# Patient Record
Sex: MALE | Race: BLACK OR AFRICAN AMERICAN | NOT HISPANIC OR LATINO | Employment: OTHER | URBAN - METROPOLITAN AREA
[De-identification: names, ages, dates, MRNs, and addresses within clinical notes are randomized per-mention and may not be internally consistent; named-entity substitution may affect disease eponyms.]

---

## 2023-01-01 ENCOUNTER — APPOINTMENT (OUTPATIENT)
Dept: RADIOLOGY | Facility: HOSPITAL | Age: 63
DRG: 286 | End: 2023-01-01
Payer: MEDICARE

## 2023-01-01 ENCOUNTER — APPOINTMENT (OUTPATIENT)
Dept: PRIMARY CARE | Facility: CLINIC | Age: 63
End: 2023-01-01
Payer: MEDICARE

## 2023-01-01 ENCOUNTER — PATIENT OUTREACH (OUTPATIENT)
Dept: CARE COORDINATION | Facility: CLINIC | Age: 63
End: 2023-01-01

## 2023-01-01 ENCOUNTER — HOSPITAL ENCOUNTER (INPATIENT)
Facility: HOSPITAL | Age: 63
LOS: 18 days | DRG: 286 | End: 2023-10-22
Attending: EMERGENCY MEDICINE | Admitting: INTERNAL MEDICINE
Payer: MEDICARE

## 2023-01-01 ENCOUNTER — TELEPHONE (OUTPATIENT)
Dept: PRIMARY CARE | Facility: CLINIC | Age: 63
End: 2023-01-01

## 2023-01-01 ENCOUNTER — OFFICE VISIT (OUTPATIENT)
Dept: PRIMARY CARE | Facility: CLINIC | Age: 63
End: 2023-01-01
Payer: MEDICARE

## 2023-01-01 ENCOUNTER — DOCUMENTATION (OUTPATIENT)
Dept: CARE COORDINATION | Facility: CLINIC | Age: 63
End: 2023-01-01
Payer: MEDICARE

## 2023-01-01 ENCOUNTER — APPOINTMENT (OUTPATIENT)
Dept: CARDIOLOGY | Facility: HOSPITAL | Age: 63
DRG: 286 | End: 2023-01-01
Payer: MEDICARE

## 2023-01-01 VITALS
RESPIRATION RATE: 20 BRPM | WEIGHT: 315 LBS | HEIGHT: 72 IN | TEMPERATURE: 96.8 F | BODY MASS INDEX: 42.66 KG/M2 | DIASTOLIC BLOOD PRESSURE: 61 MMHG | OXYGEN SATURATION: 95 % | SYSTOLIC BLOOD PRESSURE: 99 MMHG | HEART RATE: 125 BPM

## 2023-01-01 VITALS
DIASTOLIC BLOOD PRESSURE: 69 MMHG | OXYGEN SATURATION: 96 % | WEIGHT: 315 LBS | BODY MASS INDEX: 50.98 KG/M2 | SYSTOLIC BLOOD PRESSURE: 109 MMHG | TEMPERATURE: 96.8 F | HEART RATE: 69 BPM

## 2023-01-01 DIAGNOSIS — K13.70 MOUTH LESION: Primary | ICD-10-CM

## 2023-01-01 DIAGNOSIS — I11.0 HYPERTENSIVE HEART DISEASE WITH HEART FAILURE (MULTI): ICD-10-CM

## 2023-01-01 DIAGNOSIS — I50.31 ACUTE DIASTOLIC HEART FAILURE WITH PRESERVED EJECTION FRACTION (MULTI): Primary | ICD-10-CM

## 2023-01-01 DIAGNOSIS — N17.9 AKI (ACUTE KIDNEY INJURY) (CMS-HCC): Primary | ICD-10-CM

## 2023-01-01 DIAGNOSIS — N17.1: ICD-10-CM

## 2023-01-01 DIAGNOSIS — I10 HYPERTENSION, UNSPECIFIED TYPE: ICD-10-CM

## 2023-01-01 DIAGNOSIS — G89.29 CHRONIC HEEL PAIN, LEFT: ICD-10-CM

## 2023-01-01 DIAGNOSIS — R06.09 OTHER FORMS OF DYSPNEA: ICD-10-CM

## 2023-01-01 DIAGNOSIS — M79.672 CHRONIC HEEL PAIN, LEFT: ICD-10-CM

## 2023-01-01 DIAGNOSIS — I50.33 ACUTE ON CHRONIC DIASTOLIC (CONGESTIVE) HEART FAILURE (MULTI): ICD-10-CM

## 2023-01-01 DIAGNOSIS — I10 ESSENTIAL HYPERTENSION: ICD-10-CM

## 2023-01-01 DIAGNOSIS — E87.70 HYPERVOLEMIA, UNSPECIFIED HYPERVOLEMIA TYPE: ICD-10-CM

## 2023-01-01 DIAGNOSIS — I48.91 ATRIAL FIBRILLATION, UNSPECIFIED TYPE (MULTI): ICD-10-CM

## 2023-01-01 DIAGNOSIS — I50.9 ACUTE ON CHRONIC CONGESTIVE HEART FAILURE, UNSPECIFIED HEART FAILURE TYPE (MULTI): ICD-10-CM

## 2023-01-01 DIAGNOSIS — I50.31 ACUTE DIASTOLIC HEART FAILURE WITH PRESERVED EJECTION FRACTION (MULTI): ICD-10-CM

## 2023-01-01 LAB
ABO GROUP (TYPE) IN BLOOD: NORMAL
ALANINE AMINOTRANSFERASE (SGPT) (U/L) IN SER/PLAS: 10 U/L (ref 10–52)
ALBUMIN (G/DL) IN SER/PLAS: 3.5 G/DL (ref 3.4–5)
ALBUMIN (MG/L) IN URINE: 971.6 MG/L
ALBUMIN MFR UR ELPH: 57 %
ALBUMIN SERPL BCP-MCNC: 2.5 G/DL (ref 3.4–5)
ALBUMIN SERPL BCP-MCNC: 2.6 G/DL (ref 3.4–5)
ALBUMIN SERPL BCP-MCNC: 2.7 G/DL (ref 3.4–5)
ALBUMIN SERPL BCP-MCNC: 2.8 G/DL (ref 3.4–5)
ALBUMIN SERPL BCP-MCNC: 2.9 G/DL (ref 3.4–5)
ALBUMIN SERPL BCP-MCNC: 3.2 G/DL (ref 3.4–5)
ALBUMIN/CREATININE (UG/MG) IN URINE: 528 UG/MG CRT (ref 0–30)
ALBUMIN: 2.2 G/DL (ref 3.4–5)
ALKALINE PHOSPHATASE (U/L) IN SER/PLAS: 88 U/L (ref 33–136)
ALP SERPL-CCNC: 81 U/L (ref 33–136)
ALP SERPL-CCNC: 85 U/L (ref 33–136)
ALPHA 1 GLOBULIN: 0.6 G/DL (ref 0.2–0.6)
ALPHA 2 GLOBULIN: 1.2 G/DL (ref 0.4–1.1)
ALPHA1 GLOB MFR UR ELPH: 3.9 %
ALPHA2 GLOB MFR UR ELPH: 9 %
ALT SERPL W P-5'-P-CCNC: 15 U/L (ref 10–52)
ALT SERPL W P-5'-P-CCNC: 26 U/L (ref 10–52)
ANCA AB PATTERN SER IF-IMP: NORMAL
ANCA IGG TITR SER IF: NORMAL {TITER}
ANION GAP BLDV CALCULATED.4IONS-SCNC: 15 MMOL/L (ref 10–25)
ANION GAP BLDV CALCULATED.4IONS-SCNC: 16 MMOL/L (ref 10–25)
ANION GAP IN SER/PLAS: 15 MMOL/L (ref 10–20)
ANION GAP SERPL CALC-SCNC: 16 MMOL/L (ref 10–20)
ANION GAP SERPL CALC-SCNC: 17 MMOL/L (ref 10–20)
ANION GAP SERPL CALC-SCNC: 17 MMOL/L (ref 10–20)
ANION GAP SERPL CALC-SCNC: 18 MMOL/L (ref 10–20)
ANION GAP SERPL CALC-SCNC: 19 MMOL/L (ref 10–20)
ANION GAP SERPL CALC-SCNC: 19 MMOL/L (ref 10–20)
ANION GAP SERPL CALC-SCNC: 20 MMOL/L (ref 10–20)
ANION GAP SERPL CALC-SCNC: 21 MMOL/L (ref 10–20)
ANION GAP SERPL CALC-SCNC: 21 MMOL/L (ref 10–20)
ANION GAP SERPL CALC-SCNC: 22 MMOL/L (ref 10–20)
ANION GAP SERPL CALC-SCNC: 23 MMOL/L (ref 10–20)
ANION GAP SERPL CALC-SCNC: 24 MMOL/L (ref 10–20)
ANION GAP SERPL CALC-SCNC: 25 MMOL/L (ref 10–20)
ANION GAP SERPL CALC-SCNC: 25 MMOL/L (ref 10–20)
ANION GAP SERPL CALC-SCNC: 27 MMOL/L (ref 10–20)
ANION GAP SERPL CALC-SCNC: 28 MMOL/L (ref 10–20)
ANTIBODY SCREEN: NORMAL
APPEARANCE UR: ABNORMAL
APPEARANCE UR: ABNORMAL
APPEARANCE UR: CLEAR
APTT PPP: 42 SECONDS (ref 27–38)
ASPARTATE AMINOTRANSFERASE (SGOT) (U/L) IN SER/PLAS: 20 U/L (ref 9–39)
AST SERPL W P-5'-P-CCNC: 43 U/L (ref 9–39)
AST SERPL W P-5'-P-CCNC: 85 U/L (ref 9–39)
B-GLOBULIN MFR UR ELPH: 12.1 %
BACTERIA #/AREA URNS AUTO: ABNORMAL /HPF
BASE EXCESS BLDV CALC-SCNC: -2.2 MMOL/L (ref -2–3)
BASE EXCESS BLDV CALC-SCNC: -2.4 MMOL/L (ref -2–3)
BASE EXCESS BLDV CALC-SCNC: 3.2 MMOL/L (ref -2–3)
BASOPHILS # BLD AUTO: 0.04 X10*3/UL (ref 0–0.1)
BASOPHILS # BLD AUTO: 0.05 X10*3/UL (ref 0–0.1)
BASOPHILS # BLD AUTO: 0.06 X10*3/UL (ref 0–0.1)
BASOPHILS # BLD AUTO: 0.07 X10*3/UL (ref 0–0.1)
BASOPHILS # BLD AUTO: 0.08 X10*3/UL (ref 0–0.1)
BASOPHILS (10*3/UL) IN BLOOD BY AUTOMATED COUNT: 0.04 X10E9/L (ref 0–0.1)
BASOPHILS NFR BLD AUTO: 0.4 %
BASOPHILS NFR BLD AUTO: 0.5 %
BASOPHILS NFR BLD AUTO: 0.6 %
BASOPHILS NFR BLD AUTO: 0.7 %
BASOPHILS NFR BLD AUTO: 0.7 %
BASOPHILS/100 LEUKOCYTES IN BLOOD BY AUTOMATED COUNT: 0.5 % (ref 0–2)
BETA GLOBULIN: 0.7 G/DL (ref 0.5–1.2)
BILIRUB SERPL-MCNC: 0.6 MG/DL (ref 0–1.2)
BILIRUB SERPL-MCNC: 0.7 MG/DL (ref 0–1.2)
BILIRUB UR STRIP.AUTO-MCNC: NEGATIVE MG/DL
BILIRUBIN TOTAL (MG/DL) IN SER/PLAS: 0.8 MG/DL (ref 0–1.2)
BNP SERPL-MCNC: 236 PG/ML (ref 0–99)
BODY TEMPERATURE: 37 DEGREES CELSIUS
BUN SERPL-MCNC: 101 MG/DL (ref 6–23)
BUN SERPL-MCNC: 50 MG/DL (ref 6–23)
BUN SERPL-MCNC: 54 MG/DL (ref 6–23)
BUN SERPL-MCNC: 54 MG/DL (ref 6–23)
BUN SERPL-MCNC: 56 MG/DL (ref 6–23)
BUN SERPL-MCNC: 57 MG/DL (ref 6–23)
BUN SERPL-MCNC: 59 MG/DL (ref 6–23)
BUN SERPL-MCNC: 60 MG/DL (ref 6–23)
BUN SERPL-MCNC: 61 MG/DL (ref 6–23)
BUN SERPL-MCNC: 62 MG/DL (ref 6–23)
BUN SERPL-MCNC: 63 MG/DL (ref 6–23)
BUN SERPL-MCNC: 63 MG/DL (ref 6–23)
BUN SERPL-MCNC: 66 MG/DL (ref 6–23)
BUN SERPL-MCNC: 66 MG/DL (ref 6–23)
BUN SERPL-MCNC: 70 MG/DL (ref 6–23)
BUN SERPL-MCNC: 74 MG/DL (ref 6–23)
BUN SERPL-MCNC: 76 MG/DL (ref 6–23)
BUN SERPL-MCNC: 79 MG/DL (ref 6–23)
BUN SERPL-MCNC: 88 MG/DL (ref 6–23)
BUN SERPL-MCNC: 93 MG/DL (ref 6–23)
BUN SERPL-MCNC: 96 MG/DL (ref 6–23)
C3 SERPL-MCNC: 152 MG/DL (ref 87–200)
C4 SERPL-MCNC: 43 MG/DL (ref 10–50)
CA-I BLDV-SCNC: 1.11 MMOL/L (ref 1.1–1.33)
CA-I BLDV-SCNC: 1.14 MMOL/L (ref 1.1–1.33)
CALCIUM (MG/DL) IN SER/PLAS: 8.6 MG/DL (ref 8.6–10.6)
CALCIUM SERPL-MCNC: 8.3 MG/DL (ref 8.6–10.6)
CALCIUM SERPL-MCNC: 8.4 MG/DL (ref 8.6–10.6)
CALCIUM SERPL-MCNC: 8.5 MG/DL (ref 8.6–10.6)
CALCIUM SERPL-MCNC: 8.6 MG/DL (ref 8.6–10.6)
CALCIUM SERPL-MCNC: 8.6 MG/DL (ref 8.6–10.6)
CALCIUM SERPL-MCNC: 8.7 MG/DL (ref 8.6–10.6)
CALCIUM SERPL-MCNC: 8.7 MG/DL (ref 8.6–10.6)
CALCIUM SERPL-MCNC: 8.8 MG/DL (ref 8.6–10.6)
CALCIUM SERPL-MCNC: 8.9 MG/DL (ref 8.6–10.6)
CALCIUM SERPL-MCNC: 9 MG/DL (ref 8.6–10.6)
CALCIUM SERPL-MCNC: 9 MG/DL (ref 8.6–10.6)
CALCIUM SERPL-MCNC: 9.1 MG/DL (ref 8.6–10.6)
CALCIUM SERPL-MCNC: 9.2 MG/DL (ref 8.6–10.6)
CARBON DIOXIDE, TOTAL (MMOL/L) IN SER/PLAS: 26 MMOL/L (ref 21–32)
CARDIAC TROPONIN I PNL SERPL HS: 33 NG/L (ref 0–53)
CHLORIDE (MMOL/L) IN SER/PLAS: 107 MMOL/L (ref 98–107)
CHLORIDE BLDV-SCNC: 97 MMOL/L (ref 98–107)
CHLORIDE BLDV-SCNC: 97 MMOL/L (ref 98–107)
CHLORIDE SERPL-SCNC: 100 MMOL/L (ref 98–107)
CHLORIDE SERPL-SCNC: 100 MMOL/L (ref 98–107)
CHLORIDE SERPL-SCNC: 101 MMOL/L (ref 98–107)
CHLORIDE SERPL-SCNC: 102 MMOL/L (ref 98–107)
CHLORIDE SERPL-SCNC: 104 MMOL/L (ref 98–107)
CHLORIDE SERPL-SCNC: 94 MMOL/L (ref 98–107)
CHLORIDE SERPL-SCNC: 95 MMOL/L (ref 98–107)
CHLORIDE SERPL-SCNC: 96 MMOL/L (ref 98–107)
CHLORIDE SERPL-SCNC: 96 MMOL/L (ref 98–107)
CHLORIDE SERPL-SCNC: 97 MMOL/L (ref 98–107)
CHLORIDE SERPL-SCNC: 98 MMOL/L (ref 98–107)
CHLORIDE SERPL-SCNC: 98 MMOL/L (ref 98–107)
CHLORIDE SERPL-SCNC: 99 MMOL/L (ref 98–107)
CHLORIDE SERPL-SCNC: 99 MMOL/L (ref 98–107)
CHLORIDE UR-SCNC: 17 MMOL/L
CHLORIDE UR-SCNC: 35 MMOL/L
CHLORIDE UR-SCNC: <15 MMOL/L
CHLORIDE/CREATININE (MMOL/G) IN URINE: 20 MMOL/G CREAT (ref 23–275)
CHLORIDE/CREATININE (MMOL/G) IN URINE: 4 MMOL/G CREAT (ref 23–275)
CHLORIDE/CREATININE (MMOL/G) IN URINE: ABNORMAL
CHOLESTEROL (MG/DL) IN SER/PLAS: 155 MG/DL (ref 0–199)
CHOLESTEROL IN HDL (MG/DL) IN SER/PLAS: 30.5 MG/DL
CHOLESTEROL/HDL RATIO: 5.1
CO2 SERPL-SCNC: 20 MMOL/L (ref 21–32)
CO2 SERPL-SCNC: 22 MMOL/L (ref 21–32)
CO2 SERPL-SCNC: 23 MMOL/L (ref 21–32)
CO2 SERPL-SCNC: 24 MMOL/L (ref 21–32)
CO2 SERPL-SCNC: 24 MMOL/L (ref 21–32)
CO2 SERPL-SCNC: 25 MMOL/L (ref 21–32)
CO2 SERPL-SCNC: 25 MMOL/L (ref 21–32)
CO2 SERPL-SCNC: 26 MMOL/L (ref 21–32)
CO2 SERPL-SCNC: 27 MMOL/L (ref 21–32)
CO2 SERPL-SCNC: 28 MMOL/L (ref 21–32)
CO2 SERPL-SCNC: 29 MMOL/L (ref 21–32)
CO2 SERPL-SCNC: 30 MMOL/L (ref 21–32)
CO2 SERPL-SCNC: 30 MMOL/L (ref 21–32)
CO2 SERPL-SCNC: 31 MMOL/L (ref 21–32)
COLOR UR: ABNORMAL
CREAT SERPL-MCNC: 11.21 MG/DL (ref 0.5–1.3)
CREAT SERPL-MCNC: 12.38 MG/DL (ref 0.5–1.3)
CREAT SERPL-MCNC: 2.88 MG/DL (ref 0.5–1.3)
CREAT SERPL-MCNC: 3.1 MG/DL (ref 0.5–1.3)
CREAT SERPL-MCNC: 3.1 MG/DL (ref 0.5–1.3)
CREAT SERPL-MCNC: 3.13 MG/DL (ref 0.5–1.3)
CREAT SERPL-MCNC: 3.13 MG/DL (ref 0.5–1.3)
CREAT SERPL-MCNC: 3.14 MG/DL (ref 0.5–1.3)
CREAT SERPL-MCNC: 3.17 MG/DL (ref 0.5–1.3)
CREAT SERPL-MCNC: 3.17 MG/DL (ref 0.5–1.3)
CREAT SERPL-MCNC: 3.29 MG/DL (ref 0.5–1.3)
CREAT SERPL-MCNC: 3.34 MG/DL (ref 0.5–1.3)
CREAT SERPL-MCNC: 3.42 MG/DL (ref 0.5–1.3)
CREAT SERPL-MCNC: 3.43 MG/DL (ref 0.5–1.3)
CREAT SERPL-MCNC: 3.6 MG/DL (ref 0.5–1.3)
CREAT SERPL-MCNC: 3.68 MG/DL (ref 0.5–1.3)
CREAT SERPL-MCNC: 3.69 MG/DL (ref 0.5–1.3)
CREAT SERPL-MCNC: 3.74 MG/DL (ref 0.5–1.3)
CREAT SERPL-MCNC: 3.91 MG/DL (ref 0.5–1.3)
CREAT SERPL-MCNC: 4.37 MG/DL (ref 0.5–1.3)
CREAT SERPL-MCNC: 5.46 MG/DL (ref 0.5–1.3)
CREAT SERPL-MCNC: 6.3 MG/DL (ref 0.5–1.3)
CREAT SERPL-MCNC: 7 MG/DL (ref 0.5–1.3)
CREAT SERPL-MCNC: 7.88 MG/DL (ref 0.5–1.3)
CREAT SERPL-MCNC: 9.5 MG/DL (ref 0.5–1.3)
CREAT SERPL-MCNC: 9.88 MG/DL (ref 0.5–1.3)
CREAT UR-MCNC: 172.8 MG/DL (ref 20–370)
CREAT UR-MCNC: 427.6 MG/DL (ref 20–370)
CREAT UR-MCNC: 427.6 MG/DL (ref 20–370)
CREAT UR-MCNC: 429.4 MG/DL (ref 20–370)
CREAT UR-MCNC: 429.4 MG/DL (ref 20–370)
CREATININE (MG/DL) IN SER/PLAS: 1.27 MG/DL (ref 0.5–1.3)
CREATININE (MG/DL) IN URINE: 184 MG/DL (ref 20–370)
EJECTION FRACTION APICAL 4 CHAMBER: 66
EOSINOPHIL # BLD AUTO: 0.16 X10*3/UL (ref 0–0.7)
EOSINOPHIL # BLD AUTO: 0.19 X10*3/UL (ref 0–0.7)
EOSINOPHIL # BLD AUTO: 0.22 X10*3/UL (ref 0–0.7)
EOSINOPHIL # BLD AUTO: 0.26 X10*3/UL (ref 0–0.7)
EOSINOPHIL # BLD AUTO: 0.39 X10*3/UL (ref 0–0.7)
EOSINOPHIL # BLD AUTO: 0.43 X10*3/UL (ref 0–0.7)
EOSINOPHIL # BLD AUTO: 0.44 X10*3/UL (ref 0–0.7)
EOSINOPHIL # BLD AUTO: 0.48 X10*3/UL (ref 0–0.7)
EOSINOPHIL # BLD AUTO: 0.5 X10*3/UL (ref 0–0.7)
EOSINOPHIL # BLD AUTO: 0.5 X10*3/UL (ref 0–0.7)
EOSINOPHIL # BLD AUTO: 0.52 X10*3/UL (ref 0–0.7)
EOSINOPHIL # BLD AUTO: 0.52 X10*3/UL (ref 0–0.7)
EOSINOPHIL # BLD AUTO: 0.59 X10*3/UL (ref 0–0.7)
EOSINOPHIL NFR BLD AUTO: 1.5 %
EOSINOPHIL NFR BLD AUTO: 1.7 %
EOSINOPHIL NFR BLD AUTO: 1.9 %
EOSINOPHIL NFR BLD AUTO: 2.2 %
EOSINOPHIL NFR BLD AUTO: 3.5 %
EOSINOPHIL NFR BLD AUTO: 3.9 %
EOSINOPHIL NFR BLD AUTO: 4.1 %
EOSINOPHIL NFR BLD AUTO: 4.3 %
EOSINOPHIL NFR BLD AUTO: 4.5 %
EOSINOPHIL NFR BLD AUTO: 4.5 %
EOSINOPHIL NFR BLD AUTO: 4.6 %
EOSINOPHIL NFR BLD AUTO: 4.8 %
EOSINOPHIL NFR BLD AUTO: 5.6 %
EOSINOPHILS (10*3/UL) IN BLOOD BY AUTOMATED COUNT: 0.29 X10E9/L (ref 0–0.7)
EOSINOPHILS/100 LEUKOCYTES IN BLOOD BY AUTOMATED COUNT: 3.5 % (ref 0–6)
ERYTHROCYTE DISTRIBUTION WIDTH (RATIO) BY AUTOMATED COUNT: 16.3 % (ref 11.5–14.5)
ERYTHROCYTE MEAN CORPUSCULAR HEMOGLOBIN CONCENTRATION (G/DL) BY AUTOMATED: 31 G/DL (ref 32–36)
ERYTHROCYTE MEAN CORPUSCULAR VOLUME (FL) BY AUTOMATED COUNT: 75 FL (ref 80–100)
ERYTHROCYTE [DISTWIDTH] IN BLOOD BY AUTOMATED COUNT: 16.3 % (ref 11.5–14.5)
ERYTHROCYTE [DISTWIDTH] IN BLOOD BY AUTOMATED COUNT: 16.4 % (ref 11.5–14.5)
ERYTHROCYTE [DISTWIDTH] IN BLOOD BY AUTOMATED COUNT: 16.8 % (ref 11.5–14.5)
ERYTHROCYTE [DISTWIDTH] IN BLOOD BY AUTOMATED COUNT: 16.9 % (ref 11.5–14.5)
ERYTHROCYTE [DISTWIDTH] IN BLOOD BY AUTOMATED COUNT: 16.9 % (ref 11.5–14.5)
ERYTHROCYTE [DISTWIDTH] IN BLOOD BY AUTOMATED COUNT: 17.2 % (ref 11.5–14.5)
ERYTHROCYTE [DISTWIDTH] IN BLOOD BY AUTOMATED COUNT: 17.3 % (ref 11.5–14.5)
ERYTHROCYTE [DISTWIDTH] IN BLOOD BY AUTOMATED COUNT: 17.3 % (ref 11.5–14.5)
ERYTHROCYTE [DISTWIDTH] IN BLOOD BY AUTOMATED COUNT: 17.5 % (ref 11.5–14.5)
ERYTHROCYTE [DISTWIDTH] IN BLOOD BY AUTOMATED COUNT: 17.6 % (ref 11.5–14.5)
ERYTHROCYTE [DISTWIDTH] IN BLOOD BY AUTOMATED COUNT: 17.8 % (ref 11.5–14.5)
ERYTHROCYTE [DISTWIDTH] IN BLOOD BY AUTOMATED COUNT: 17.9 % (ref 11.5–14.5)
ERYTHROCYTE [DISTWIDTH] IN BLOOD BY AUTOMATED COUNT: 18 % (ref 11.5–14.5)
ERYTHROCYTE [DISTWIDTH] IN BLOOD BY AUTOMATED COUNT: 18 % (ref 11.5–14.5)
ERYTHROCYTE [DISTWIDTH] IN BLOOD BY AUTOMATED COUNT: 18.1 % (ref 11.5–14.5)
ERYTHROCYTES (10*6/UL) IN BLOOD BY AUTOMATED COUNT: 5.62 X10E12/L (ref 4.5–5.9)
EST. AVERAGE GLUCOSE BLD GHB EST-MCNC: 114 MG/DL
ESTIMATED AVERAGE GLUCOSE FOR HBA1C: 111 MG/DL
GAMMA GLOB MFR UR ELPH: 18 %
GAMMA GLOBULIN: 0.9 G/DL (ref 0.5–1.4)
GFR MALE: 65 ML/MIN/1.73M2
GFR SERPL CREATININE-BSD FRML MDRD: 11 ML/MIN/1.73M*2
GFR SERPL CREATININE-BSD FRML MDRD: 15 ML/MIN/1.73M*2
GFR SERPL CREATININE-BSD FRML MDRD: 17 ML/MIN/1.73M*2
GFR SERPL CREATININE-BSD FRML MDRD: 18 ML/MIN/1.73M*2
GFR SERPL CREATININE-BSD FRML MDRD: 20 ML/MIN/1.73M*2
GFR SERPL CREATININE-BSD FRML MDRD: 21 ML/MIN/1.73M*2
GFR SERPL CREATININE-BSD FRML MDRD: 21 ML/MIN/1.73M*2
GFR SERPL CREATININE-BSD FRML MDRD: 22 ML/MIN/1.73M*2
GFR SERPL CREATININE-BSD FRML MDRD: 24 ML/MIN/1.73M*2
GFR SERPL CREATININE-BSD FRML MDRD: 4 ML/MIN/1.73M*2
GFR SERPL CREATININE-BSD FRML MDRD: 5 ML/MIN/1.73M*2
GFR SERPL CREATININE-BSD FRML MDRD: 5 ML/MIN/1.73M*2
GFR SERPL CREATININE-BSD FRML MDRD: 6 ML/MIN/1.73M*2
GFR SERPL CREATININE-BSD FRML MDRD: 7 ML/MIN/1.73M*2
GFR SERPL CREATININE-BSD FRML MDRD: 8 ML/MIN/1.73M*2
GFR SERPL CREATININE-BSD FRML MDRD: 9 ML/MIN/1.73M*2
GLUCOSE (MG/DL) IN SER/PLAS: 90 MG/DL (ref 74–99)
GLUCOSE BLD MANUAL STRIP-MCNC: 108 MG/DL (ref 74–99)
GLUCOSE BLD MANUAL STRIP-MCNC: 83 MG/DL (ref 74–99)
GLUCOSE BLD MANUAL STRIP-MCNC: 83 MG/DL (ref 74–99)
GLUCOSE BLD MANUAL STRIP-MCNC: 88 MG/DL (ref 74–99)
GLUCOSE BLD MANUAL STRIP-MCNC: 96 MG/DL (ref 74–99)
GLUCOSE BLD MANUAL STRIP-MCNC: 97 MG/DL (ref 74–99)
GLUCOSE BLDV-MCNC: 87 MG/DL (ref 74–99)
GLUCOSE BLDV-MCNC: 97 MG/DL (ref 74–99)
GLUCOSE SERPL-MCNC: 100 MG/DL (ref 74–99)
GLUCOSE SERPL-MCNC: 101 MG/DL (ref 74–99)
GLUCOSE SERPL-MCNC: 61 MG/DL (ref 74–99)
GLUCOSE SERPL-MCNC: 67 MG/DL (ref 74–99)
GLUCOSE SERPL-MCNC: 68 MG/DL (ref 74–99)
GLUCOSE SERPL-MCNC: 68 MG/DL (ref 74–99)
GLUCOSE SERPL-MCNC: 69 MG/DL (ref 74–99)
GLUCOSE SERPL-MCNC: 70 MG/DL (ref 74–99)
GLUCOSE SERPL-MCNC: 71 MG/DL (ref 74–99)
GLUCOSE SERPL-MCNC: 79 MG/DL (ref 74–99)
GLUCOSE SERPL-MCNC: 80 MG/DL (ref 74–99)
GLUCOSE SERPL-MCNC: 80 MG/DL (ref 74–99)
GLUCOSE SERPL-MCNC: 82 MG/DL (ref 74–99)
GLUCOSE SERPL-MCNC: 83 MG/DL (ref 74–99)
GLUCOSE SERPL-MCNC: 83 MG/DL (ref 74–99)
GLUCOSE SERPL-MCNC: 84 MG/DL (ref 74–99)
GLUCOSE SERPL-MCNC: 86 MG/DL (ref 74–99)
GLUCOSE SERPL-MCNC: 88 MG/DL (ref 74–99)
GLUCOSE SERPL-MCNC: 89 MG/DL (ref 74–99)
GLUCOSE SERPL-MCNC: 89 MG/DL (ref 74–99)
GLUCOSE SERPL-MCNC: 90 MG/DL (ref 74–99)
GLUCOSE SERPL-MCNC: 94 MG/DL (ref 74–99)
GLUCOSE SERPL-MCNC: 96 MG/DL (ref 74–99)
GLUCOSE SERPL-MCNC: 98 MG/DL (ref 74–99)
GLUCOSE UR STRIP.AUTO-MCNC: NEGATIVE MG/DL
HBA1C MFR BLD: 5.6 %
HCO3 BLDV-SCNC: 22.4 MMOL/L (ref 22–26)
HCO3 BLDV-SCNC: 23.2 MMOL/L (ref 22–26)
HCO3 BLDV-SCNC: 28.5 MMOL/L (ref 22–26)
HCT VFR BLD AUTO: 35.2 % (ref 41–52)
HCT VFR BLD AUTO: 35.4 % (ref 41–52)
HCT VFR BLD AUTO: 35.8 % (ref 41–52)
HCT VFR BLD AUTO: 36.2 % (ref 41–52)
HCT VFR BLD AUTO: 36.4 % (ref 41–52)
HCT VFR BLD AUTO: 36.4 % (ref 41–52)
HCT VFR BLD AUTO: 36.9 % (ref 41–52)
HCT VFR BLD AUTO: 37.6 % (ref 41–52)
HCT VFR BLD AUTO: 37.7 % (ref 41–52)
HCT VFR BLD AUTO: 37.7 % (ref 41–52)
HCT VFR BLD AUTO: 38 % (ref 41–52)
HCT VFR BLD AUTO: 38.4 % (ref 41–52)
HCT VFR BLD AUTO: 38.8 % (ref 41–52)
HCT VFR BLD AUTO: 39 % (ref 41–52)
HCT VFR BLD AUTO: 39 % (ref 41–52)
HCT VFR BLD EST: 38 % (ref 41–52)
HCT VFR BLD EST: 38 % (ref 41–52)
HEMATOCRIT (%) IN BLOOD BY AUTOMATED COUNT: 42 % (ref 41–52)
HEMOGLOBIN (G/DL) IN BLOOD: 13 G/DL (ref 13.5–17.5)
HEMOGLOBIN A1C/HEMOGLOBIN TOTAL IN BLOOD: 5.5 %
HGB BLD-MCNC: 11 G/DL (ref 13.5–17.5)
HGB BLD-MCNC: 11.2 G/DL (ref 13.5–17.5)
HGB BLD-MCNC: 11.5 G/DL (ref 13.5–17.5)
HGB BLD-MCNC: 11.6 G/DL (ref 13.5–17.5)
HGB BLD-MCNC: 11.7 G/DL (ref 13.5–17.5)
HGB BLD-MCNC: 11.8 G/DL (ref 13.5–17.5)
HGB BLD-MCNC: 11.8 G/DL (ref 13.5–17.5)
HGB BLD-MCNC: 11.9 G/DL (ref 13.5–17.5)
HGB BLD-MCNC: 12 G/DL (ref 13.5–17.5)
HGB BLD-MCNC: 12.1 G/DL (ref 13.5–17.5)
HGB BLD-MCNC: 12.2 G/DL (ref 13.5–17.5)
HGB BLD-MCNC: 12.3 G/DL (ref 13.5–17.5)
HGB BLD-MCNC: 12.5 G/DL (ref 13.5–17.5)
HGB BLDV-MCNC: 12.6 G/DL (ref 13.5–17.5)
HGB BLDV-MCNC: 12.7 G/DL (ref 13.5–17.5)
HYALINE CASTS #/AREA URNS AUTO: ABNORMAL /LPF
HYALINE CASTS #/AREA URNS AUTO: ABNORMAL /LPF
IMM GRANULOCYTES # BLD AUTO: 0.04 X10*3/UL (ref 0–0.7)
IMM GRANULOCYTES # BLD AUTO: 0.05 X10*3/UL (ref 0–0.7)
IMM GRANULOCYTES # BLD AUTO: 0.06 X10*3/UL (ref 0–0.7)
IMM GRANULOCYTES # BLD AUTO: 0.07 X10*3/UL (ref 0–0.7)
IMM GRANULOCYTES # BLD AUTO: 0.08 X10*3/UL (ref 0–0.7)
IMM GRANULOCYTES # BLD AUTO: 0.09 X10*3/UL (ref 0–0.7)
IMM GRANULOCYTES # BLD AUTO: 0.09 X10*3/UL (ref 0–0.7)
IMM GRANULOCYTES # BLD AUTO: 0.1 X10*3/UL (ref 0–0.7)
IMM GRANULOCYTES # BLD AUTO: 0.1 X10*3/UL (ref 0–0.7)
IMM GRANULOCYTES # BLD AUTO: 0.12 X10*3/UL (ref 0–0.7)
IMM GRANULOCYTES # BLD AUTO: 0.14 X10*3/UL (ref 0–0.7)
IMM GRANULOCYTES NFR BLD AUTO: 0.4 % (ref 0–0.9)
IMM GRANULOCYTES NFR BLD AUTO: 0.5 % (ref 0–0.9)
IMM GRANULOCYTES NFR BLD AUTO: 0.6 % (ref 0–0.9)
IMM GRANULOCYTES NFR BLD AUTO: 0.7 % (ref 0–0.9)
IMM GRANULOCYTES NFR BLD AUTO: 0.7 % (ref 0–0.9)
IMM GRANULOCYTES NFR BLD AUTO: 0.8 % (ref 0–0.9)
IMM GRANULOCYTES NFR BLD AUTO: 0.9 % (ref 0–0.9)
IMM GRANULOCYTES NFR BLD AUTO: 1 % (ref 0–0.9)
IMM GRANULOCYTES NFR BLD AUTO: 1.3 % (ref 0–0.9)
IMMATURE GRANULOCYTES/100 LEUKOCYTES IN BLOOD BY AUTOMATED COUNT: 0.4 % (ref 0–0.9)
IMMUNOFIXATION COMMENT: ABNORMAL
INHALED O2 CONCENTRATION: 100 %
INHALED O2 CONCENTRATION: 32 %
INHALED O2 CONCENTRATION: 95 %
INR PPP: 1.1 (ref 0.9–1.1)
KETONES UR STRIP.AUTO-MCNC: ABNORMAL MG/DL
KETONES UR STRIP.AUTO-MCNC: NEGATIVE MG/DL
KETONES UR STRIP.AUTO-MCNC: NEGATIVE MG/DL
LACTATE BLDV-SCNC: 4.6 MMOL/L (ref 0.4–2)
LACTATE BLDV-SCNC: 5.6 MMOL/L (ref 0.4–2)
LACTATE SERPL-SCNC: 3.2 MMOL/L (ref 0.4–2)
LACTATE SERPL-SCNC: 4.1 MMOL/L (ref 0.4–2)
LACTATE SERPL-SCNC: 5 MMOL/L (ref 0.4–2)
LDL: 108 MG/DL (ref 0–99)
LEUKOCYTE ESTERASE UR QL STRIP.AUTO: ABNORMAL
LEUKOCYTES (10*3/UL) IN BLOOD BY AUTOMATED COUNT: 8.3 X10E9/L (ref 4.4–11.3)
LYMPHOCYTES # BLD AUTO: 0.6 X10*3/UL (ref 1.2–4.8)
LYMPHOCYTES # BLD AUTO: 0.62 X10*3/UL (ref 1.2–4.8)
LYMPHOCYTES # BLD AUTO: 0.63 X10*3/UL (ref 1.2–4.8)
LYMPHOCYTES # BLD AUTO: 0.64 X10*3/UL (ref 1.2–4.8)
LYMPHOCYTES # BLD AUTO: 0.64 X10*3/UL (ref 1.2–4.8)
LYMPHOCYTES # BLD AUTO: 0.65 X10*3/UL (ref 1.2–4.8)
LYMPHOCYTES # BLD AUTO: 0.66 X10*3/UL (ref 1.2–4.8)
LYMPHOCYTES # BLD AUTO: 0.68 X10*3/UL (ref 1.2–4.8)
LYMPHOCYTES # BLD AUTO: 0.73 X10*3/UL (ref 1.2–4.8)
LYMPHOCYTES # BLD AUTO: 0.76 X10*3/UL (ref 1.2–4.8)
LYMPHOCYTES # BLD AUTO: 0.78 X10*3/UL (ref 1.2–4.8)
LYMPHOCYTES # BLD AUTO: 0.8 X10*3/UL (ref 1.2–4.8)
LYMPHOCYTES # BLD AUTO: 0.87 X10*3/UL (ref 1.2–4.8)
LYMPHOCYTES (10*3/UL) IN BLOOD BY AUTOMATED COUNT: 2.25 X10E9/L (ref 1.2–4.8)
LYMPHOCYTES NFR BLD AUTO: 5.2 %
LYMPHOCYTES NFR BLD AUTO: 5.4 %
LYMPHOCYTES NFR BLD AUTO: 5.7 %
LYMPHOCYTES NFR BLD AUTO: 5.7 %
LYMPHOCYTES NFR BLD AUTO: 5.9 %
LYMPHOCYTES NFR BLD AUTO: 6 %
LYMPHOCYTES NFR BLD AUTO: 6.2 %
LYMPHOCYTES NFR BLD AUTO: 6.5 %
LYMPHOCYTES NFR BLD AUTO: 6.7 %
LYMPHOCYTES NFR BLD AUTO: 6.9 %
LYMPHOCYTES NFR BLD AUTO: 7 %
LYMPHOCYTES NFR BLD AUTO: 7.3 %
LYMPHOCYTES NFR BLD AUTO: 7.5 %
LYMPHOCYTES/100 LEUKOCYTES IN BLOOD BY AUTOMATED COUNT: 27.1 % (ref 13–44)
M-PROTEIN 1: 0.1 G/DL
MAGNESIUM (MG/DL) IN SER/PLAS: 1.91 MG/DL (ref 1.6–2.4)
MAGNESIUM SERPL-MCNC: 1.84 MG/DL (ref 1.6–2.4)
MAGNESIUM SERPL-MCNC: 1.85 MG/DL (ref 1.6–2.4)
MAGNESIUM SERPL-MCNC: 1.87 MG/DL (ref 1.6–2.4)
MAGNESIUM SERPL-MCNC: 1.9 MG/DL (ref 1.6–2.4)
MAGNESIUM SERPL-MCNC: 1.91 MG/DL (ref 1.6–2.4)
MAGNESIUM SERPL-MCNC: 1.94 MG/DL (ref 1.6–2.4)
MAGNESIUM SERPL-MCNC: 1.94 MG/DL (ref 1.6–2.4)
MAGNESIUM SERPL-MCNC: 1.97 MG/DL (ref 1.6–2.4)
MAGNESIUM SERPL-MCNC: 1.98 MG/DL (ref 1.6–2.4)
MAGNESIUM SERPL-MCNC: 1.98 MG/DL (ref 1.6–2.4)
MAGNESIUM SERPL-MCNC: 2.03 MG/DL (ref 1.6–2.4)
MAGNESIUM SERPL-MCNC: 2.05 MG/DL (ref 1.6–2.4)
MAGNESIUM SERPL-MCNC: 2.07 MG/DL (ref 1.6–2.4)
MAGNESIUM SERPL-MCNC: 2.1 MG/DL (ref 1.6–2.4)
MAGNESIUM SERPL-MCNC: 2.13 MG/DL (ref 1.6–2.4)
MAGNESIUM SERPL-MCNC: 2.23 MG/DL (ref 1.6–2.4)
MAGNESIUM SERPL-MCNC: 2.29 MG/DL (ref 1.6–2.4)
MAGNESIUM SERPL-MCNC: 2.31 MG/DL (ref 1.6–2.4)
MAGNESIUM SERPL-MCNC: 2.35 MG/DL (ref 1.6–2.4)
MAGNESIUM SERPL-MCNC: 2.38 MG/DL (ref 1.6–2.4)
MAGNESIUM SERPL-MCNC: 2.39 MG/DL (ref 1.6–2.4)
MAGNESIUM SERPL-MCNC: 2.46 MG/DL (ref 1.6–2.4)
MAGNESIUM SERPL-MCNC: 2.66 MG/DL (ref 1.6–2.4)
MAGNESIUM SERPL-MCNC: 2.72 MG/DL (ref 1.6–2.4)
MAGNESIUM SERPL-MCNC: 2.73 MG/DL (ref 1.6–2.4)
MCH RBC QN AUTO: 22.9 PG (ref 26–34)
MCH RBC QN AUTO: 23 PG (ref 26–34)
MCH RBC QN AUTO: 23.1 PG (ref 26–34)
MCH RBC QN AUTO: 23.4 PG (ref 26–34)
MCH RBC QN AUTO: 23.6 PG (ref 26–34)
MCH RBC QN AUTO: 23.6 PG (ref 26–34)
MCH RBC QN AUTO: 23.7 PG (ref 26–34)
MCH RBC QN AUTO: 23.7 PG (ref 26–34)
MCHC RBC AUTO-ENTMCNC: 31 G/DL (ref 32–36)
MCHC RBC AUTO-ENTMCNC: 31.3 G/DL (ref 32–36)
MCHC RBC AUTO-ENTMCNC: 31.3 G/DL (ref 32–36)
MCHC RBC AUTO-ENTMCNC: 31.4 G/DL (ref 32–36)
MCHC RBC AUTO-ENTMCNC: 31.4 G/DL (ref 32–36)
MCHC RBC AUTO-ENTMCNC: 31.6 G/DL (ref 32–36)
MCHC RBC AUTO-ENTMCNC: 31.7 G/DL (ref 32–36)
MCHC RBC AUTO-ENTMCNC: 31.8 G/DL (ref 32–36)
MCHC RBC AUTO-ENTMCNC: 31.8 G/DL (ref 32–36)
MCHC RBC AUTO-ENTMCNC: 31.9 G/DL (ref 32–36)
MCHC RBC AUTO-ENTMCNC: 32 G/DL (ref 32–36)
MCHC RBC AUTO-ENTMCNC: 32.1 G/DL (ref 32–36)
MCHC RBC AUTO-ENTMCNC: 32.1 G/DL (ref 32–36)
MCHC RBC AUTO-ENTMCNC: 33 G/DL (ref 32–36)
MCHC RBC AUTO-ENTMCNC: 33.2 G/DL (ref 32–36)
MCV RBC AUTO: 69 FL (ref 80–100)
MCV RBC AUTO: 70 FL (ref 80–100)
MCV RBC AUTO: 72 FL (ref 80–100)
MCV RBC AUTO: 72 FL (ref 80–100)
MCV RBC AUTO: 73 FL (ref 80–100)
MCV RBC AUTO: 74 FL (ref 80–100)
MCV RBC AUTO: 75 FL (ref 80–100)
MCV RBC AUTO: 76 FL (ref 80–100)
MONOCYTES # BLD AUTO: 0.7 X10*3/UL (ref 0.1–1)
MONOCYTES # BLD AUTO: 0.73 X10*3/UL (ref 0.1–1)
MONOCYTES # BLD AUTO: 0.79 X10*3/UL (ref 0.1–1)
MONOCYTES # BLD AUTO: 0.81 X10*3/UL (ref 0.1–1)
MONOCYTES # BLD AUTO: 0.83 X10*3/UL (ref 0.1–1)
MONOCYTES # BLD AUTO: 0.85 X10*3/UL (ref 0.1–1)
MONOCYTES # BLD AUTO: 0.9 X10*3/UL (ref 0.1–1)
MONOCYTES # BLD AUTO: 0.93 X10*3/UL (ref 0.1–1)
MONOCYTES # BLD AUTO: 0.93 X10*3/UL (ref 0.1–1)
MONOCYTES # BLD AUTO: 0.95 X10*3/UL (ref 0.1–1)
MONOCYTES # BLD AUTO: 0.96 X10*3/UL (ref 0.1–1)
MONOCYTES # BLD AUTO: 0.98 X10*3/UL (ref 0.1–1)
MONOCYTES # BLD AUTO: 1 X10*3/UL (ref 0.1–1)
MONOCYTES (10*3/UL) IN BLOOD BY AUTOMATED COUNT: 0.68 X10E9/L (ref 0.1–1)
MONOCYTES NFR BLD AUTO: 6.6 %
MONOCYTES NFR BLD AUTO: 6.6 %
MONOCYTES NFR BLD AUTO: 7 %
MONOCYTES NFR BLD AUTO: 7.1 %
MONOCYTES NFR BLD AUTO: 7.3 %
MONOCYTES NFR BLD AUTO: 7.7 %
MONOCYTES NFR BLD AUTO: 7.7 %
MONOCYTES NFR BLD AUTO: 8.4 %
MONOCYTES NFR BLD AUTO: 8.5 %
MONOCYTES NFR BLD AUTO: 8.6 %
MONOCYTES NFR BLD AUTO: 8.9 %
MONOCYTES NFR BLD AUTO: 8.9 %
MONOCYTES NFR BLD AUTO: 9.2 %
MONOCYTES/100 LEUKOCYTES IN BLOOD BY AUTOMATED COUNT: 8.2 % (ref 2–10)
MUCOUS THREADS #/AREA URNS AUTO: ABNORMAL /LPF
MUCOUS THREADS #/AREA URNS AUTO: ABNORMAL /LPF
MYELOPEROXIDASE AB SER-ACNC: 0 AU/ML (ref 0–19)
NATRIURETIC PEPTIDE B (PG/ML) IN SER/PLAS: 561 PG/ML (ref 0–99)
NEUTROPHILS # BLD AUTO: 10 X10*3/UL (ref 1.2–7.7)
NEUTROPHILS # BLD AUTO: 8.11 X10*3/UL (ref 1.2–7.7)
NEUTROPHILS # BLD AUTO: 8.16 X10*3/UL (ref 1.2–7.7)
NEUTROPHILS # BLD AUTO: 8.36 X10*3/UL (ref 1.2–7.7)
NEUTROPHILS # BLD AUTO: 8.53 X10*3/UL (ref 1.2–7.7)
NEUTROPHILS # BLD AUTO: 8.8 X10*3/UL (ref 1.2–7.7)
NEUTROPHILS # BLD AUTO: 8.87 X10*3/UL (ref 1.2–7.7)
NEUTROPHILS # BLD AUTO: 9.11 X10*3/UL (ref 1.2–7.7)
NEUTROPHILS # BLD AUTO: 9.15 X10*3/UL (ref 1.2–7.7)
NEUTROPHILS # BLD AUTO: 9.24 X10*3/UL (ref 1.2–7.7)
NEUTROPHILS # BLD AUTO: 9.25 X10*3/UL (ref 1.2–7.7)
NEUTROPHILS # BLD AUTO: 9.44 X10*3/UL (ref 1.2–7.7)
NEUTROPHILS # BLD AUTO: 9.56 X10*3/UL (ref 1.2–7.7)
NEUTROPHILS (10*3/UL) IN BLOOD BY AUTOMATED COUNT: 5 X10E9/L (ref 1.2–7.7)
NEUTROPHILS NFR BLD AUTO: 77.4 %
NEUTROPHILS NFR BLD AUTO: 78 %
NEUTROPHILS NFR BLD AUTO: 78.5 %
NEUTROPHILS NFR BLD AUTO: 79.1 %
NEUTROPHILS NFR BLD AUTO: 79.5 %
NEUTROPHILS NFR BLD AUTO: 79.8 %
NEUTROPHILS NFR BLD AUTO: 80 %
NEUTROPHILS NFR BLD AUTO: 80.6 %
NEUTROPHILS NFR BLD AUTO: 82.3 %
NEUTROPHILS NFR BLD AUTO: 82.4 %
NEUTROPHILS NFR BLD AUTO: 84 %
NEUTROPHILS NFR BLD AUTO: 84.6 %
NEUTROPHILS NFR BLD AUTO: 85.2 %
NEUTROPHILS/100 LEUKOCYTES IN BLOOD BY AUTOMATED COUNT: 60.3 % (ref 40–80)
NITRITE UR QL STRIP.AUTO: NEGATIVE
NRBC (PER 100 WBCS) BY AUTOMATED COUNT: 0 /100 WBC (ref 0–0)
NRBC BLD-RTO: 0 /100 WBCS (ref 0–0)
NRBC BLD-RTO: 0.2 /100 WBCS (ref 0–0)
NRBC BLD-RTO: 0.3 /100 WBCS (ref 0–0)
NRBC BLD-RTO: 0.5 /100 WBCS (ref 0–0)
NRBC BLD-RTO: 0.8 /100 WBCS (ref 0–0)
NRBC BLD-RTO: 1 /100 WBCS (ref 0–0)
NRBC BLD-RTO: 1.8 /100 WBCS (ref 0–0)
NRBC BLD-RTO: 2.2 /100 WBCS (ref 0–0)
OXYHGB MFR BLDV: 71.3 % (ref 45–75)
OXYHGB MFR BLDV: 83.5 % (ref 45–75)
OXYHGB MFR BLDV: 96.9 % (ref 45–75)
PATH REVIEW - SERUM IMMUNOFIXATION: ABNORMAL
PATH REVIEW-SERUM PROTEIN ELECTROPHORESIS: ABNORMAL
PATH REVIEW-URINE PROTEIN ELECTROPHORESIS: NORMAL
PCO2 BLDV: 37 MM HG (ref 41–51)
PCO2 BLDV: 42 MM HG (ref 41–51)
PCO2 BLDV: 45 MM HG (ref 41–51)
PH BLDV: 7.35 PH (ref 7.33–7.43)
PH BLDV: 7.39 PH (ref 7.33–7.43)
PH BLDV: 7.41 PH (ref 7.33–7.43)
PH UR STRIP.AUTO: 5 [PH]
PHOSPHATE SERPL-MCNC: 3.9 MG/DL (ref 2.5–4.9)
PHOSPHATE SERPL-MCNC: 4.1 MG/DL (ref 2.5–4.9)
PHOSPHATE SERPL-MCNC: 4.1 MG/DL (ref 2.5–4.9)
PHOSPHATE SERPL-MCNC: 4.3 MG/DL (ref 2.5–4.9)
PHOSPHATE SERPL-MCNC: 4.4 MG/DL (ref 2.5–4.9)
PHOSPHATE SERPL-MCNC: 4.5 MG/DL (ref 2.5–4.9)
PHOSPHATE SERPL-MCNC: 4.5 MG/DL (ref 2.5–4.9)
PHOSPHATE SERPL-MCNC: 4.6 MG/DL (ref 2.5–4.9)
PHOSPHATE SERPL-MCNC: 4.7 MG/DL (ref 2.5–4.9)
PHOSPHATE SERPL-MCNC: 5.1 MG/DL (ref 2.5–4.9)
PHOSPHATE SERPL-MCNC: 5.1 MG/DL (ref 2.5–4.9)
PHOSPHATE SERPL-MCNC: 5.2 MG/DL (ref 2.5–4.9)
PHOSPHATE SERPL-MCNC: 5.3 MG/DL (ref 2.5–4.9)
PHOSPHATE SERPL-MCNC: 5.6 MG/DL (ref 2.5–4.9)
PHOSPHATE SERPL-MCNC: 5.7 MG/DL (ref 2.5–4.9)
PHOSPHATE SERPL-MCNC: 6 MG/DL (ref 2.5–4.9)
PHOSPHATE SERPL-MCNC: 6.1 MG/DL (ref 2.5–4.9)
PLATELET # BLD AUTO: 309 X10*3/UL (ref 150–450)
PLATELET # BLD AUTO: 311 X10*3/UL (ref 150–450)
PLATELET # BLD AUTO: 312 X10*3/UL (ref 150–450)
PLATELET # BLD AUTO: 315 X10*3/UL (ref 150–450)
PLATELET # BLD AUTO: 317 X10*3/UL (ref 150–450)
PLATELET # BLD AUTO: 321 X10*3/UL (ref 150–450)
PLATELET # BLD AUTO: 330 X10*3/UL (ref 150–450)
PLATELET # BLD AUTO: 341 X10*3/UL (ref 150–450)
PLATELET # BLD AUTO: 363 X10*3/UL (ref 150–450)
PLATELET # BLD AUTO: 364 X10*3/UL (ref 150–450)
PLATELET # BLD AUTO: 371 X10*3/UL (ref 150–450)
PLATELET # BLD AUTO: 381 X10*3/UL (ref 150–450)
PLATELET # BLD AUTO: 383 X10*3/UL (ref 150–450)
PLATELET # BLD AUTO: 393 X10*3/UL (ref 150–450)
PLATELET # BLD AUTO: 395 X10*3/UL (ref 150–450)
PLATELETS (10*3/UL) IN BLOOD AUTOMATED COUNT: 387 X10E9/L (ref 150–450)
PMV BLD AUTO: 10.6 FL (ref 7.5–11.5)
PMV BLD AUTO: 10.7 FL (ref 7.5–11.5)
PMV BLD AUTO: 10.8 FL (ref 7.5–11.5)
PMV BLD AUTO: 10.8 FL (ref 7.5–11.5)
PMV BLD AUTO: 10.9 FL (ref 7.5–11.5)
PMV BLD AUTO: 11 FL (ref 7.5–11.5)
PMV BLD AUTO: 11 FL (ref 7.5–11.5)
PMV BLD AUTO: 11.1 FL (ref 7.5–11.5)
PMV BLD AUTO: 11.1 FL (ref 7.5–11.5)
PMV BLD AUTO: 11.2 FL (ref 7.5–11.5)
PMV BLD AUTO: 11.3 FL (ref 7.5–11.5)
PMV BLD AUTO: 11.4 FL (ref 7.5–11.5)
PMV BLD AUTO: 11.7 FL (ref 7.5–11.5)
PO2 BLDV: 130 MM HG (ref 35–45)
PO2 BLDV: 47 MM HG (ref 35–45)
PO2 BLDV: 62 MM HG (ref 35–45)
POTASSIUM (MMOL/L) IN SER/PLAS: 3.8 MMOL/L (ref 3.5–5.3)
POTASSIUM BLDV-SCNC: 5.2 MMOL/L (ref 3.5–5.3)
POTASSIUM BLDV-SCNC: 5.4 MMOL/L (ref 3.5–5.3)
POTASSIUM SERPL-SCNC: 3.5 MMOL/L (ref 3.5–5.3)
POTASSIUM SERPL-SCNC: 3.7 MMOL/L (ref 3.5–5.3)
POTASSIUM SERPL-SCNC: 3.8 MMOL/L (ref 3.5–5.3)
POTASSIUM SERPL-SCNC: 3.9 MMOL/L (ref 3.5–5.3)
POTASSIUM SERPL-SCNC: 4 MMOL/L (ref 3.5–5.3)
POTASSIUM SERPL-SCNC: 4 MMOL/L (ref 3.5–5.3)
POTASSIUM SERPL-SCNC: 4.1 MMOL/L (ref 3.5–5.3)
POTASSIUM SERPL-SCNC: 4.2 MMOL/L (ref 3.5–5.3)
POTASSIUM SERPL-SCNC: 4.3 MMOL/L (ref 3.5–5.3)
POTASSIUM SERPL-SCNC: 4.4 MMOL/L (ref 3.5–5.3)
POTASSIUM SERPL-SCNC: 4.5 MMOL/L (ref 3.5–5.3)
POTASSIUM SERPL-SCNC: 4.9 MMOL/L (ref 3.5–5.3)
POTASSIUM SERPL-SCNC: 5 MMOL/L (ref 3.5–5.3)
POTASSIUM SERPL-SCNC: 5.2 MMOL/L (ref 3.5–5.3)
POTASSIUM SERPL-SCNC: 5.3 MMOL/L (ref 3.5–5.3)
POTASSIUM UR-SCNC: 35 MMOL/L
POTASSIUM UR-SCNC: 60 MMOL/L
POTASSIUM UR-SCNC: 62 MMOL/L
POTASSIUM/CREAT UR-RTO: 14 MMOL/G CREAT
POTASSIUM/CREAT UR-RTO: 36 MMOL/G CREAT
POTASSIUM/CREAT UR-RTO: 8 MMOL/G CREAT
PROT SERPL-MCNC: 5.6 G/DL (ref 6.4–8.2)
PROT SERPL-MCNC: 5.7 G/DL (ref 6.4–8.2)
PROT SERPL-MCNC: 6.1 G/DL (ref 6.4–8.2)
PROT UR STRIP.AUTO-MCNC: ABNORMAL MG/DL
PROT UR STRIP.AUTO-MCNC: ABNORMAL MG/DL
PROT UR STRIP.AUTO-MCNC: NEGATIVE MG/DL
PROT UR-ACNC: 292 MG/DL (ref 5–25)
PROT UR-ACNC: 323 MG/DL
PROT/CREAT UR: 0.68 MG/MG CREAT (ref 0–0.17)
PROTEIN ELECTROPHORESIS COMMENT: ABNORMAL
PROTEIN TOTAL: 6.2 G/DL (ref 6.4–8.2)
PROTEINASE3 AB SER-ACNC: 4 AU/ML (ref 0–19)
PROTHROMBIN TIME: 12.7 SECONDS (ref 9.8–12.8)
Q ONSET: 211 MS
QRS COUNT: 17 BEATS
QRS DURATION: 96 MS
QT INTERVAL: 356 MS
QTC CALCULATION(BAZETT): 470 MS
QTC FREDERICIA: 428 MS
R AXIS: 183 DEGREES
RBC # BLD AUTO: 4.65 X10*6/UL (ref 4.5–5.9)
RBC # BLD AUTO: 4.79 X10*6/UL (ref 4.5–5.9)
RBC # BLD AUTO: 4.87 X10*6/UL (ref 4.5–5.9)
RBC # BLD AUTO: 4.9 X10*6/UL (ref 4.5–5.9)
RBC # BLD AUTO: 5.01 X10*6/UL (ref 4.5–5.9)
RBC # BLD AUTO: 5.04 X10*6/UL (ref 4.5–5.9)
RBC # BLD AUTO: 5.11 X10*6/UL (ref 4.5–5.9)
RBC # BLD AUTO: 5.13 X10*6/UL (ref 4.5–5.9)
RBC # BLD AUTO: 5.16 X10*6/UL (ref 4.5–5.9)
RBC # BLD AUTO: 5.19 X10*6/UL (ref 4.5–5.9)
RBC # BLD AUTO: 5.24 X10*6/UL (ref 4.5–5.9)
RBC # BLD AUTO: 5.28 X10*6/UL (ref 4.5–5.9)
RBC # BLD AUTO: 5.29 X10*6/UL (ref 4.5–5.9)
RBC # BLD AUTO: 5.35 X10*6/UL (ref 4.5–5.9)
RBC # BLD AUTO: 5.4 X10*6/UL (ref 4.5–5.9)
RBC # UR STRIP.AUTO: ABNORMAL /UL
RBC #/AREA URNS AUTO: >20 /HPF
RH FACTOR (ANTIGEN D): NORMAL
SAO2 % BLDV: 73 % (ref 45–75)
SAO2 % BLDV: 86 % (ref 45–75)
SAO2 % BLDV: 99 % (ref 45–75)
SODIUM (MMOL/L) IN SER/PLAS: 144 MMOL/L (ref 136–145)
SODIUM BLDV-SCNC: 130 MMOL/L (ref 136–145)
SODIUM BLDV-SCNC: 130 MMOL/L (ref 136–145)
SODIUM SERPL-SCNC: 137 MMOL/L (ref 136–145)
SODIUM SERPL-SCNC: 138 MMOL/L (ref 136–145)
SODIUM SERPL-SCNC: 139 MMOL/L (ref 136–145)
SODIUM SERPL-SCNC: 141 MMOL/L (ref 136–145)
SODIUM SERPL-SCNC: 142 MMOL/L (ref 136–145)
SODIUM SERPL-SCNC: 143 MMOL/L (ref 136–145)
SODIUM SERPL-SCNC: 143 MMOL/L (ref 136–145)
SODIUM SERPL-SCNC: 144 MMOL/L (ref 136–145)
SODIUM SERPL-SCNC: 145 MMOL/L (ref 136–145)
SODIUM SERPL-SCNC: 146 MMOL/L (ref 136–145)
SODIUM UR-SCNC: 12 MMOL/L
SODIUM UR-SCNC: 16 MMOL/L
SODIUM UR-SCNC: 18 MMOL/L
SODIUM/CREAT UR-RTO: 10 MMOL/G CREAT
SODIUM/CREAT UR-RTO: 3 MMOL/G CREAT
SODIUM/CREAT UR-RTO: 4 MMOL/G CREAT
SP GR UR STRIP.AUTO: 1.01
SP GR UR STRIP.AUTO: 1.02
SP GR UR STRIP.AUTO: 1.02
SQUAMOUS #/AREA URNS AUTO: ABNORMAL /HPF
T AXIS: 99 DEGREES
T OFFSET: 389 MS
TEST COMMENT: ABNORMAL
THYROTROPIN (MIU/L) IN SER/PLAS BY DETECTION LIMIT <= 0.05 MIU/L: 5.46 MIU/L (ref 0.44–3.98)
THYROXINE (T4) FREE (NG/DL) IN SER/PLAS: 0.98 NG/DL (ref 0.78–1.48)
TRIGLYCERIDE (MG/DL) IN SER/PLAS: 85 MG/DL (ref 0–149)
UFH PPP CHRO-ACNC: 0.6 IU/ML
UFH PPP CHRO-ACNC: 0.7 IU/ML
UREA NITROGEN (MG/DL) IN SER/PLAS: 16 MG/DL (ref 6–23)
UREA/CREAT UR-SRTO: 0.5 G/G CREAT
URINE ELECTROPHORESIS COMMENT: NORMAL
UROBILINOGEN UR STRIP.AUTO-MCNC: <2 MG/DL
UUN UR-MCNC: 232 MG/DL
VENTRICULAR RATE: 105 BPM
VLDL: 17 MG/DL (ref 0–40)
WBC # BLD AUTO: 10.5 X10*3/UL (ref 4.4–11.3)
WBC # BLD AUTO: 10.6 X10*3/UL (ref 4.4–11.3)
WBC # BLD AUTO: 10.7 X10*3/UL (ref 4.4–11.3)
WBC # BLD AUTO: 10.7 X10*3/UL (ref 4.4–11.3)
WBC # BLD AUTO: 10.9 X10*3/UL (ref 4.4–11.3)
WBC # BLD AUTO: 11 X10*3/UL (ref 4.4–11.3)
WBC # BLD AUTO: 11.1 X10*3/UL (ref 4.4–11.3)
WBC # BLD AUTO: 11.1 X10*3/UL (ref 4.4–11.3)
WBC # BLD AUTO: 11.6 X10*3/UL (ref 4.4–11.3)
WBC # BLD AUTO: 11.6 X10*3/UL (ref 4.4–11.3)
WBC # BLD AUTO: 11.7 X10*3/UL (ref 4.4–11.3)
WBC # BLD AUTO: 11.7 X10*3/UL (ref 4.4–11.3)
WBC # BLD AUTO: 11.8 X10*3/UL (ref 4.4–11.3)
WBC #/AREA URNS AUTO: >50 /HPF
WBC #/AREA URNS AUTO: ABNORMAL /HPF
WBC #/AREA URNS AUTO: ABNORMAL /HPF

## 2023-01-01 PROCEDURE — 2500000001 HC RX 250 WO HCPCS SELF ADMINISTERED DRUGS (ALT 637 FOR MEDICARE OP): Performed by: STUDENT IN AN ORGANIZED HEALTH CARE EDUCATION/TRAINING PROGRAM

## 2023-01-01 PROCEDURE — 2500000004 HC RX 250 GENERAL PHARMACY W/ HCPCS (ALT 636 FOR OP/ED)

## 2023-01-01 PROCEDURE — 1200000002 HC GENERAL ROOM WITH TELEMETRY DAILY

## 2023-01-01 PROCEDURE — 36415 COLL VENOUS BLD VENIPUNCTURE: CPT

## 2023-01-01 PROCEDURE — 2500000004 HC RX 250 GENERAL PHARMACY W/ HCPCS (ALT 636 FOR OP/ED): Performed by: STUDENT IN AN ORGANIZED HEALTH CARE EDUCATION/TRAINING PROGRAM

## 2023-01-01 PROCEDURE — 99233 SBSQ HOSP IP/OBS HIGH 50: CPT | Performed by: STUDENT IN AN ORGANIZED HEALTH CARE EDUCATION/TRAINING PROGRAM

## 2023-01-01 PROCEDURE — 1036F TOBACCO NON-USER: CPT

## 2023-01-01 PROCEDURE — 99232 SBSQ HOSP IP/OBS MODERATE 35: CPT | Performed by: INTERNAL MEDICINE

## 2023-01-01 PROCEDURE — 82805 BLOOD GASES W/O2 SATURATION: CPT

## 2023-01-01 PROCEDURE — 84484 ASSAY OF TROPONIN QUANT: CPT | Performed by: STUDENT IN AN ORGANIZED HEALTH CARE EDUCATION/TRAINING PROGRAM

## 2023-01-01 PROCEDURE — 80053 COMPREHEN METABOLIC PANEL: CPT | Performed by: EMERGENCY MEDICINE

## 2023-01-01 PROCEDURE — 2500000001 HC RX 250 WO HCPCS SELF ADMINISTERED DRUGS (ALT 637 FOR MEDICARE OP)

## 2023-01-01 PROCEDURE — 74018 RADEX ABDOMEN 1 VIEW: CPT | Performed by: RADIOLOGY

## 2023-01-01 PROCEDURE — 80069 RENAL FUNCTION PANEL: CPT

## 2023-01-01 PROCEDURE — 85027 COMPLETE CBC AUTOMATED: CPT | Performed by: EMERGENCY MEDICINE

## 2023-01-01 PROCEDURE — 93325 DOPPLER ECHO COLOR FLOW MAPG: CPT

## 2023-01-01 PROCEDURE — 93005 ELECTROCARDIOGRAM TRACING: CPT

## 2023-01-01 PROCEDURE — 97161 PT EVAL LOW COMPLEX 20 MIN: CPT | Mod: GP

## 2023-01-01 PROCEDURE — 82947 ASSAY GLUCOSE BLOOD QUANT: CPT | Mod: CMCLAB

## 2023-01-01 PROCEDURE — C1727 CATH, BAL TIS DIS, NON-VAS: HCPCS | Performed by: HOSPITALIST

## 2023-01-01 PROCEDURE — 99233 SBSQ HOSP IP/OBS HIGH 50: CPT

## 2023-01-01 PROCEDURE — 81001 URINALYSIS AUTO W/SCOPE: CPT

## 2023-01-01 PROCEDURE — 82570 ASSAY OF URINE CREATININE: CPT

## 2023-01-01 PROCEDURE — 99233 SBSQ HOSP IP/OBS HIGH 50: CPT | Performed by: INTERNAL MEDICINE

## 2023-01-01 PROCEDURE — 82947 ASSAY GLUCOSE BLOOD QUANT: CPT

## 2023-01-01 PROCEDURE — 85025 COMPLETE CBC W/AUTO DIFF WBC: CPT

## 2023-01-01 PROCEDURE — 83516 IMMUNOASSAY NONANTIBODY: CPT | Mod: CMCLAB

## 2023-01-01 PROCEDURE — 80069 RENAL FUNCTION PANEL: CPT | Mod: CMCLAB

## 2023-01-01 PROCEDURE — 84155 ASSAY OF PROTEIN SERUM: CPT | Mod: CMCLAB | Performed by: STUDENT IN AN ORGANIZED HEALTH CARE EDUCATION/TRAINING PROGRAM

## 2023-01-01 PROCEDURE — 3430000001 HC RX 343 DIAGNOSTIC RADIOPHARMACEUTICALS: Performed by: INTERNAL MEDICINE

## 2023-01-01 PROCEDURE — 76770 US EXAM ABDO BACK WALL COMP: CPT

## 2023-01-01 PROCEDURE — 83735 ASSAY OF MAGNESIUM: CPT | Mod: CMCLAB

## 2023-01-01 PROCEDURE — 83735 ASSAY OF MAGNESIUM: CPT

## 2023-01-01 PROCEDURE — 2500000005 HC RX 250 GENERAL PHARMACY W/O HCPCS: Performed by: HOSPITALIST

## 2023-01-01 PROCEDURE — 81001 URINALYSIS AUTO W/SCOPE: CPT | Mod: CMCLAB

## 2023-01-01 PROCEDURE — 93451 RIGHT HEART CATH: CPT | Performed by: HOSPITALIST

## 2023-01-01 PROCEDURE — 51702 INSERT TEMP BLADDER CATH: CPT

## 2023-01-01 PROCEDURE — 74176 CT ABD & PELVIS W/O CONTRAST: CPT | Mod: MG

## 2023-01-01 PROCEDURE — A9552 F18 FDG: HCPCS | Performed by: INTERNAL MEDICINE

## 2023-01-01 PROCEDURE — 36415 COLL VENOUS BLD VENIPUNCTURE: CPT | Mod: CMCLAB

## 2023-01-01 PROCEDURE — 99232 SBSQ HOSP IP/OBS MODERATE 35: CPT

## 2023-01-01 PROCEDURE — 71045 X-RAY EXAM CHEST 1 VIEW: CPT | Performed by: RADIOLOGY

## 2023-01-01 PROCEDURE — 93321 DOPPLER ECHO F-UP/LMTD STD: CPT | Performed by: INTERNAL MEDICINE

## 2023-01-01 PROCEDURE — 99214 OFFICE O/P EST MOD 30 MIN: CPT

## 2023-01-01 PROCEDURE — 76775 US EXAM ABDO BACK WALL LIM: CPT | Performed by: RADIOLOGY

## 2023-01-01 PROCEDURE — 86160 COMPLEMENT ANTIGEN: CPT

## 2023-01-01 PROCEDURE — 85520 HEPARIN ASSAY: CPT

## 2023-01-01 PROCEDURE — 84156 ASSAY OF PROTEIN URINE: CPT | Mod: CMCLAB | Performed by: STUDENT IN AN ORGANIZED HEALTH CARE EDUCATION/TRAINING PROGRAM

## 2023-01-01 PROCEDURE — 1100000001 HC PRIVATE ROOM DAILY

## 2023-01-01 PROCEDURE — 2580000001 HC RX 258 IV SOLUTIONS

## 2023-01-01 PROCEDURE — 83735 ASSAY OF MAGNESIUM: CPT | Mod: CMCLAB | Performed by: STUDENT IN AN ORGANIZED HEALTH CARE EDUCATION/TRAINING PROGRAM

## 2023-01-01 PROCEDURE — 80069 RENAL FUNCTION PANEL: CPT | Mod: CMCLAB | Performed by: STUDENT IN AN ORGANIZED HEALTH CARE EDUCATION/TRAINING PROGRAM

## 2023-01-01 PROCEDURE — 99285 EMERGENCY DEPT VISIT HI MDM: CPT | Performed by: EMERGENCY MEDICINE

## 2023-01-01 PROCEDURE — 82330 ASSAY OF CALCIUM: CPT | Mod: CMCLAB

## 2023-01-01 PROCEDURE — 96374 THER/PROPH/DIAG INJ IV PUSH: CPT

## 2023-01-01 PROCEDURE — 84166 PROTEIN E-PHORESIS/URINE/CSF: CPT | Performed by: STUDENT IN AN ORGANIZED HEALTH CARE EDUCATION/TRAINING PROGRAM

## 2023-01-01 PROCEDURE — 84133 ASSAY OF URINE POTASSIUM: CPT | Mod: CMCLAB

## 2023-01-01 PROCEDURE — 93308 TTE F-UP OR LMTD: CPT | Performed by: INTERNAL MEDICINE

## 2023-01-01 PROCEDURE — 85027 COMPLETE CBC AUTOMATED: CPT

## 2023-01-01 PROCEDURE — 76770 US EXAM ABDO BACK WALL COMP: CPT | Performed by: RADIOLOGY

## 2023-01-01 PROCEDURE — 83735 ASSAY OF MAGNESIUM: CPT | Performed by: EMERGENCY MEDICINE

## 2023-01-01 PROCEDURE — 3078F DIAST BP <80 MM HG: CPT

## 2023-01-01 PROCEDURE — 97530 THERAPEUTIC ACTIVITIES: CPT | Mod: GP

## 2023-01-01 PROCEDURE — 84165 PROTEIN E-PHORESIS SERUM: CPT | Mod: CMCLAB | Performed by: STUDENT IN AN ORGANIZED HEALTH CARE EDUCATION/TRAINING PROGRAM

## 2023-01-01 PROCEDURE — 83605 ASSAY OF LACTIC ACID: CPT | Mod: CMCLAB

## 2023-01-01 PROCEDURE — 74176 CT ABD & PELVIS W/O CONTRAST: CPT | Performed by: RADIOLOGY

## 2023-01-01 PROCEDURE — 36415 COLL VENOUS BLD VENIPUNCTURE: CPT | Performed by: STUDENT IN AN ORGANIZED HEALTH CARE EDUCATION/TRAINING PROGRAM

## 2023-01-01 PROCEDURE — 93325 DOPPLER ECHO COLOR FLOW MAPG: CPT | Performed by: INTERNAL MEDICINE

## 2023-01-01 PROCEDURE — 99222 1ST HOSP IP/OBS MODERATE 55: CPT

## 2023-01-01 PROCEDURE — 97166 OT EVAL MOD COMPLEX 45 MIN: CPT | Mod: GO | Performed by: OCCUPATIONAL THERAPIST

## 2023-01-01 PROCEDURE — 84132 ASSAY OF SERUM POTASSIUM: CPT

## 2023-01-01 PROCEDURE — 84100 ASSAY OF PHOSPHORUS: CPT

## 2023-01-01 PROCEDURE — 71045 X-RAY EXAM CHEST 1 VIEW: CPT | Mod: FY

## 2023-01-01 PROCEDURE — 99285 EMERGENCY DEPT VISIT HI MDM: CPT | Mod: 25 | Performed by: EMERGENCY MEDICINE

## 2023-01-01 PROCEDURE — 85730 THROMBOPLASTIN TIME PARTIAL: CPT | Mod: CMCLAB

## 2023-01-01 PROCEDURE — 4A023N6 MEASUREMENT OF CARDIAC SAMPLING AND PRESSURE, RIGHT HEART, PERCUTANEOUS APPROACH: ICD-10-PCS | Performed by: HOSPITALIST

## 2023-01-01 PROCEDURE — 82436 ASSAY OF URINE CHLORIDE: CPT

## 2023-01-01 PROCEDURE — 84165 PROTEIN E-PHORESIS SERUM: CPT | Performed by: STUDENT IN AN ORGANIZED HEALTH CARE EDUCATION/TRAINING PROGRAM

## 2023-01-01 PROCEDURE — 99223 1ST HOSP IP/OBS HIGH 75: CPT

## 2023-01-01 PROCEDURE — 83036 HEMOGLOBIN GLYCOSYLATED A1C: CPT

## 2023-01-01 PROCEDURE — 85025 COMPLETE CBC W/AUTO DIFF WBC: CPT | Mod: CMCLAB

## 2023-01-01 PROCEDURE — 93010 ELECTROCARDIOGRAM REPORT: CPT | Performed by: INTERNAL MEDICINE

## 2023-01-01 PROCEDURE — 2500000004 HC RX 250 GENERAL PHARMACY W/ HCPCS (ALT 636 FOR OP/ED): Mod: SE | Performed by: STUDENT IN AN ORGANIZED HEALTH CARE EDUCATION/TRAINING PROGRAM

## 2023-01-01 PROCEDURE — 84156 ASSAY OF PROTEIN URINE: CPT

## 2023-01-01 PROCEDURE — 36415 COLL VENOUS BLD VENIPUNCTURE: CPT | Mod: CMCLAB | Performed by: STUDENT IN AN ORGANIZED HEALTH CARE EDUCATION/TRAINING PROGRAM

## 2023-01-01 PROCEDURE — 99498 ADVNCD CARE PLAN ADDL 30 MIN: CPT

## 2023-01-01 PROCEDURE — C1894 INTRO/SHEATH, NON-LASER: HCPCS | Performed by: HOSPITALIST

## 2023-01-01 PROCEDURE — 93010 ELECTROCARDIOGRAM REPORT: CPT | Performed by: EMERGENCY MEDICINE

## 2023-01-01 PROCEDURE — 86334 IMMUNOFIX E-PHORESIS SERUM: CPT | Mod: CMCLAB | Performed by: STUDENT IN AN ORGANIZED HEALTH CARE EDUCATION/TRAINING PROGRAM

## 2023-01-01 PROCEDURE — 2720000007 HC OR 272 NO HCPCS: Performed by: HOSPITALIST

## 2023-01-01 PROCEDURE — 93975 VASCULAR STUDY: CPT

## 2023-01-01 PROCEDURE — 36415 COLL VENOUS BLD VENIPUNCTURE: CPT | Performed by: EMERGENCY MEDICINE

## 2023-01-01 PROCEDURE — 74018 RADEX ABDOMEN 1 VIEW: CPT | Mod: FY

## 2023-01-01 PROCEDURE — 86320 SERUM IMMUNOELECTROPHORESIS: CPT | Performed by: STUDENT IN AN ORGANIZED HEALTH CARE EDUCATION/TRAINING PROGRAM

## 2023-01-01 PROCEDURE — 84100 ASSAY OF PHOSPHORUS: CPT | Mod: CMCLAB

## 2023-01-01 PROCEDURE — 86901 BLOOD TYPING SEROLOGIC RH(D): CPT | Performed by: INTERNAL MEDICINE

## 2023-01-01 PROCEDURE — 97110 THERAPEUTIC EXERCISES: CPT | Mod: GP

## 2023-01-01 PROCEDURE — 99497 ADVNCD CARE PLAN 30 MIN: CPT

## 2023-01-01 PROCEDURE — 3074F SYST BP LT 130 MM HG: CPT

## 2023-01-01 PROCEDURE — 2500000004 HC RX 250 GENERAL PHARMACY W/ HCPCS (ALT 636 FOR OP/ED): Performed by: HOSPITALIST

## 2023-01-01 PROCEDURE — 83880 ASSAY OF NATRIURETIC PEPTIDE: CPT | Performed by: STUDENT IN AN ORGANIZED HEALTH CARE EDUCATION/TRAINING PROGRAM

## 2023-01-01 PROCEDURE — 5A12012 PERFORMANCE OF CARDIAC OUTPUT, SINGLE, MANUAL: ICD-10-PCS | Performed by: INTERNAL MEDICINE

## 2023-01-01 PROCEDURE — C1769 GUIDE WIRE: HCPCS | Performed by: HOSPITALIST

## 2023-01-01 RX ORDER — ACETAMINOPHEN 325 MG/1
650 TABLET ORAL EVERY 6 HOURS PRN
Status: DISCONTINUED | OUTPATIENT
Start: 2023-01-01 | End: 2023-01-01

## 2023-01-01 RX ORDER — AMLODIPINE BESYLATE 5 MG/1
5 TABLET ORAL DAILY
Qty: 30 TABLET | Refills: 2 | Status: SHIPPED
Start: 2023-01-01 | End: 2023-01-01 | Stop reason: ENTERED-IN-ERROR

## 2023-01-01 RX ORDER — LISINOPRIL 40 MG/1
40 TABLET ORAL DAILY
Qty: 30 TABLET | Refills: 2 | Status: SHIPPED
Start: 2023-01-01 | End: 2023-10-23 | Stop reason: CLARIF

## 2023-01-01 RX ORDER — LIDOCAINE HYDROCHLORIDE 10 MG/ML
INJECTION, SOLUTION EPIDURAL; INFILTRATION; INTRACAUDAL; PERINEURAL AS NEEDED
Status: DISCONTINUED | OUTPATIENT
Start: 2023-01-01 | End: 2023-01-01 | Stop reason: HOSPADM

## 2023-01-01 RX ORDER — BUMETANIDE 0.25 MG/ML
0.5 INJECTION INTRAMUSCULAR; INTRAVENOUS ONCE
Status: DISCONTINUED | OUTPATIENT
Start: 2023-01-01 | End: 2023-01-01

## 2023-01-01 RX ORDER — BUMETANIDE 0.25 MG/ML
4 INJECTION INTRAMUSCULAR; INTRAVENOUS ONCE
Status: COMPLETED | OUTPATIENT
Start: 2023-01-01 | End: 2023-01-01

## 2023-01-01 RX ORDER — BUMETANIDE 0.25 MG/ML
2 INJECTION INTRAMUSCULAR; INTRAVENOUS ONCE
Status: COMPLETED | OUTPATIENT
Start: 2023-01-01 | End: 2023-01-01

## 2023-01-01 RX ORDER — POLYETHYLENE GLYCOL 3350 17 G/17G
17 POWDER, FOR SOLUTION ORAL 2 TIMES DAILY
Status: DISCONTINUED | OUTPATIENT
Start: 2023-01-01 | End: 2023-01-01 | Stop reason: HOSPADM

## 2023-01-01 RX ORDER — METOPROLOL TARTRATE 25 MG/1
25 TABLET, FILM COATED ORAL EVERY 6 HOURS
Status: DISCONTINUED | OUTPATIENT
Start: 2023-01-01 | End: 2023-01-01 | Stop reason: HOSPADM

## 2023-01-01 RX ORDER — AMLODIPINE BESYLATE 5 MG/1
1 TABLET ORAL DAILY
COMMUNITY
End: 2023-01-01 | Stop reason: SDUPTHER

## 2023-01-01 RX ORDER — LANOLIN ALCOHOL/MO/W.PET/CERES
800 CREAM (GRAM) TOPICAL DAILY
Status: DISCONTINUED | OUTPATIENT
Start: 2023-01-01 | End: 2023-01-01 | Stop reason: HOSPADM

## 2023-01-01 RX ORDER — FUROSEMIDE 10 MG/ML
80 INJECTION INTRAMUSCULAR; INTRAVENOUS ONCE
Status: COMPLETED | OUTPATIENT
Start: 2023-01-01 | End: 2023-01-01

## 2023-01-01 RX ORDER — TRAMADOL HYDROCHLORIDE 50 MG/1
50 TABLET ORAL EVERY 6 HOURS PRN
Status: DISCONTINUED | OUTPATIENT
Start: 2023-01-01 | End: 2023-01-01 | Stop reason: HOSPADM

## 2023-01-01 RX ORDER — CALCIUM CARBONATE 200(500)MG
500 TABLET,CHEWABLE ORAL 3 TIMES DAILY PRN
Status: DISCONTINUED | OUTPATIENT
Start: 2023-01-01 | End: 2023-01-01 | Stop reason: HOSPADM

## 2023-01-01 RX ORDER — ACETAMINOPHEN 325 MG/1
975 TABLET ORAL EVERY 8 HOURS SCHEDULED
Status: DISCONTINUED | OUTPATIENT
Start: 2023-01-01 | End: 2023-01-01 | Stop reason: HOSPADM

## 2023-01-01 RX ORDER — HEPARIN SODIUM 10000 [USP'U]/100ML
0-4500 INJECTION, SOLUTION INTRAVENOUS CONTINUOUS
Status: DISCONTINUED | OUTPATIENT
Start: 2023-01-01 | End: 2023-01-01 | Stop reason: HOSPADM

## 2023-01-01 RX ORDER — MAGNESIUM SULFATE HEPTAHYDRATE 40 MG/ML
2 INJECTION, SOLUTION INTRAVENOUS ONCE
Status: COMPLETED | OUTPATIENT
Start: 2023-01-01 | End: 2023-01-01

## 2023-01-01 RX ORDER — FUROSEMIDE 40 MG/1
1 TABLET ORAL DAILY
COMMUNITY
Start: 2016-07-26 | End: 2023-01-01 | Stop reason: ENTERED-IN-ERROR

## 2023-01-01 RX ORDER — HYDRALAZINE HYDROCHLORIDE 50 MG/1
1 TABLET, FILM COATED ORAL 3 TIMES DAILY
COMMUNITY
Start: 2021-08-26 | End: 2023-01-01 | Stop reason: ENTERED-IN-ERROR

## 2023-01-01 RX ORDER — SODIUM CHLORIDE, SODIUM LACTATE, POTASSIUM CHLORIDE, CALCIUM CHLORIDE 600; 310; 30; 20 MG/100ML; MG/100ML; MG/100ML; MG/100ML
100 INJECTION, SOLUTION INTRAVENOUS CONTINUOUS
Status: ACTIVE | OUTPATIENT
Start: 2023-01-01 | End: 2023-01-01

## 2023-01-01 RX ORDER — AMLODIPINE BESYLATE 5 MG/1
5 TABLET ORAL DAILY
Status: DISCONTINUED | OUTPATIENT
Start: 2023-01-01 | End: 2023-01-01

## 2023-01-01 RX ORDER — FUROSEMIDE 10 MG/ML
40 INJECTION INTRAMUSCULAR; INTRAVENOUS ONCE
Status: COMPLETED | OUTPATIENT
Start: 2023-01-01 | End: 2023-01-01

## 2023-01-01 RX ORDER — METOLAZONE 5 MG/1
5 TABLET ORAL ONCE
Status: COMPLETED | OUTPATIENT
Start: 2023-01-01 | End: 2023-01-01

## 2023-01-01 RX ORDER — BUMETANIDE 0.25 MG/ML
3 INJECTION INTRAMUSCULAR; INTRAVENOUS ONCE
Status: COMPLETED | OUTPATIENT
Start: 2023-01-01 | End: 2023-01-01

## 2023-01-01 RX ORDER — HEPARIN SODIUM 5000 [USP'U]/ML
7500 INJECTION, SOLUTION INTRAVENOUS; SUBCUTANEOUS EVERY 8 HOURS SCHEDULED
Status: DISCONTINUED | OUTPATIENT
Start: 2023-01-01 | End: 2023-01-01

## 2023-01-01 RX ORDER — LISINOPRIL 40 MG/1
1 TABLET ORAL DAILY
COMMUNITY
Start: 2018-02-22 | End: 2023-01-01 | Stop reason: SDUPTHER

## 2023-01-01 RX ORDER — LIDOCAINE HYDROCHLORIDE 20 MG/ML
1 JELLY TOPICAL ONCE
Status: DISCONTINUED | OUTPATIENT
Start: 2023-01-01 | End: 2023-01-01 | Stop reason: HOSPADM

## 2023-01-01 RX ORDER — ONDANSETRON HYDROCHLORIDE 2 MG/ML
INJECTION, SOLUTION INTRAVENOUS AS NEEDED
Status: DISCONTINUED | OUTPATIENT
Start: 2023-01-01 | End: 2023-01-01 | Stop reason: HOSPADM

## 2023-01-01 RX ORDER — FLUDEOXYGLUCOSE F 18 200 MCI/ML
11.49 INJECTION, SOLUTION INTRAVENOUS
Status: COMPLETED | OUTPATIENT
Start: 2023-01-01 | End: 2023-01-01

## 2023-01-01 RX ORDER — BUMETANIDE 0.25 MG/ML
4 INJECTION INTRAMUSCULAR; INTRAVENOUS ONCE
Status: DISCONTINUED | OUTPATIENT
Start: 2023-01-01 | End: 2023-01-01

## 2023-01-01 RX ORDER — FUROSEMIDE 10 MG/ML
120 INJECTION INTRAMUSCULAR; INTRAVENOUS EVERY 24 HOURS
Status: DISCONTINUED | OUTPATIENT
Start: 2023-01-01 | End: 2023-01-01

## 2023-01-01 RX ORDER — ATORVASTATIN CALCIUM 40 MG/1
1 TABLET, FILM COATED ORAL NIGHTLY
COMMUNITY
Start: 2020-11-05

## 2023-01-01 RX ORDER — CALCIUM CARBONATE 200(500)MG
500 TABLET,CHEWABLE ORAL 4 TIMES DAILY PRN
Status: DISCONTINUED | OUTPATIENT
Start: 2023-01-01 | End: 2023-01-01

## 2023-01-01 RX ORDER — HEPARIN SODIUM 5000 [USP'U]/ML
5000-10000 INJECTION, SOLUTION INTRAVENOUS; SUBCUTANEOUS EVERY 4 HOURS PRN
Status: DISCONTINUED | OUTPATIENT
Start: 2023-01-01 | End: 2023-01-01 | Stop reason: HOSPADM

## 2023-01-01 RX ORDER — ATORVASTATIN CALCIUM 40 MG/1
40 TABLET, FILM COATED ORAL DAILY
Status: DISCONTINUED | OUTPATIENT
Start: 2023-01-01 | End: 2023-01-01 | Stop reason: HOSPADM

## 2023-01-01 RX ORDER — METOPROLOL TARTRATE 25 MG/1
12.5 TABLET, FILM COATED ORAL EVERY 6 HOURS
Status: DISCONTINUED | OUTPATIENT
Start: 2023-01-01 | End: 2023-01-01

## 2023-01-01 RX ORDER — AMOXICILLIN 250 MG
2 CAPSULE ORAL ONCE
Status: COMPLETED | OUTPATIENT
Start: 2023-01-01 | End: 2023-01-01

## 2023-01-01 RX ORDER — TORSEMIDE 20 MG/1
20 TABLET ORAL
Status: DISCONTINUED | OUTPATIENT
Start: 2023-01-01 | End: 2023-01-01 | Stop reason: HOSPADM

## 2023-01-01 RX ORDER — HYDRALAZINE HYDROCHLORIDE 50 MG/1
50 TABLET, FILM COATED ORAL 3 TIMES DAILY
Status: DISCONTINUED | OUTPATIENT
Start: 2023-01-01 | End: 2023-01-01

## 2023-01-01 RX ORDER — BUMETANIDE 0.25 MG/ML
2 INJECTION INTRAMUSCULAR; INTRAVENOUS ONCE
Status: DISCONTINUED | OUTPATIENT
Start: 2023-01-01 | End: 2023-01-01

## 2023-01-01 RX ORDER — ATENOLOL 100 MG/1
1 TABLET ORAL DAILY
COMMUNITY
Start: 2016-12-14

## 2023-01-01 RX ORDER — CALCIUM CARBONATE 200(500)MG
1000 TABLET,CHEWABLE ORAL 3 TIMES DAILY PRN
Status: DISCONTINUED | OUTPATIENT
Start: 2023-01-01 | End: 2023-01-01

## 2023-01-01 RX ADMIN — POLYETHYLENE GLYCOL 3350 17 G: 17 POWDER, FOR SOLUTION ORAL at 10:47

## 2023-01-01 RX ADMIN — METOPROLOL TARTRATE 12.5 MG: 25 TABLET, FILM COATED ORAL at 00:44

## 2023-01-01 RX ADMIN — APIXABAN 5 MG: 5 TABLET, FILM COATED ORAL at 18:21

## 2023-01-01 RX ADMIN — APIXABAN 5 MG: 5 TABLET, FILM COATED ORAL at 03:09

## 2023-01-01 RX ADMIN — METOPROLOL TARTRATE 25 MG: 25 TABLET, FILM COATED ORAL at 03:45

## 2023-01-01 RX ADMIN — METOPROLOL TARTRATE 25 MG: 25 TABLET, FILM COATED ORAL at 10:51

## 2023-01-01 RX ADMIN — MAGNESIUM OXIDE TAB 400 MG (241.3 MG ELEMENTAL MG) 800 MG: 400 (241.3 MG) TAB at 11:52

## 2023-01-01 RX ADMIN — METOPROLOL TARTRATE 25 MG: 25 TABLET, FILM COATED ORAL at 22:09

## 2023-01-01 RX ADMIN — TORSEMIDE 20 MG: 20 TABLET ORAL at 08:47

## 2023-01-01 RX ADMIN — MAGNESIUM OXIDE TAB 400 MG (241.3 MG ELEMENTAL MG) 800 MG: 400 (241.3 MG) TAB at 10:19

## 2023-01-01 RX ADMIN — METOPROLOL TARTRATE 25 MG: 25 TABLET, FILM COATED ORAL at 16:24

## 2023-01-01 RX ADMIN — ACETAMINOPHEN 975 MG: 325 TABLET ORAL at 22:08

## 2023-01-01 RX ADMIN — TORSEMIDE 20 MG: 20 TABLET ORAL at 11:53

## 2023-01-01 RX ADMIN — MAGNESIUM OXIDE TAB 400 MG (241.3 MG ELEMENTAL MG) 800 MG: 400 (241.3 MG) TAB at 09:41

## 2023-01-01 RX ADMIN — METOPROLOL TARTRATE 25 MG: 25 TABLET, FILM COATED ORAL at 09:12

## 2023-01-01 RX ADMIN — ATORVASTATIN CALCIUM 40 MG: 40 TABLET, FILM COATED ORAL at 09:41

## 2023-01-01 RX ADMIN — APIXABAN 5 MG: 5 TABLET, FILM COATED ORAL at 15:06

## 2023-01-01 RX ADMIN — APIXABAN 5 MG: 5 TABLET, FILM COATED ORAL at 17:53

## 2023-01-01 RX ADMIN — APIXABAN 5 MG: 5 TABLET, FILM COATED ORAL at 02:23

## 2023-01-01 RX ADMIN — APIXABAN 5 MG: 5 TABLET, FILM COATED ORAL at 01:55

## 2023-01-01 RX ADMIN — BUMETANIDE 2 MG/HR: 0.25 INJECTION, SOLUTION INTRAMUSCULAR; INTRAVENOUS at 09:12

## 2023-01-01 RX ADMIN — BUMETANIDE 4 MG: 0.25 INJECTION, SOLUTION INTRAMUSCULAR; INTRAVENOUS at 21:55

## 2023-01-01 RX ADMIN — SODIUM CHLORIDE, POTASSIUM CHLORIDE, SODIUM LACTATE AND CALCIUM CHLORIDE 500 ML: 600; 310; 30; 20 INJECTION, SOLUTION INTRAVENOUS at 16:44

## 2023-01-01 RX ADMIN — METOPROLOL TARTRATE 25 MG: 25 TABLET, FILM COATED ORAL at 15:45

## 2023-01-01 RX ADMIN — METOPROLOL TARTRATE 25 MG: 25 TABLET, FILM COATED ORAL at 11:33

## 2023-01-01 RX ADMIN — APIXABAN 5 MG: 5 TABLET, FILM COATED ORAL at 14:25

## 2023-01-01 RX ADMIN — METOPROLOL TARTRATE 25 MG: 25 TABLET, FILM COATED ORAL at 04:26

## 2023-01-01 RX ADMIN — FUROSEMIDE 80 MG: 10 INJECTION, SOLUTION INTRAVENOUS at 20:37

## 2023-01-01 RX ADMIN — MAGNESIUM OXIDE TAB 400 MG (241.3 MG ELEMENTAL MG) 800 MG: 400 (241.3 MG) TAB at 08:54

## 2023-01-01 RX ADMIN — ACETAMINOPHEN 975 MG: 325 TABLET ORAL at 01:45

## 2023-01-01 RX ADMIN — METOPROLOL TARTRATE 25 MG: 25 TABLET, FILM COATED ORAL at 22:04

## 2023-01-01 RX ADMIN — ACETAMINOPHEN 975 MG: 325 TABLET ORAL at 22:04

## 2023-01-01 RX ADMIN — METOPROLOL TARTRATE 25 MG: 25 TABLET, FILM COATED ORAL at 05:20

## 2023-01-01 RX ADMIN — BUMETANIDE 4 MG: 0.25 INJECTION INTRAMUSCULAR; INTRAVENOUS at 17:03

## 2023-01-01 RX ADMIN — ATORVASTATIN CALCIUM 40 MG: 40 TABLET, FILM COATED ORAL at 12:07

## 2023-01-01 RX ADMIN — METOPROLOL TARTRATE 12.5 MG: 25 TABLET, FILM COATED ORAL at 19:21

## 2023-01-01 RX ADMIN — METOPROLOL TARTRATE 12.5 MG: 25 TABLET, FILM COATED ORAL at 12:28

## 2023-01-01 RX ADMIN — BUMETANIDE 4 MG: 0.25 INJECTION, SOLUTION INTRAMUSCULAR; INTRAVENOUS at 15:40

## 2023-01-01 RX ADMIN — METOPROLOL TARTRATE 25 MG: 25 TABLET, FILM COATED ORAL at 04:40

## 2023-01-01 RX ADMIN — APIXABAN 5 MG: 5 TABLET, FILM COATED ORAL at 02:46

## 2023-01-01 RX ADMIN — TORSEMIDE 20 MG: 20 TABLET ORAL at 16:19

## 2023-01-01 RX ADMIN — APIXABAN 5 MG: 5 TABLET, FILM COATED ORAL at 15:42

## 2023-01-01 RX ADMIN — CALCIUM CARBONATE (ANTACID) CHEW TAB 500 MG 1000 MG: 500 CHEW TAB at 00:55

## 2023-01-01 RX ADMIN — APIXABAN 5 MG: 5 TABLET, FILM COATED ORAL at 05:06

## 2023-01-01 RX ADMIN — BUMETANIDE 2 MG/HR: 0.25 INJECTION, SOLUTION INTRAMUSCULAR; INTRAVENOUS at 21:32

## 2023-01-01 RX ADMIN — METOPROLOL TARTRATE 25 MG: 25 TABLET, FILM COATED ORAL at 12:10

## 2023-01-01 RX ADMIN — TRAMADOL HYDROCHLORIDE 50 MG: 50 TABLET, COATED ORAL at 16:44

## 2023-01-01 RX ADMIN — METOPROLOL TARTRATE 25 MG: 25 TABLET, FILM COATED ORAL at 17:49

## 2023-01-01 RX ADMIN — METOPROLOL TARTRATE 25 MG: 25 TABLET, FILM COATED ORAL at 21:36

## 2023-01-01 RX ADMIN — CALCIUM CARBONATE (ANTACID) CHEW TAB 500 MG 500 MG: 500 CHEW TAB at 14:57

## 2023-01-01 RX ADMIN — POLYETHYLENE GLYCOL 3350 17 G: 17 POWDER, FOR SOLUTION ORAL at 11:48

## 2023-01-01 RX ADMIN — METOPROLOL TARTRATE 25 MG: 25 TABLET, FILM COATED ORAL at 16:50

## 2023-01-01 RX ADMIN — METOPROLOL TARTRATE 25 MG: 25 TABLET, FILM COATED ORAL at 22:50

## 2023-01-01 RX ADMIN — METOPROLOL TARTRATE 25 MG: 25 TABLET, FILM COATED ORAL at 02:27

## 2023-01-01 RX ADMIN — SODIUM CHLORIDE, POTASSIUM CHLORIDE, SODIUM LACTATE AND CALCIUM CHLORIDE 100 ML/HR: 600; 310; 30; 20 INJECTION, SOLUTION INTRAVENOUS at 11:53

## 2023-01-01 RX ADMIN — APIXABAN 5 MG: 5 TABLET, FILM COATED ORAL at 14:42

## 2023-01-01 RX ADMIN — METOPROLOL TARTRATE 25 MG: 25 TABLET, FILM COATED ORAL at 06:34

## 2023-01-01 RX ADMIN — ACETAMINOPHEN 975 MG: 325 TABLET ORAL at 17:49

## 2023-01-01 RX ADMIN — HEPARIN SODIUM 3600 UNITS/HR: 10000 INJECTION, SOLUTION INTRAVENOUS at 17:23

## 2023-01-01 RX ADMIN — APIXABAN 5 MG: 5 TABLET, FILM COATED ORAL at 01:22

## 2023-01-01 RX ADMIN — FUROSEMIDE 120 MG: 10 INJECTION, SOLUTION INTRAVENOUS at 17:20

## 2023-01-01 RX ADMIN — BUMETANIDE 4 MG: 0.25 INJECTION INTRAMUSCULAR; INTRAVENOUS at 11:05

## 2023-01-01 RX ADMIN — SODIUM CHLORIDE, POTASSIUM CHLORIDE, SODIUM LACTATE AND CALCIUM CHLORIDE 500 ML: 600; 310; 30; 20 INJECTION, SOLUTION INTRAVENOUS at 20:27

## 2023-01-01 RX ADMIN — ATORVASTATIN CALCIUM 40 MG: 40 TABLET, FILM COATED ORAL at 10:47

## 2023-01-01 RX ADMIN — POLYETHYLENE GLYCOL 3350 17 G: 17 POWDER, FOR SOLUTION ORAL at 08:54

## 2023-01-01 RX ADMIN — METOPROLOL TARTRATE 25 MG: 25 TABLET, FILM COATED ORAL at 04:33

## 2023-01-01 RX ADMIN — METOPROLOL TARTRATE 25 MG: 25 TABLET, FILM COATED ORAL at 11:32

## 2023-01-01 RX ADMIN — ATORVASTATIN CALCIUM 40 MG: 40 TABLET, FILM COATED ORAL at 09:10

## 2023-01-01 RX ADMIN — ACETAMINOPHEN 975 MG: 325 TABLET ORAL at 21:15

## 2023-01-01 RX ADMIN — METOPROLOL TARTRATE 25 MG: 25 TABLET, FILM COATED ORAL at 03:42

## 2023-01-01 RX ADMIN — MAGNESIUM OXIDE TAB 400 MG (241.3 MG ELEMENTAL MG) 800 MG: 400 (241.3 MG) TAB at 09:34

## 2023-01-01 RX ADMIN — APIXABAN 5 MG: 5 TABLET, FILM COATED ORAL at 14:40

## 2023-01-01 RX ADMIN — METOPROLOL TARTRATE 25 MG: 25 TABLET, FILM COATED ORAL at 11:53

## 2023-01-01 RX ADMIN — Medication: at 20:00

## 2023-01-01 RX ADMIN — ATORVASTATIN CALCIUM 40 MG: 40 TABLET, FILM COATED ORAL at 08:37

## 2023-01-01 RX ADMIN — TORSEMIDE 20 MG: 20 TABLET ORAL at 17:19

## 2023-01-01 RX ADMIN — MAGNESIUM OXIDE TAB 400 MG (241.3 MG ELEMENTAL MG) 800 MG: 400 (241.3 MG) TAB at 08:47

## 2023-01-01 RX ADMIN — APIXABAN 5 MG: 5 TABLET, FILM COATED ORAL at 13:50

## 2023-01-01 RX ADMIN — MAGNESIUM OXIDE TAB 400 MG (241.3 MG ELEMENTAL MG) 800 MG: 400 (241.3 MG) TAB at 09:10

## 2023-01-01 RX ADMIN — METOPROLOL TARTRATE 12.5 MG: 25 TABLET, FILM COATED ORAL at 18:19

## 2023-01-01 RX ADMIN — APIXABAN 5 MG: 5 TABLET, FILM COATED ORAL at 02:28

## 2023-01-01 RX ADMIN — PERFLUTREN 0.5 ML: 6.52 INJECTION, SUSPENSION INTRAVENOUS at 15:38

## 2023-01-01 RX ADMIN — ACETAMINOPHEN 975 MG: 325 TABLET ORAL at 02:23

## 2023-01-01 RX ADMIN — HEPARIN SODIUM 2000 UNITS/HR: 10000 INJECTION, SOLUTION INTRAVENOUS at 03:36

## 2023-01-01 RX ADMIN — ACETAMINOPHEN 650 MG: 325 TABLET, FILM COATED ORAL at 23:28

## 2023-01-01 RX ADMIN — METOPROLOL TARTRATE 25 MG: 25 TABLET, FILM COATED ORAL at 17:35

## 2023-01-01 RX ADMIN — ATORVASTATIN CALCIUM 40 MG: 40 TABLET, FILM COATED ORAL at 20:27

## 2023-01-01 RX ADMIN — METOLAZONE 5 MG: 5 TABLET ORAL at 13:09

## 2023-01-01 RX ADMIN — METOPROLOL TARTRATE 12.5 MG: 25 TABLET, FILM COATED ORAL at 12:30

## 2023-01-01 RX ADMIN — METOPROLOL TARTRATE 25 MG: 25 TABLET, FILM COATED ORAL at 22:25

## 2023-01-01 RX ADMIN — BUMETANIDE 3 MG: 0.25 INJECTION, SOLUTION INTRAMUSCULAR; INTRAVENOUS at 08:15

## 2023-01-01 RX ADMIN — APIXABAN 5 MG: 5 TABLET, FILM COATED ORAL at 15:45

## 2023-01-01 RX ADMIN — BUMETANIDE 1 MG/HR: 0.25 INJECTION, SOLUTION INTRAMUSCULAR; INTRAVENOUS at 18:21

## 2023-01-01 RX ADMIN — ATORVASTATIN CALCIUM 40 MG: 40 TABLET, FILM COATED ORAL at 09:34

## 2023-01-01 RX ADMIN — APIXABAN 5 MG: 5 TABLET, FILM COATED ORAL at 14:11

## 2023-01-01 RX ADMIN — METOPROLOL TARTRATE 25 MG: 25 TABLET, FILM COATED ORAL at 22:46

## 2023-01-01 RX ADMIN — APIXABAN 5 MG: 5 TABLET, FILM COATED ORAL at 02:00

## 2023-01-01 RX ADMIN — ACETAMINOPHEN 975 MG: 325 TABLET ORAL at 05:53

## 2023-01-01 RX ADMIN — METOPROLOL TARTRATE 12.5 MG: 25 TABLET, FILM COATED ORAL at 05:52

## 2023-01-01 RX ADMIN — BUMETANIDE 3 MG: 0.25 INJECTION INTRAMUSCULAR; INTRAVENOUS at 22:55

## 2023-01-01 RX ADMIN — METOPROLOL TARTRATE 12.5 MG: 25 TABLET, FILM COATED ORAL at 01:22

## 2023-01-01 RX ADMIN — METOPROLOL TARTRATE 25 MG: 25 TABLET, FILM COATED ORAL at 04:58

## 2023-01-01 RX ADMIN — TRAMADOL HYDROCHLORIDE 50 MG: 50 TABLET, COATED ORAL at 17:39

## 2023-01-01 RX ADMIN — ACETAMINOPHEN 975 MG: 325 TABLET ORAL at 16:47

## 2023-01-01 RX ADMIN — METOPROLOL TARTRATE 25 MG: 25 TABLET, FILM COATED ORAL at 17:27

## 2023-01-01 RX ADMIN — SENNOSIDES AND DOCUSATE SODIUM 2 TABLET: 50; 8.6 TABLET ORAL at 15:06

## 2023-01-01 RX ADMIN — APIXABAN 5 MG: 5 TABLET, FILM COATED ORAL at 01:26

## 2023-01-01 RX ADMIN — MAGNESIUM OXIDE TAB 400 MG (241.3 MG ELEMENTAL MG) 800 MG: 400 (241.3 MG) TAB at 10:28

## 2023-01-01 RX ADMIN — METOPROLOL TARTRATE 25 MG: 25 TABLET, FILM COATED ORAL at 04:17

## 2023-01-01 RX ADMIN — MAGNESIUM OXIDE TAB 400 MG (241.3 MG ELEMENTAL MG) 800 MG: 400 (241.3 MG) TAB at 10:48

## 2023-01-01 RX ADMIN — BUMETANIDE 4 MG: 0.25 INJECTION, SOLUTION INTRAMUSCULAR; INTRAVENOUS at 19:21

## 2023-01-01 RX ADMIN — METOPROLOL TARTRATE 25 MG: 25 TABLET, FILM COATED ORAL at 21:46

## 2023-01-01 RX ADMIN — APIXABAN 5 MG: 5 TABLET, FILM COATED ORAL at 05:34

## 2023-01-01 RX ADMIN — TORSEMIDE 20 MG: 20 TABLET ORAL at 17:00

## 2023-01-01 RX ADMIN — Medication: at 10:45

## 2023-01-01 RX ADMIN — MAGNESIUM OXIDE TAB 400 MG (241.3 MG ELEMENTAL MG) 800 MG: 400 (241.3 MG) TAB at 08:37

## 2023-01-01 RX ADMIN — ATORVASTATIN CALCIUM 40 MG: 40 TABLET, FILM COATED ORAL at 21:31

## 2023-01-01 RX ADMIN — ACETAMINOPHEN 975 MG: 325 TABLET ORAL at 01:05

## 2023-01-01 RX ADMIN — APIXABAN 5 MG: 5 TABLET, FILM COATED ORAL at 01:15

## 2023-01-01 RX ADMIN — METOPROLOL TARTRATE 25 MG: 25 TABLET, FILM COATED ORAL at 11:48

## 2023-01-01 RX ADMIN — APIXABAN 5 MG: 5 TABLET, FILM COATED ORAL at 02:55

## 2023-01-01 RX ADMIN — BUMETANIDE 3 MG/HR: 0.25 INJECTION, SOLUTION INTRAMUSCULAR; INTRAVENOUS at 05:06

## 2023-01-01 RX ADMIN — METOPROLOL TARTRATE 12.5 MG: 25 TABLET, FILM COATED ORAL at 05:47

## 2023-01-01 RX ADMIN — ACETAMINOPHEN 975 MG: 325 TABLET ORAL at 14:40

## 2023-01-01 RX ADMIN — MAGNESIUM SULFATE HEPTAHYDRATE 2 G: 40 INJECTION, SOLUTION INTRAVENOUS at 12:54

## 2023-01-01 RX ADMIN — ACETAMINOPHEN 975 MG: 325 TABLET ORAL at 05:47

## 2023-01-01 RX ADMIN — METOPROLOL TARTRATE 25 MG: 25 TABLET, FILM COATED ORAL at 10:28

## 2023-01-01 RX ADMIN — FUROSEMIDE 40 MG: 10 INJECTION, SOLUTION INTRAVENOUS at 08:00

## 2023-01-01 RX ADMIN — ATORVASTATIN CALCIUM 40 MG: 40 TABLET, FILM COATED ORAL at 10:19

## 2023-01-01 RX ADMIN — APIXABAN 5 MG: 5 TABLET, FILM COATED ORAL at 13:35

## 2023-01-01 RX ADMIN — APIXABAN 5 MG: 5 TABLET, FILM COATED ORAL at 16:55

## 2023-01-01 RX ADMIN — BUMETANIDE 4 MG: 0.25 INJECTION, SOLUTION INTRAMUSCULAR; INTRAVENOUS at 14:38

## 2023-01-01 RX ADMIN — ACETAMINOPHEN 975 MG: 325 TABLET ORAL at 10:47

## 2023-01-01 RX ADMIN — ACETAMINOPHEN 975 MG: 325 TABLET ORAL at 00:55

## 2023-01-01 RX ADMIN — METOPROLOL TARTRATE 25 MG: 25 TABLET, FILM COATED ORAL at 22:33

## 2023-01-01 RX ADMIN — METOPROLOL TARTRATE 12.5 MG: 25 TABLET, FILM COATED ORAL at 06:43

## 2023-01-01 RX ADMIN — APIXABAN 5 MG: 5 TABLET, FILM COATED ORAL at 01:34

## 2023-01-01 RX ADMIN — BUMETANIDE 2 MG/HR: 0.25 INJECTION, SOLUTION INTRAMUSCULAR; INTRAVENOUS at 08:18

## 2023-01-01 RX ADMIN — MAGNESIUM OXIDE TAB 400 MG (241.3 MG ELEMENTAL MG) 800 MG: 400 (241.3 MG) TAB at 08:27

## 2023-01-01 RX ADMIN — METOPROLOL TARTRATE 12.5 MG: 25 TABLET, FILM COATED ORAL at 01:26

## 2023-01-01 RX ADMIN — METOPROLOL TARTRATE 25 MG: 25 TABLET, FILM COATED ORAL at 22:17

## 2023-01-01 RX ADMIN — ACETAMINOPHEN 975 MG: 325 TABLET ORAL at 02:28

## 2023-01-01 RX ADMIN — ACETAMINOPHEN 975 MG: 325 TABLET ORAL at 17:19

## 2023-01-01 RX ADMIN — APIXABAN 5 MG: 5 TABLET, FILM COATED ORAL at 02:24

## 2023-01-01 RX ADMIN — BUMETANIDE 2 MG/HR: 0.25 INJECTION, SOLUTION INTRAMUSCULAR; INTRAVENOUS at 11:26

## 2023-01-01 RX ADMIN — FLUDEOXYGLUCOSE F 18 11.49 MILLICURIE: 200 INJECTION, SOLUTION INTRAVENOUS at 08:53

## 2023-01-01 RX ADMIN — APIXABAN 5 MG: 5 TABLET, FILM COATED ORAL at 15:51

## 2023-01-01 RX ADMIN — METOPROLOL TARTRATE 25 MG: 25 TABLET, FILM COATED ORAL at 17:19

## 2023-01-01 RX ADMIN — APIXABAN 5 MG: 5 TABLET, FILM COATED ORAL at 01:42

## 2023-01-01 RX ADMIN — ACETAMINOPHEN 975 MG: 325 TABLET ORAL at 01:42

## 2023-01-01 RX ADMIN — METOPROLOL TARTRATE 25 MG: 25 TABLET, FILM COATED ORAL at 10:19

## 2023-01-01 RX ADMIN — SODIUM CHLORIDE, SODIUM LACTATE, POTASSIUM CHLORIDE, CALCIUM CHLORIDE AND DEXTROSE MONOHYDRATE 2000 ML: 5; 600; 310; 30; 20 INJECTION, SOLUTION INTRAVENOUS at 12:51

## 2023-01-01 RX ADMIN — POLYETHYLENE GLYCOL 3350 17 G: 17 POWDER, FOR SOLUTION ORAL at 09:42

## 2023-01-01 RX ADMIN — ACETAMINOPHEN 975 MG: 325 TABLET ORAL at 09:40

## 2023-01-01 RX ADMIN — ATORVASTATIN CALCIUM 40 MG: 40 TABLET, FILM COATED ORAL at 11:48

## 2023-01-01 RX ADMIN — BUMETANIDE 3 MG: 0.25 INJECTION, SOLUTION INTRAMUSCULAR; INTRAVENOUS at 17:43

## 2023-01-01 RX ADMIN — BUMETANIDE 3 MG: 0.25 INJECTION INTRAMUSCULAR; INTRAVENOUS at 18:58

## 2023-01-01 RX ADMIN — CALCIUM CARBONATE (ANTACID) CHEW TAB 500 MG 500 MG: 500 CHEW TAB at 19:24

## 2023-01-01 RX ADMIN — BUMETANIDE 4 MG: 0.25 INJECTION, SOLUTION INTRAMUSCULAR; INTRAVENOUS at 12:08

## 2023-01-01 RX ADMIN — APIXABAN 5 MG: 5 TABLET, FILM COATED ORAL at 14:26

## 2023-01-01 RX ADMIN — ATORVASTATIN CALCIUM 40 MG: 40 TABLET, FILM COATED ORAL at 10:28

## 2023-01-01 RX ADMIN — BUMETANIDE 2 MG: 0.25 INJECTION INTRAMUSCULAR; INTRAVENOUS at 12:53

## 2023-01-01 RX ADMIN — APIXABAN 5 MG: 5 TABLET, FILM COATED ORAL at 14:12

## 2023-01-01 RX ADMIN — ACETAMINOPHEN 975 MG: 325 TABLET ORAL at 06:18

## 2023-01-01 RX ADMIN — METOPROLOL TARTRATE 25 MG: 25 TABLET, FILM COATED ORAL at 10:47

## 2023-01-01 RX ADMIN — FUROSEMIDE 120 MG: 10 INJECTION, SOLUTION INTRAVENOUS at 18:13

## 2023-01-01 RX ADMIN — APIXABAN 5 MG: 5 TABLET, FILM COATED ORAL at 16:35

## 2023-01-01 RX ADMIN — ACETAMINOPHEN 975 MG: 325 TABLET ORAL at 15:07

## 2023-01-01 RX ADMIN — ACETAMINOPHEN 975 MG: 325 TABLET ORAL at 14:42

## 2023-01-01 RX ADMIN — BUMETANIDE 3 MG/HR: 0.25 INJECTION, SOLUTION INTRAMUSCULAR; INTRAVENOUS at 20:26

## 2023-01-01 RX ADMIN — METOPROLOL TARTRATE 25 MG: 25 TABLET, FILM COATED ORAL at 16:45

## 2023-01-01 RX ADMIN — ACETAMINOPHEN 650 MG: 325 TABLET, FILM COATED ORAL at 12:03

## 2023-01-01 RX ADMIN — ACETAMINOPHEN 975 MG: 325 TABLET ORAL at 14:25

## 2023-01-01 RX ADMIN — SODIUM CHLORIDE, POTASSIUM CHLORIDE, SODIUM LACTATE AND CALCIUM CHLORIDE 500 ML: 600; 310; 30; 20 INJECTION, SOLUTION INTRAVENOUS at 17:53

## 2023-01-01 RX ADMIN — METOPROLOL TARTRATE 25 MG: 25 TABLET, FILM COATED ORAL at 16:47

## 2023-01-01 RX ADMIN — METOPROLOL TARTRATE 25 MG: 25 TABLET, FILM COATED ORAL at 12:07

## 2023-01-01 SDOH — SOCIAL STABILITY: SOCIAL NETWORK
DO YOU BELONG TO ANY CLUBS OR ORGANIZATIONS SUCH AS CHURCH GROUPS UNIONS, FRATERNAL OR ATHLETIC GROUPS, OR SCHOOL GROUPS?: PATIENT DECLINED

## 2023-01-01 SDOH — ECONOMIC STABILITY: TRANSPORTATION INSECURITY
IN THE PAST 12 MONTHS, HAS THE LACK OF TRANSPORTATION KEPT YOU FROM MEDICAL APPOINTMENTS OR FROM GETTING MEDICATIONS?: NO

## 2023-01-01 SDOH — SOCIAL STABILITY: SOCIAL INSECURITY: ARE THERE ANY APPARENT SIGNS OF INJURIES/BEHAVIORS THAT COULD BE RELATED TO ABUSE/NEGLECT?: NO

## 2023-01-01 SDOH — SOCIAL STABILITY: SOCIAL INSECURITY: DO YOU FEEL UNSAFE GOING BACK TO THE PLACE WHERE YOU ARE LIVING?: NO

## 2023-01-01 SDOH — HEALTH STABILITY: MENTAL HEALTH
STRESS IS WHEN SOMEONE FEELS TENSE, NERVOUS, ANXIOUS, OR CAN'T SLEEP AT NIGHT BECAUSE THEIR MIND IS TROUBLED. HOW STRESSED ARE YOU?: NOT AT ALL

## 2023-01-01 SDOH — HEALTH STABILITY: MENTAL HEALTH: HOW OFTEN DO YOU HAVE 6 OR MORE DRINKS ON ONE OCCASION?: NEVER

## 2023-01-01 SDOH — HEALTH STABILITY: MENTAL HEALTH: HOW OFTEN DO YOU HAVE A DRINK CONTAINING ALCOHOL?: NEVER

## 2023-01-01 SDOH — ECONOMIC STABILITY: FOOD INSECURITY: WITHIN THE PAST 12 MONTHS, YOU WORRIED THAT YOUR FOOD WOULD RUN OUT BEFORE YOU GOT MONEY TO BUY MORE.: NEVER TRUE

## 2023-01-01 SDOH — SOCIAL STABILITY: SOCIAL INSECURITY: DOES ANYONE TRY TO KEEP YOU FROM HAVING/CONTACTING OTHER FRIENDS OR DOING THINGS OUTSIDE YOUR HOME?: NO

## 2023-01-01 SDOH — SOCIAL STABILITY: SOCIAL INSECURITY: WITHIN THE LAST YEAR, HAVE YOU BEEN AFRAID OF YOUR PARTNER OR EX-PARTNER?: NO

## 2023-01-01 SDOH — SOCIAL STABILITY: SOCIAL NETWORK: HOW OFTEN DO YOU GET TOGETHER WITH FRIENDS OR RELATIVES?: PATIENT DECLINED

## 2023-01-01 SDOH — ECONOMIC STABILITY: HOUSING INSECURITY
IN THE LAST 12 MONTHS, WAS THERE A TIME WHEN YOU DID NOT HAVE A STEADY PLACE TO SLEEP OR SLEPT IN A SHELTER (INCLUDING NOW)?: NO

## 2023-01-01 SDOH — SOCIAL STABILITY: SOCIAL INSECURITY
WITHIN THE LAST YEAR, HAVE YOU BEEN KICKED, HIT, SLAPPED, OR OTHERWISE PHYSICALLY HURT BY YOUR PARTNER OR EX-PARTNER?: NO

## 2023-01-01 SDOH — SOCIAL STABILITY: SOCIAL INSECURITY: HAVE YOU HAD THOUGHTS OF HARMING ANYONE ELSE?: NO

## 2023-01-01 SDOH — SOCIAL STABILITY: SOCIAL INSECURITY
WITHIN THE LAST YEAR, HAVE TO BEEN RAPED OR FORCED TO HAVE ANY KIND OF SEXUAL ACTIVITY BY YOUR PARTNER OR EX-PARTNER?: NO

## 2023-01-01 SDOH — ECONOMIC STABILITY: TRANSPORTATION INSECURITY
IN THE PAST 12 MONTHS, HAS LACK OF TRANSPORTATION KEPT YOU FROM MEETINGS, WORK, OR FROM GETTING THINGS NEEDED FOR DAILY LIVING?: NO

## 2023-01-01 SDOH — SOCIAL STABILITY: SOCIAL INSECURITY: DO YOU FEEL ANYONE HAS EXPLOITED OR TAKEN ADVANTAGE OF YOU FINANCIALLY OR OF YOUR PERSONAL PROPERTY?: NO

## 2023-01-01 SDOH — SOCIAL STABILITY: SOCIAL INSECURITY: ARE YOU OR HAVE YOU BEEN THREATENED OR ABUSED PHYSICALLY, EMOTIONALLY, OR SEXUALLY BY ANYONE?: NO

## 2023-01-01 SDOH — HEALTH STABILITY: PHYSICAL HEALTH: ON AVERAGE, HOW MANY MINUTES DO YOU ENGAGE IN EXERCISE AT THIS LEVEL?: 0 MIN

## 2023-01-01 SDOH — ECONOMIC STABILITY: INCOME INSECURITY: HOW HARD IS IT FOR YOU TO PAY FOR THE VERY BASICS LIKE FOOD, HOUSING, MEDICAL CARE, AND HEATING?: NOT HARD AT ALL

## 2023-01-01 SDOH — SOCIAL STABILITY: SOCIAL NETWORK: ARE YOU MARRIED, WIDOWED, DIVORCED, SEPARATED, NEVER MARRIED, OR LIVING WITH A PARTNER?: MARRIED

## 2023-01-01 SDOH — ECONOMIC STABILITY: INCOME INSECURITY: IN THE PAST 12 MONTHS, HAS THE ELECTRIC, GAS, OIL, OR WATER COMPANY THREATENED TO SHUT OFF SERVICE IN YOUR HOME?: NO

## 2023-01-01 SDOH — SOCIAL STABILITY: SOCIAL INSECURITY: ABUSE: ADULT

## 2023-01-01 SDOH — ECONOMIC STABILITY: INCOME INSECURITY: IN THE LAST 12 MONTHS, WAS THERE A TIME WHEN YOU WERE NOT ABLE TO PAY THE MORTGAGE OR RENT ON TIME?: NO

## 2023-01-01 SDOH — ECONOMIC STABILITY: FOOD INSECURITY: WITHIN THE PAST 12 MONTHS, THE FOOD YOU BOUGHT JUST DIDN'T LAST AND YOU DIDN'T HAVE MONEY TO GET MORE.: NEVER TRUE

## 2023-01-01 SDOH — HEALTH STABILITY: MENTAL HEALTH: HOW MANY STANDARD DRINKS CONTAINING ALCOHOL DO YOU HAVE ON A TYPICAL DAY?: PATIENT DOES NOT DRINK

## 2023-01-01 SDOH — SOCIAL STABILITY: SOCIAL INSECURITY: HAS ANYONE EVER THREATENED TO HURT YOUR FAMILY OR YOUR PETS?: NO

## 2023-01-01 SDOH — SOCIAL STABILITY: SOCIAL NETWORK: HOW OFTEN DO YOU ATTEND CHURCH OR RELIGIOUS SERVICES?: PATIENT DECLINED

## 2023-01-01 SDOH — SOCIAL STABILITY: SOCIAL NETWORK: IN A TYPICAL WEEK, HOW MANY TIMES DO YOU TALK ON THE PHONE WITH FAMILY, FRIENDS, OR NEIGHBORS?: PATIENT DECLINED

## 2023-01-01 SDOH — SOCIAL STABILITY: SOCIAL NETWORK: HOW OFTEN DO YOU ATTENT MEETINGS OF THE CLUB OR ORGANIZATION YOU BELONG TO?: PATIENT DECLINED

## 2023-01-01 SDOH — ECONOMIC STABILITY: HOUSING INSECURITY: IN THE LAST 12 MONTHS, HOW MANY PLACES HAVE YOU LIVED?: 1

## 2023-01-01 SDOH — SOCIAL STABILITY: SOCIAL INSECURITY: WERE YOU ABLE TO COMPLETE ALL THE BEHAVIORAL HEALTH SCREENINGS?: YES

## 2023-01-01 SDOH — SOCIAL STABILITY: SOCIAL INSECURITY: WITHIN THE LAST YEAR, HAVE YOU BEEN HUMILIATED OR EMOTIONALLY ABUSED IN OTHER WAYS BY YOUR PARTNER OR EX-PARTNER?: NO

## 2023-01-01 SDOH — HEALTH STABILITY: PHYSICAL HEALTH: ON AVERAGE, HOW MANY DAYS PER WEEK DO YOU ENGAGE IN MODERATE TO STRENUOUS EXERCISE (LIKE A BRISK WALK)?: 0 DAYS

## 2023-01-01 ASSESSMENT — PATIENT HEALTH QUESTIONNAIRE - PHQ9
1. LITTLE INTEREST OR PLEASURE IN DOING THINGS: NOT AT ALL
2. FEELING DOWN, DEPRESSED OR HOPELESS: NOT AT ALL
1. LITTLE INTEREST OR PLEASURE IN DOING THINGS: NOT AT ALL
SUM OF ALL RESPONSES TO PHQ9 QUESTIONS 1 & 2: 0
2. FEELING DOWN, DEPRESSED OR HOPELESS: NOT AT ALL
SUM OF ALL RESPONSES TO PHQ9 QUESTIONS 1 & 2: 0

## 2023-01-01 ASSESSMENT — COGNITIVE AND FUNCTIONAL STATUS - GENERAL
PERSONAL GROOMING: A LITTLE
DRESSING REGULAR LOWER BODY CLOTHING: A LITTLE
CLIMB 3 TO 5 STEPS WITH RAILING: TOTAL
WALKING IN HOSPITAL ROOM: A LITTLE
MOVING TO AND FROM BED TO CHAIR: A LITTLE
TOILETING: A LITTLE
HELP NEEDED FOR BATHING: A LOT
DRESSING REGULAR UPPER BODY CLOTHING: A LITTLE
STANDING UP FROM CHAIR USING ARMS: A LITTLE
WALKING IN HOSPITAL ROOM: A LITTLE
MOBILITY SCORE: 24
MOBILITY SCORE: 18
DAILY ACTIVITIY SCORE: 22
PATIENT BASELINE BEDBOUND: NO
MOBILITY SCORE: 21
HELP NEEDED FOR BATHING: TOTAL
STANDING UP FROM CHAIR USING ARMS: A LITTLE
WALKING IN HOSPITAL ROOM: A LITTLE
TOILETING: A LITTLE
MOBILITY SCORE: 21
DRESSING REGULAR LOWER BODY CLOTHING: A LITTLE
MOVING TO AND FROM BED TO CHAIR: A LITTLE
MOVING TO AND FROM BED TO CHAIR: A LITTLE
DAILY ACTIVITIY SCORE: 20
MOVING TO AND FROM BED TO CHAIR: A LITTLE
CLIMB 3 TO 5 STEPS WITH RAILING: A LOT
MOBILITY SCORE: 19
HELP NEEDED FOR BATHING: A LOT
TURNING FROM BACK TO SIDE WHILE IN FLAT BAD: A LITTLE
WALKING IN HOSPITAL ROOM: A LITTLE
DRESSING REGULAR UPPER BODY CLOTHING: A LOT
MOVING TO AND FROM BED TO CHAIR: A LITTLE
DRESSING REGULAR LOWER BODY CLOTHING: A LITTLE
DRESSING REGULAR UPPER BODY CLOTHING: A LITTLE
CLIMB 3 TO 5 STEPS WITH RAILING: A LOT
MOBILITY SCORE: 19
DRESSING REGULAR UPPER BODY CLOTHING: A LOT
MOVING TO AND FROM BED TO CHAIR: A LITTLE
DAILY ACTIVITIY SCORE: 22
TURNING FROM BACK TO SIDE WHILE IN FLAT BAD: A LOT
MOBILITY SCORE: 17
DRESSING REGULAR LOWER BODY CLOTHING: A LOT
CLIMB 3 TO 5 STEPS WITH RAILING: A LOT
DRESSING REGULAR UPPER BODY CLOTHING: TOTAL
TURNING FROM BACK TO SIDE WHILE IN FLAT BAD: A LITTLE
TURNING FROM BACK TO SIDE WHILE IN FLAT BAD: A LITTLE
CLIMB 3 TO 5 STEPS WITH RAILING: A LOT
TOILETING: A LITTLE
MOVING TO AND FROM BED TO CHAIR: A LITTLE
DRESSING REGULAR LOWER BODY CLOTHING: A LITTLE
MOVING TO AND FROM BED TO CHAIR: A LITTLE
DAILY ACTIVITIY SCORE: 15
STANDING UP FROM CHAIR USING ARMS: A LITTLE
WALKING IN HOSPITAL ROOM: A LITTLE
STANDING UP FROM CHAIR USING ARMS: A LOT
DRESSING REGULAR UPPER BODY CLOTHING: A LITTLE
MOBILITY SCORE: 18
MOBILITY SCORE: 18
TOILETING: A LOT
DRESSING REGULAR UPPER BODY CLOTHING: A LITTLE
CLIMB 3 TO 5 STEPS WITH RAILING: A LITTLE
MOVING TO AND FROM BED TO CHAIR: A LITTLE
WALKING IN HOSPITAL ROOM: A LOT
STANDING UP FROM CHAIR USING ARMS: A LITTLE
DAILY ACTIVITIY SCORE: 19
WALKING IN HOSPITAL ROOM: A LITTLE
MOVING FROM LYING ON BACK TO SITTING ON SIDE OF FLAT BED WITH BEDRAILS: A LITTLE
DAILY ACTIVITIY SCORE: 20
TOILETING: A LITTLE
MOVING TO AND FROM BED TO CHAIR: A LITTLE
MOVING TO AND FROM BED TO CHAIR: A LOT
STANDING UP FROM CHAIR USING ARMS: A LITTLE
DRESSING REGULAR LOWER BODY CLOTHING: A LOT
TURNING FROM BACK TO SIDE WHILE IN FLAT BAD: A LITTLE
CLIMB 3 TO 5 STEPS WITH RAILING: TOTAL
STANDING UP FROM CHAIR USING ARMS: A LITTLE
DRESSING REGULAR LOWER BODY CLOTHING: A LITTLE
CLIMB 3 TO 5 STEPS WITH RAILING: A LITTLE
TURNING FROM BACK TO SIDE WHILE IN FLAT BAD: A LITTLE
DAILY ACTIVITIY SCORE: 19
TURNING FROM BACK TO SIDE WHILE IN FLAT BAD: A LITTLE
MOVING FROM LYING ON BACK TO SITTING ON SIDE OF FLAT BED WITH BEDRAILS: A LITTLE
PERSONAL GROOMING: A LITTLE
MOBILITY SCORE: 23
DAILY ACTIVITIY SCORE: 7
MOBILITY SCORE: 18
TURNING FROM BACK TO SIDE WHILE IN FLAT BAD: A LITTLE
CLIMB 3 TO 5 STEPS WITH RAILING: A LOT
CLIMB 3 TO 5 STEPS WITH RAILING: A LOT
HELP NEEDED FOR BATHING: A LITTLE
STANDING UP FROM CHAIR USING ARMS: A LOT
DRESSING REGULAR UPPER BODY CLOTHING: A LITTLE
PERSONAL GROOMING: A LOT
DAILY ACTIVITIY SCORE: 20
STANDING UP FROM CHAIR USING ARMS: A LITTLE
DRESSING REGULAR LOWER BODY CLOTHING: A LOT
WALKING IN HOSPITAL ROOM: A LITTLE
CLIMB 3 TO 5 STEPS WITH RAILING: A LITTLE
MOBILITY SCORE: 17
WALKING IN HOSPITAL ROOM: A LOT
MOBILITY SCORE: 15
MOVING FROM LYING ON BACK TO SITTING ON SIDE OF FLAT BED WITH BEDRAILS: A LITTLE
DAILY ACTIVITIY SCORE: 22
WALKING IN HOSPITAL ROOM: A LITTLE
TOILETING: A LITTLE
MOBILITY SCORE: 21
DAILY ACTIVITIY SCORE: 22
STANDING UP FROM CHAIR USING ARMS: A LITTLE
WALKING IN HOSPITAL ROOM: A LITTLE
MOVING TO AND FROM BED TO CHAIR: A LITTLE
HELP NEEDED FOR BATHING: A LITTLE
MOBILITY SCORE: 18
STANDING UP FROM CHAIR USING ARMS: A LITTLE
DAILY ACTIVITIY SCORE: 18
CLIMB 3 TO 5 STEPS WITH RAILING: A LOT
PERSONAL GROOMING: A LITTLE
TOILETING: A LITTLE
DAILY ACTIVITIY SCORE: 19
PERSONAL GROOMING: A LOT
MOBILITY SCORE: 19
DRESSING REGULAR UPPER BODY CLOTHING: A LITTLE
DRESSING REGULAR LOWER BODY CLOTHING: A LITTLE
STANDING UP FROM CHAIR USING ARMS: A LITTLE
WALKING IN HOSPITAL ROOM: A LITTLE
PERSONAL GROOMING: TOTAL
PERSONAL GROOMING: A LITTLE
TOILETING: A LOT
MOBILITY SCORE: 19
DRESSING REGULAR UPPER BODY CLOTHING: A LOT
DAILY ACTIVITIY SCORE: 24
WALKING IN HOSPITAL ROOM: A LITTLE
STANDING UP FROM CHAIR USING ARMS: A LITTLE
HELP NEEDED FOR BATHING: A LOT
CLIMB 3 TO 5 STEPS WITH RAILING: A LOT
MOBILITY SCORE: 19
CLIMB 3 TO 5 STEPS WITH RAILING: A LOT
TOILETING: A LOT
CLIMB 3 TO 5 STEPS WITH RAILING: TOTAL
DRESSING REGULAR UPPER BODY CLOTHING: A LITTLE
HELP NEEDED FOR BATHING: A LOT
DRESSING REGULAR UPPER BODY CLOTHING: A LITTLE
DRESSING REGULAR LOWER BODY CLOTHING: A LITTLE
TOILETING: A LITTLE
MOBILITY SCORE: 19
MOBILITY SCORE: 24
DAILY ACTIVITIY SCORE: 24
DAILY ACTIVITIY SCORE: 14
MOBILITY SCORE: 24
DAILY ACTIVITIY SCORE: 24
MOBILITY SCORE: 16
PERSONAL GROOMING: A LOT
DAILY ACTIVITIY SCORE: 24
DAILY ACTIVITIY SCORE: 24
HELP NEEDED FOR BATHING: A LITTLE
MOVING TO AND FROM BED TO CHAIR: A LITTLE
TOILETING: A LITTLE
PERSONAL GROOMING: A LITTLE
CLIMB 3 TO 5 STEPS WITH RAILING: A LOT
WALKING IN HOSPITAL ROOM: A LITTLE
STANDING UP FROM CHAIR USING ARMS: A LITTLE
CLIMB 3 TO 5 STEPS WITH RAILING: A LOT
DRESSING REGULAR UPPER BODY CLOTHING: A LITTLE
TOILETING: A LITTLE
DRESSING REGULAR LOWER BODY CLOTHING: A LITTLE
DRESSING REGULAR LOWER BODY CLOTHING: A LOT
DRESSING REGULAR LOWER BODY CLOTHING: A LOT
MOVING TO AND FROM BED TO CHAIR: A LITTLE
CLIMB 3 TO 5 STEPS WITH RAILING: A LOT
CLIMB 3 TO 5 STEPS WITH RAILING: A LOT
TURNING FROM BACK TO SIDE WHILE IN FLAT BAD: A LITTLE
EATING MEALS: TOTAL
CLIMB 3 TO 5 STEPS WITH RAILING: A LOT
TOILETING: A LITTLE
DAILY ACTIVITIY SCORE: 19
DRESSING REGULAR LOWER BODY CLOTHING: A LITTLE
DRESSING REGULAR LOWER BODY CLOTHING: A LOT
DAILY ACTIVITIY SCORE: 19
MOBILITY SCORE: 24
MOVING TO AND FROM BED TO CHAIR: A LITTLE
HELP NEEDED FOR BATHING: A LITTLE
MOBILITY SCORE: 19
DRESSING REGULAR UPPER BODY CLOTHING: A LITTLE
WALKING IN HOSPITAL ROOM: A LITTLE
HELP NEEDED FOR BATHING: A LITTLE
TOILETING: A LITTLE
MOVING TO AND FROM BED TO CHAIR: A LITTLE
PERSONAL GROOMING: A LITTLE
TURNING FROM BACK TO SIDE WHILE IN FLAT BAD: A LITTLE
STANDING UP FROM CHAIR USING ARMS: A LITTLE
DAILY ACTIVITIY SCORE: 17
DRESSING REGULAR LOWER BODY CLOTHING: A LITTLE
TOILETING: A LITTLE
TURNING FROM BACK TO SIDE WHILE IN FLAT BAD: A LITTLE
DRESSING REGULAR LOWER BODY CLOTHING: A LITTLE
CLIMB 3 TO 5 STEPS WITH RAILING: A LOT
STANDING UP FROM CHAIR USING ARMS: A LOT
WALKING IN HOSPITAL ROOM: A LITTLE
TOILETING: TOTAL
DRESSING REGULAR UPPER BODY CLOTHING: A LITTLE
TOILETING: A LITTLE
DAILY ACTIVITIY SCORE: 18
DRESSING REGULAR UPPER BODY CLOTHING: A LITTLE
EATING MEALS: A LITTLE
HELP NEEDED FOR BATHING: A LITTLE
CLIMB 3 TO 5 STEPS WITH RAILING: A LOT
DRESSING REGULAR UPPER BODY CLOTHING: A LITTLE
MOBILITY SCORE: 15
WALKING IN HOSPITAL ROOM: A LOT
MOBILITY SCORE: 19
STANDING UP FROM CHAIR USING ARMS: A LITTLE
DAILY ACTIVITIY SCORE: 14
STANDING UP FROM CHAIR USING ARMS: A LITTLE
HELP NEEDED FOR BATHING: A LITTLE
WALKING IN HOSPITAL ROOM: A LITTLE
MOVING FROM LYING ON BACK TO SITTING ON SIDE OF FLAT BED WITH BEDRAILS: A LITTLE
MOBILITY SCORE: 19
CLIMB 3 TO 5 STEPS WITH RAILING: A LITTLE
STANDING UP FROM CHAIR USING ARMS: A LITTLE
MOVING TO AND FROM BED TO CHAIR: A LITTLE
CLIMB 3 TO 5 STEPS WITH RAILING: A LOT
MOBILITY SCORE: 19
HELP NEEDED FOR BATHING: A LITTLE
HELP NEEDED FOR BATHING: A LITTLE
WALKING IN HOSPITAL ROOM: A LITTLE
HELP NEEDED FOR BATHING: A LOT
TOILETING: A LITTLE
CLIMB 3 TO 5 STEPS WITH RAILING: A LOT
MOBILITY SCORE: 21
DAILY ACTIVITIY SCORE: 22
WALKING IN HOSPITAL ROOM: A LOT

## 2023-01-01 ASSESSMENT — PAIN SCALES - GENERAL
PAINLEVEL_OUTOF10: 0 - NO PAIN
PAINLEVEL_OUTOF10: 5 - MODERATE PAIN
PAINLEVEL_OUTOF10: 0 - NO PAIN
PAINLEVEL_OUTOF10: 4
PAINLEVEL_OUTOF10: 6
PAINLEVEL_OUTOF10: 1
PAINLEVEL_OUTOF10: 0 - NO PAIN
PAINLEVEL_OUTOF10: 0 - NO PAIN
PAINLEVEL_OUTOF10: 1
PAINLEVEL_OUTOF10: 0 - NO PAIN
PAINLEVEL_OUTOF10: 4
PAINLEVEL_OUTOF10: 0 - NO PAIN
PAINLEVEL_OUTOF10: 4
PAINLEVEL_OUTOF10: 6
PAINLEVEL_OUTOF10: 5 - MODERATE PAIN
PAINLEVEL_OUTOF10: 5 - MODERATE PAIN
PAINLEVEL_OUTOF10: 0 - NO PAIN
PAINLEVEL_OUTOF10: 3
PAINLEVEL_OUTOF10: 4
PAINLEVEL_OUTOF10: 0 - NO PAIN
PAINLEVEL_OUTOF10: 7
PAINLEVEL_OUTOF10: 0 - NO PAIN
PAINLEVEL_OUTOF10: 3
PAINLEVEL_OUTOF10: 0 - NO PAIN
PAINLEVEL_OUTOF10: 6
PAINLEVEL_OUTOF10: 7
PAINLEVEL_OUTOF10: 5 - MODERATE PAIN
PAINLEVEL_OUTOF10: 4
PAINLEVEL_OUTOF10: 0 - NO PAIN
PAINLEVEL_OUTOF10: 0 - NO PAIN
PAINLEVEL_OUTOF10: 5 - MODERATE PAIN
PAINLEVEL_OUTOF10: 0 - NO PAIN
PAINLEVEL_OUTOF10: 7
PAINLEVEL_OUTOF10: 0 - NO PAIN
PAINLEVEL_OUTOF10: 0 - NO PAIN
PAINLEVEL_OUTOF10: 5 - MODERATE PAIN
PAINLEVEL_OUTOF10: 3
PAINLEVEL: 7
PAINLEVEL_OUTOF10: 4
PAINLEVEL_OUTOF10: 6
PAINLEVEL_OUTOF10: 0 - NO PAIN
PAINLEVEL_OUTOF10: 0 - NO PAIN

## 2023-01-01 ASSESSMENT — PAIN - FUNCTIONAL ASSESSMENT

## 2023-01-01 ASSESSMENT — LIFESTYLE VARIABLES
PRESCIPTION_ABUSE_PAST_12_MONTHS: NO
SKIP TO QUESTIONS 9-10: 1
HOW OFTEN DO YOU HAVE A DRINK CONTAINING ALCOHOL: NEVER
AUDIT-C TOTAL SCORE: 0
SUBSTANCE_ABUSE_PAST_12_MONTHS: NO
HOW MANY STANDARD DRINKS CONTAINING ALCOHOL DO YOU HAVE ON A TYPICAL DAY: PATIENT DOES NOT DRINK
AUDIT-C TOTAL SCORE: 0
HOW OFTEN DO YOU HAVE A DRINK CONTAINING ALCOHOL: NEVER
SUBSTANCE_ABUSE_PAST_12_MONTHS: NO
HOW OFTEN DO YOU HAVE 6 OR MORE DRINKS ON ONE OCCASION: NEVER
AUDIT-C TOTAL SCORE: 0
HOW OFTEN DO YOU HAVE 6 OR MORE DRINKS ON ONE OCCASION: NEVER
PRESCIPTION_ABUSE_PAST_12_MONTHS: NO
SKIP TO QUESTIONS 9-10: 1
HOW MANY STANDARD DRINKS CONTAINING ALCOHOL DO YOU HAVE ON A TYPICAL DAY: PATIENT DOES NOT DRINK
AUDIT-C TOTAL SCORE: 0
SKIP TO QUESTIONS 9-10: 1
AUDIT-C TOTAL SCORE: 0

## 2023-01-01 ASSESSMENT — ACTIVITIES OF DAILY LIVING (ADL)
JUDGMENT_ADEQUATE_SAFELY_COMPLETE_DAILY_ACTIVITIES: YES
HEARING - LEFT EAR: FUNCTIONAL
DRESSING YOURSELF: INDEPENDENT
GROOMING: INDEPENDENT
WALKS IN HOME: NEEDS ASSISTANCE
ADEQUATE_TO_COMPLETE_ADL: YES
TOILETING: INDEPENDENT
BATHING_ASSISTANCE: MAXIMAL
PATIENT'S MEMORY ADEQUATE TO SAFELY COMPLETE DAILY ACTIVITIES?: YES
BATHING: INDEPENDENT
HEARING - RIGHT EAR: FUNCTIONAL
FEEDING YOURSELF: INDEPENDENT
ADL_ASSISTANCE: INDEPENDENT

## 2023-01-01 ASSESSMENT — COLUMBIA-SUICIDE SEVERITY RATING SCALE - C-SSRS
6. HAVE YOU EVER DONE ANYTHING, STARTED TO DO ANYTHING, OR PREPARED TO DO ANYTHING TO END YOUR LIFE?: NO
6. HAVE YOU EVER DONE ANYTHING, STARTED TO DO ANYTHING, OR PREPARED TO DO ANYTHING TO END YOUR LIFE?: NO
1. IN THE PAST MONTH, HAVE YOU WISHED YOU WERE DEAD OR WISHED YOU COULD GO TO SLEEP AND NOT WAKE UP?: NO
2. HAVE YOU ACTUALLY HAD ANY THOUGHTS OF KILLING YOURSELF?: NO
1. IN THE PAST MONTH, HAVE YOU WISHED YOU WERE DEAD OR WISHED YOU COULD GO TO SLEEP AND NOT WAKE UP?: NO
2. HAVE YOU ACTUALLY HAD ANY THOUGHTS OF KILLING YOURSELF?: NO

## 2023-01-01 ASSESSMENT — PAIN SCALES - WONG BAKER
WONGBAKER_NUMERICALRESPONSE: NO HURT
WONGBAKER_NUMERICALRESPONSE: NO HURT

## 2023-02-25 PROBLEM — J45.909 REACTIVE AIRWAY DISEASE (HHS-HCC): Status: ACTIVE | Noted: 2023-01-01

## 2023-02-25 PROBLEM — I26.99 PULMONARY EMBOLISM (MULTI): Status: ACTIVE | Noted: 2023-01-01

## 2023-02-25 PROBLEM — G57.92: Status: ACTIVE | Noted: 2023-01-01

## 2023-02-25 PROBLEM — I48.91 ATRIAL FIBRILLATION (MULTI): Status: ACTIVE | Noted: 2023-01-01

## 2023-02-25 PROBLEM — R04.2 HEMOPTYSIS: Status: ACTIVE | Noted: 2023-01-01

## 2023-02-25 PROBLEM — R06.2 WHEEZING: Status: ACTIVE | Noted: 2023-01-01

## 2023-02-25 PROBLEM — M17.0 PRIMARY OSTEOARTHRITIS OF KNEES, BILATERAL: Status: ACTIVE | Noted: 2023-01-01

## 2023-02-25 PROBLEM — M25.569 JOINT PAIN, KNEE: Status: ACTIVE | Noted: 2023-01-01

## 2023-02-25 PROBLEM — M79.673 FOOT PAIN: Status: ACTIVE | Noted: 2023-01-01

## 2023-02-25 PROBLEM — I10 BENIGN ESSENTIAL HYPERTENSION: Status: ACTIVE | Noted: 2023-01-01

## 2023-02-25 PROBLEM — I50.31 ACUTE DIASTOLIC HEART FAILURE WITH PRESERVED EJECTION FRACTION (MULTI): Status: ACTIVE | Noted: 2023-01-01

## 2023-02-25 PROBLEM — N43.3 HYDROCELE: Status: ACTIVE | Noted: 2023-01-01

## 2023-02-25 PROBLEM — E66.01 MORBID OBESITY (MULTI): Status: ACTIVE | Noted: 2023-01-01

## 2023-02-25 PROBLEM — G47.33 OSA (OBSTRUCTIVE SLEEP APNEA): Status: ACTIVE | Noted: 2023-01-01

## 2023-02-25 PROBLEM — E78.5 HLD (HYPERLIPIDEMIA): Status: ACTIVE | Noted: 2023-01-01

## 2023-02-25 PROBLEM — R60.9 EDEMA: Status: ACTIVE | Noted: 2023-01-01

## 2023-07-31 NOTE — PROGRESS NOTES
"Subjective   Patient ID: Michael Calderón is a 62 y.o. male who presents for Follow-up.    HPI    #HF exacerbation  - Patient with recent hospitalization for fluid overload with SOB, scrotal and peripheral extremity swelling  - reports improvement in SOB, scrotal swelling, and peripheral edema  - Echo 7/18/23 shows EF 60-65% moderate LVH , moderate pericardial effusion  - taking Torsemide 40mg PO BID as well as daily potassium 40meq PO.  - 7/28/23  RFP Cr 1.34. Electrolyes wnl    #Right Mouth pain  - numbness along bottom lip and right jaw . Persistent. Has yet to stop  -started 2 weeks ago around timing of using CPAP machine  - thought to be blocked salivary duct  - has tried to use lemon producrs to improve but no improvement  - bump in mouth  - mild erythema    #Left heel pain  #Left plantar wart hx  - started 7 years ago  - reports \"pulsing pain\"  - MRI 2016 reads:   Focal atrophy of the the abductor digiti minimi muscle without   significant edema.  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  2.  Plantar aponeurosis is intact and unremarkable.  3.  Significant bimalleolar soft tissue swelling  - Tried alleve, Tylenol, and gabapentin  - denies numbness, tingling    Review of Systems  As per hpi  Previous history  Past Medical History:   Diagnosis Date    Personal history of other venous thrombosis and embolism 07/26/2016    History of deep venous thrombosis    Plantar wart 02/19/2016    Plantar wart, left foot     No past surgical history on file.  Social History     Tobacco Use    Smoking status: Never    Smokeless tobacco: Never   Substance Use Topics    Alcohol use: Never    Drug use: Never     Family History   Problem Relation Name Age of Onset    Hypertension Father      Other (cardiac disorder) Father      Pulmonary embolism Brother       No Known Allergies  Current Outpatient Medications   Medication Instructions    " amLODIPine (Norvasc) 5 mg tablet 1 tablet, oral, Daily    apixaban (Eliquis) 5 mg tablet 1 tablet, oral, 2 times daily    atenolol (Tenormin) 100 mg tablet 1 tablet, oral, Daily    atorvastatin (Lipitor) 40 mg tablet 1 tablet, oral, Nightly    furosemide (Lasix) 40 mg tablet 1 tablet, oral, Daily    hydrALAZINE (Apresoline) 50 mg tablet 1 tablet, oral, 3 times daily    lisinopril 40 mg tablet 1 tablet, oral, Daily       Objective       Physical Exam  Constitutional:       General: He is not in acute distress.     Appearance: He is obese. He is not ill-appearing.   HENT:      Head: Normocephalic and atraumatic.   Eyes:      General: No scleral icterus.     Conjunctiva/sclera: Conjunctivae normal.   Cardiovascular:      Rate and Rhythm: Normal rate and regular rhythm.      Heart sounds: No murmur heard.     No friction rub. No gallop.   Pulmonary:      Effort: Pulmonary effort is normal. No respiratory distress.      Breath sounds: Normal breath sounds.   Abdominal:      General: There is no distension.      Palpations: Abdomen is soft.      Tenderness: There is no abdominal tenderness.   Musculoskeletal:         General: Normal range of motion.   Skin:     General: Skin is warm and dry.   Neurological:      General: No focal deficit present.      Mental Status: He is alert and oriented to person, place, and time.   Psychiatric:         Mood and Affect: Mood normal.       Assessment/Plan   Michael Calderón is a 62 y.o. male who presents for the concerns below:    Problem List Items Addressed This Visit    None  #HF exacerbation  #HTN  - c/w taking Torsemide 40mg PO BID as well as daily potassium 40meq PO.  -refill Amlopdine and Lisinopril    #Right Mouth pain  - Dentist referall    #Left heel pain  -Previous MRI notes possible [place] Neuropathy  -Podiatry referall    # Afib  - refill eliquis 5mg BID       Discussed with:  Dr. Garcia  Return in : 3 months    Portions of this note were generated using digital voice  recognition software, and may contain grammatical errors       Wally Petty, DO  PGY-2, Family Medicine

## 2023-08-04 NOTE — PROGRESS NOTES
I saw and evaluated the patient. I personally obtained the key and critical portions of the history and physical exam or was physically present for key and critical portions performed by the resident/fellow. I reviewed the resident/fellow's documentation and discussed the patient with the resident/fellow. I agree with the resident/fellow's medical decision making as documented in the note.    Gennaro Garcia MD

## 2023-08-11 NOTE — PROGRESS NOTES
Michael Calderón  Program: [facility] CHF Post-Discharge  MRN: [id number]  YOB: 1968    This patient had a RED on Fri, 11 Aug 2023 9:14:20 am EDT    Question(s) with flags that caused the Alert (up to last 5 values   recorded)    Question: Are you interested in this program?     Date:     2023-08-11     Color:    yellow     Answers:  Yes    Question: Is there a reason you haven't scheduled this appointment?     Date:     2023-08-11     Color:    red     Answers:  I don't have a specialist    Question: Do you have a post-discharge visit scheduled with your   specialist?     Date:     2023-08-11     Color:    red     Answers:  No

## 2023-08-11 NOTE — PROGRESS NOTES
08/11/2023 Patient stated he had a good follow up appointment, and he stated his chat had to do with a specialist. He states he does not have a specialist and his pcp assists him No further questions at this time esme

## 2023-09-05 NOTE — TELEPHONE ENCOUNTER
Pt states he needs a refill on Toresmide. I looked in his chart and did not see a script for that, but he says he got it from the ED.

## 2023-09-11 NOTE — TELEPHONE ENCOUNTER
Rx Refill Request Telephone Encounter    Name:  Michael Calderón  :  [id number]  Medication Name:  Torsemide   40 mg    2x/day      Specific Pharmacy location:  [facility]  Date of last appointment:  2023  Date of next appointment:  10/19/2023  Best number to reach patient:  [phone]        Patient states that while he was in the hospital back in July, the doctors treating him started him on Torsemide to replace Furosemide. Patient is looking to get a refill of Torsemide as he states this works better than Furosemide. Patient also states that the doctors taking care of him during his hospital stay were members of Family Medicine.

## 2023-10-03 NOTE — Clinical Note
Patient Clipped and Prepped: right neck. Prepped with ChloraPrep, a minimum of 3 minute dry time, longer if needed, no pooling noted, patient draped in sterile fashion.

## 2023-10-03 NOTE — ED PROVIDER NOTES
CC: Shortness of Breath (H/O CHF, sob 1 week and swelling )     History provided by: Patient  Limitations to History: History Limitations: None    HPI:  62-year-old male with history of hypertension, A-fib, CHF, hyperlipidemia presenting to the emergency department shortness of breath.  Reports it has been going on for approximately 1 week, started with a nonproductive cough 1 week ago, with associated orthopnea, paroxysmal dyspnea, dyspnea on exertion.  Reports gaining weight, feeling distended in his abdomen, feeling more fluid in his legs and abdomen.  Has been taking 40 mg of torsemide twice daily without any relief.  Was taking this prior to when symptoms started.  Denies any fevers, chills, chest pain, abdominal pain, nausea, vomiting.    External Records Reviewed: Reviewed discharge summary from July 2023, reviewed echocardiogram from July 2023 with preserved ejection fraction.  ???????????????????????????????????????????????????????????????  Triage Vitals:  T 36.3 °C (97.3 °F)  HR (!) 135  BP 93/61  RR 18  O2 96 %      Vital signs reviewed in nursing triage note, EMR flow sheets, and at patient's bedside.   General: Awake, alert, in no acute distress, obese  Eyes: Gaze conjugate.  No scleral icterus or injection  HENT: Normo-cephalic, atraumatic. No stridor. No rhinorrhea or epistaxis.  CV: Regular rate, irregular rhythm. No murmurs appreciated. Radial pulses 2+ bilaterally.  There is 3+ lower extremity edema bilaterally  Respiratory: Conversationally dyspneic.  Able to speak in complete sentences.  Patient with faint crackles bilaterally.  GI: Soft, protuberant, nontender, no rebound or guarding tenderness  MSK/Extremities: No gross bony deformities. Moving all extremities  Skin: Warm. Appropriate color  Neuro: Alert. Oriented. Face symmetric. Speech is fluent.  Gross strength and sensation intact in b/l UE and LEs  Psych: Appropriate mood and  affect  ???????????????????????????????????????????????????????????????  ED Course/Treatment/Medical Decision Making    EKG Interpretation:  EKG obtained at 1403 demonstrating A-fib with rate of 105, right superior axis deviation, occasional PVC, no ST segment or T wave signs of ischemia.    Independent Interpretation of Studies:  I independently interpreted: Labs as below in ED course    Differential diagnoses considered include but ar not limited to: CHF exacerbation, COVID, pneumonia, pneumothorax, ACS    Social Determinants Limiting Care:  None identified         ED Course:  ED Course as of 10/03/23 2258   Tue Oct 03, 2023   1842 Comprehensive metabolic panel(!)  SG, lytes normal   [SH]   1842 CBC(!) [SH]   2055 Troponin I, High Sensitivity  Trop normal [SH]   2055 B-Type Natriuretic Peptide(!)  BNP mildly elevated [SH]      ED Course User Index  [SH] Srinivas Hager MD         Diagnoses as of 10/03/23 2258   Acute on chronic congestive heart failure, unspecified heart failure type (CMS/HCC)   SG (acute kidney injury) (CMS/HCC)       MDM:  62-year-old male with past medical history notable for A-fib, CHF, hypertension presenting to the emergency department with 1 week of progressively worsening shortness of breath.  On arrival vital signs notable for tachycardia, exam concerning for significant volume overload.  Presentation seems most consistent with CHF exacerbation.  Patient had CBC and CMP obtained in triage which demonstrate SG, likely due to volume overload.  Patient will be treated with IV diuretic, will obtain troponin.  PE as well.  Anticipate admission.    Patient's chest x-ray is consistent with volume overload status.  Troponin and BNP consistent with heart failure exacerbation as well.  Will admit patient to cardiology.  Patient discussed with and accepted by medicine admission coordinators.    As a result of their workup, the patient will require admission to the hospital.  The patient was  informed of their diagnosis.  Patient was given the opportunity to ask questions and answered them.  Patient agreed to be admitted to the hospital.  Patient remained stable and under my care.    Impression:  Acute heart failure exacerbation    Disposition:  Admission    Patient seen and discussed with the ED attending physician    Champ Hager MD  PGY 3 Emergency Medicine      Procedures ? SmartLinks last updated 10/3/2023 10:58 PM        Srinivas Hager MD  Resident  10/03/23 0433

## 2023-10-04 PROBLEM — I50.9 ACUTE ON CHRONIC CONGESTIVE HEART FAILURE, UNSPECIFIED HEART FAILURE TYPE (MULTI): Status: ACTIVE | Noted: 2023-01-01

## 2023-10-04 NOTE — PROGRESS NOTES
"Michael Calderón is a 62 y.o. male on day 0 of admission presenting with Acute on chronic congestive heart failure, unspecified heart failure type (CMS/HCC).    Subjective   Patient states he is still coughing and did not sleep well last night due to shortness of breath. States that he felt that the fluid came back \"faster than last time.\" His typical dry weight is 366 - 370 lbs to the best of his recollection at discharge of his last encounter       Objective     Physical Exam  Constitutional:       General: He is not in acute distress.     Appearance: He is obese. He is ill-appearing.   HENT:      Head: Normocephalic and atraumatic.      Mouth/Throat:      Mouth: Mucous membranes are moist.   Eyes:      Extraocular Movements: Extraocular movements intact.      Pupils: Pupils are equal, round, and reactive to light.   Cardiovascular:      Rate and Rhythm: Normal rate and regular rhythm.      Pulses: Normal pulses.      Heart sounds: No murmur heard.     No friction rub. No gallop.      Comments: Heart sounds difficult to appreciate through body habitus  Pulmonary:      Effort: Pulmonary effort is normal. No respiratory distress.      Breath sounds: No stridor. Wheezing present. No rhonchi or rales.   Abdominal:      General: Bowel sounds are normal. There is distension.      Palpations: Abdomen is soft.      Comments: Pendulous abdomen   Musculoskeletal:         General: No tenderness. Normal range of motion.      Right lower leg: Edema present.      Left lower leg: Edema present.      Comments: 3+ bilateral lower extremity edema   Skin:     General: Skin is warm and dry.      Coloration: Skin is not jaundiced.   Neurological:      General: No focal deficit present.      Mental Status: He is alert and oriented to person, place, and time.   Psychiatric:         Mood and Affect: Mood normal.         Behavior: Behavior normal.       Last Recorded Vitals  Blood pressure 116/65, pulse 106, temperature 36.3 °C (97.3 °F), " resp. rate 24, height 1.829 m (6'), weight (!) 192 kg (423 lb 4.5 oz), SpO2 93 %.  Intake/Output last 3 Shifts:  No intake/output data recorded.    Relevant Results  Scheduled medications  apixaban, 5 mg, oral, q12h  furosemide, 40 mg, intravenous, Once  magnesium sulfate, 2 g, intravenous, Once      Continuous medications     PRN medications  PRN medications: oxygen    Results for orders placed or performed during the hospital encounter of 10/03/23 (from the past 24 hour(s))   CBC   Result Value Ref Range    WBC 10.7 4.4 - 11.3 x10*3/uL    nRBC 0.0 0.0 - 0.0 /100 WBCs    RBC 5.24 4.50 - 5.90 x10*6/uL    Hemoglobin 12.0 (L) 13.5 - 17.5 g/dL    Hematocrit 36.4 (L) 41.0 - 52.0 %    MCV 70 (L) 80 - 100 fL    MCH 22.9 (L) 26.0 - 34.0 pg    MCHC 33.0 32.0 - 36.0 g/dL    RDW 16.4 (H) 11.5 - 14.5 %    Platelets 315 150 - 450 x10*3/uL    MPV 10.8 7.5 - 11.5 fL   Comprehensive metabolic panel   Result Value Ref Range    Glucose 84 74 - 99 mg/dL    Sodium 141 136 - 145 mmol/L    Potassium 4.4 3.5 - 5.3 mmol/L    Chloride 102 98 - 107 mmol/L    Bicarbonate 26 21 - 32 mmol/L    Anion Gap 17 10 - 20 mmol/L    Urea Nitrogen 50 (H) 6 - 23 mg/dL    Creatinine 2.88 (H) 0.50 - 1.30 mg/dL    eGFR 24 (L) >60 mL/min/1.73m*2    Calcium 8.9 8.6 - 10.6 mg/dL    Albumin 2.9 (L) 3.4 - 5.0 g/dL    Alkaline Phosphatase 81 33 - 136 U/L    Total Protein 6.1 (L) 6.4 - 8.2 g/dL    AST 85 (H) 9 - 39 U/L    Bilirubin, Total 0.7 0.0 - 1.2 mg/dL    ALT 26 10 - 52 U/L   Magnesium   Result Value Ref Range    Magnesium 1.91 1.60 - 2.40 mg/dL   B-Type Natriuretic Peptide   Result Value Ref Range     (H) 0 - 99 pg/mL   Troponin I, High Sensitivity   Result Value Ref Range    Troponin I, High Sensitivity 33 0 - 53 ng/L   Urine electrolytes   Result Value Ref Range    Sodium, Urine Random 18 mmol/L    Sodium/Creatinine Ratio 10 Not established. mmol/g Creat    Potassium, Urine Random 62 mmol/L    Potassium/Creatinine Ratio 36 Not established mmol/g  Creat    Chloride, Urine Random 35 mmol/L    Chloride/Creatinine Ratio 20 (L) 23 - 275 mmol/g creat    Creatinine, Urine Random 172.8 20.0 - 370.0 mg/dL   ECG 12 lead   Result Value Ref Range    Ventricular Rate 105 BPM    QRS Duration 96 ms    QT Interval 356 ms    QTC Calculation(Bazett) 470 ms    R Axis 183 degrees    T Axis 99 degrees    QRS Count 17 beats    Q Onset 211 ms    T Offset 389 ms    QTC Fredericia 428 ms     Imaging results over the past 24 hours reviewed.    XR chest 1 view   Final Result   1.  Prominent perihilar interstitial markings which can be seen in   setting of pulmonary edema, recommend correlation with patient's   fluid status.   2. Persistent cardiomegaly.        I personally reviewed the images/study and I agree with the findings   as stated. This study was interpreted at [place], [place].        MACRO:   None        Signed by: Evan Finkelstein 10/3/2023 9:18 PM   Dictation workstation:   [id number]      Vascular US abdomen/pelvis duplex limited    (Results Pending)   Transthoracic Echo (TTE) Limited    (Results Pending)         Assessment/Plan   Principal Problem:    Acute on chronic congestive heart failure, unspecified heart failure type (CMS/HCC)    This is a 62/ male with a PMH of HTN, Afib, PE, HLD, HFpEF, and TRACIE, admitted recently for heart failure exacerbation, and is being re-admitted for volume overload again and SG likely related to congestion and volume overload. Compliant with Torsemide 40mg BID. BP is soft and has Afib with controlled rhythm on admission. Admitted for optimization of volume status and home HF medications.    Update 10/4  -[facility] in, currently on bumex drip  -UA pending, renal US & TTE pending  -BP meds from home continue to be held.    #Heart failure exacerbation with signs of volume overload  ::Poor response to 80mg IV lasix, needs to get catheter for I&O output. [facility] order placed  ::hs Troponin 33, BNP  236   ::received 40mg IV lasix on 10/4  ::significant signs of venous congestion on CXR  Plan  -bumex 2mg IV once, starting bumex drip 1mg/hr  -TTE limited ordered  -Held BP meds for very soft blood pressures including:  -Lisinopril 40mg daily - held   -Amlodipine 5mg daily- held  -Hydralazine 50mg TID- held      #SG  ::likely due to congestion and volume overload, current Cr 2.88, baseline 1.2  Plan  -Will keep monitoring BID RFP  -Urine lytes and kidney US ordered      #HLD  -lipid profile ordered  -Atorvastatin resumed      #Afib   -Controlled rate on admission   -Resumed DOAC     #PE in the past (15 years ago)  -On DOAC, indefinite therapy     F: replete PRN  E: replete PRN, K>4 Mg>2  N: regular 2g Na  A: Peripheral IV    DVT PPX: DOAC  GI Ppx: not indicated    Code Status: Full Code  NOK: Extended Emergency Contact Information  Primary Emergency Contact: Emma Calderón  Home Phone: [phone]  Relation: Spouse         Wade Bailey MD  Anesthesiology, PGY-1  Pager [phone]

## 2023-10-04 NOTE — PROGRESS NOTES
Notes reviewed and approved by [facility] Clinical Pharmacist.  Mary Lou Winslow, PharmD, [facility]  Phone: [phone]    Pt is taking Eliquis.    Pharmacy Medication History Review    Michael Calderón is a 62 y.o. male admitted for No Principal Problem: There is no principal problem currently on the Problem List. Please update the Problem List and refresh.. Pharmacy reviewed the patient's [id number] medications and allergies for accuracy.    The list below reflectives the updated PTA list. Please review each medication in order reconciliation for additional clarification and justification.       Prior to Admission Medications   Prescriptions Last Dose Informant Patient Reported? Taking?   amLODIPine (Norvasc) 5 mg tablet 10/3/2023 Self Yes  Yes    Sig: TAKE 1 TABLET BY MOUTH ONCE DAILY   apixaban (Eliquis) 5 mg tablet  Self  Yes  Yes    Sig: Take 1 tablet (5 mg) by mouth 2 times a day.       Patient needs refills    apixaban (Eliquis) 5 mg tablet  Self No Yes   Sig: TAKE 1 TABLET BY MOUTH TWO TIMES A DAY   atenolol (Tenormin) 100 mg tablet 10/3/2023 Self Yes Yes    Sig: Take 1 tablet (100 mg) by mouth once daily.   Patient has no recent fill history   atorvastatin (Lipitor) 40 mg tablet 10/3/2023 Self Yes Yes    Sig: Take 1 tablet (40 mg) by mouth once daily at bedtime.  Patient has no fill history   hydrALAZINE (Apresoline) 50 mg tablet 10/3/2023 Self Yes  Yes    Sig: TAKE 1 TABLET BY MOUTH EVERY 8 HOURS   lisinopril 40 mg tablet 10/3/2023 Self Yes  Yes   Sig: Take 1 tablet (40 mg) by mouth once daily.   multivitamin with minerals tablet 10/3/2023 Self Yes  Yes    Sig: TAKE 1 TABLET BY MOUTH ONCE DAILY   nystatin (Mycostatin) 100,000 unit/gram powder Past Month Self Yes  Prn    Sig: APPLY TO AFFECTED AREA(S) THREE TIMES A DAY       Patient has reported using this medication as needed   polyethylene glycol (Glycolax, Miralax) 17 gram/dose powder   Past Month Self Yes  Prn    Sig: MIX ONE CAPFUL (17  "GRAMS) IN 8 OUNCES OF LIQUID AND DRINK BY MOUTH ONCE DAILY AS NEEDED FOR CONSTIPATION                                                                 Patient reports only using this medication as needed   potassium chloride CR (Klor-Con M20) 20 mEq ER tablet Past Month Self Yes  Prn   Sig: TAKE 2 TABLETS BY MOUTH ONCE DAILY    Patient reports only using this medication as needed   sennosides-docusate sodium (Annamarie-Colace) 8.6-50 mg tablet Past Month Self Yes  Prn   Sig: TAKE 2 TABLETS BY MOUTH TWO TIMES A DAY  Patient repots only using this medication as needed   torsemide (Demadex) 20 mg tablet 10/3/2023 Self Yes  Yes    Sig: TAKE 2 TABLETS BY MOUTH TWO TIMES A DAY   torsemide 40 mg tablet  Self No No   Sig: Take 40 mg by mouth 2 times a day.      Facility-Administered Medications: None        The list below reflectives the updated allergy list. Please review each documented allergy for additional clarification and justification.  Allergies  Reviewed by Randy Sahu CPhT on 10/4/2023   No Known Allergies         Below are additional concerns with the patient's PTA list.    Patient confirms taking Atenolol 100mg, there is no recent fill history for this medication.  Patient confirms taking Atorvastatin 40mg, there is no recent fill history for this medication.     Patient is a pleasant and moderate historian. Patient remembers medications with prompting. Patient reported that the medication Furosemide 40mg 1 tab daily was replaced by Torsemide 20mg 2 tablets taken 2 times daily as \" insurance wouldn't cover Torsemide 40mg \".      Sources used: Medication list for [employer], OARRS, Clinical Summary from [facility] 4 (8/1/2023)     Randy Sahu CPhT          "

## 2023-10-04 NOTE — CARE PLAN
Problem: Fall/Injury  Goal: Not fall by end of shift  Outcome: Progressing  Goal: Be free from injury by end of the shift  Outcome: Progressing  Goal: Verbalize understanding of personal risk factors for fall in the hospital  Outcome: Progressing  Goal: Verbalize understanding of risk factor reduction measures to prevent injury from fall in the home  Outcome: Progressing  Goal: Use assistive devices by end of the shift  Outcome: Progressing  Goal: Pace activities to prevent fatigue by end of the shift  Outcome: Progressing     Problem: Heart Failure  Goal: Improved gas exchange this shift  Outcome: Progressing  Goal: Improved urinary output this shift  Outcome: Progressing  Goal: Reduction in peripheral edema within 24 hours  Outcome: Progressing  Goal: Report improvement of dyspnea/breathlessness this shift  Outcome: Progressing  Goal: Weight from fluid excess reduced over 2-3 days, then stabilize  Outcome: Progressing  Goal: Increase self care and/or family involvement in 24 hours  Outcome: Progressing     Problem: Pain  Goal: Takes deep breaths with improved pain control throughout the shift  Outcome: Progressing  Goal: Turns in bed with improved pain control throughout the shift  Outcome: Progressing  Goal: Walks with improved pain control throughout the shift  Outcome: Progressing  Goal: Performs ADL's with improved pain control throughout shift  Outcome: Progressing  Goal: Participates in PT with improved pain control throughout the shift  Outcome: Progressing  Goal: Free from opioid side effects throughout the shift  Outcome: Progressing  Goal: Free from acute confusion related to pain meds throughout the shift  Outcome: Progressing   The patient's goals for the shift include

## 2023-10-04 NOTE — HOSPITAL COURSE
Michael Calderón is a 63yo male with a PMH of HTN, Afib, PE, HLD, HFpEF, and TRACIE, admitted recently for heart failure exacerbation, and is being re-admitted for volume overload again and SG likely related to congestion and volume overload. Compliant with Torsemide 40mg BID. BP is soft and has Afib with controlled rhythm on admission. Admitted for optimization of volume status and home HF medications. Patient was started on a bumex drip with additional bumex and metolazone boluses PRN as kidney function and hemodynamic stability allowed.     Patient experienced a rapid response on 10/11 with initial Bps 68/34, quickly improved to 100s/80s. Likely vasovagal response in the setting of intravascular depletion from aggressive diuresis. Diuretic holiday was granted and diuresis resumed after one day. Shortly after diuresis was resumed, patient developed worsening renal function and retention was suspected as medina was recently discontinued. Medina was replaced and additional diuresis holidays granted.

## 2023-10-04 NOTE — H&P
History Of Present Illness  Michael Calderón is a 62 y.o. male presenting with worsening SOB.    Patient was admitted for heart failure exacerbation in mid 7/2023 as he presented with SOB and signs of volume overload, was also requiring O2. Managed inpatient with IV 80mg IV lasix then 40mg BID. Then was discharged on Torsemide 40mg BID and KCL po.     Afterwards he started feeling better for a while, regaining his strength with home PT/OT, until 2 weeks ago when he started to feel more short of breath and dyspnic, worsening cough productive of clear sputum, he started noticing worsening swelling in his lower extremities and abdominal distension and discomfort. He states that his SOB is mostly on exertion, he is able to lie flat and is complaint with his CPAP, generally he is not feeling like he recovered from his illness on the previous admission. Patient denied any chest pain or epigastric pain. He states that he has more of a runny stools lately although he feels like he is constipated. Appetite is ok with no changes and he thinks he has been gaining weight. Patient denied any fevers/ chills, nausea/ vomiting.     Patient is able to care for himself and is independant in daily activities, he is morbidly obese and uses a walker sometimes. He is able to drive, however has not been driving lately due to feeling unwell. Lives with wife in a house 10 minutes away from the hospital by car. Patient was brought into the ED by his wife.    In the ED:  CXR:  1.  Prominent perihilar interstitial markings which can be seen in  setting of pulmonary edema, recommend correlation with patient's  fluid status.  2. Persistent cardiomegaly.    EKG:  Afib with , with PVCs, no signs of acute ischemia     SG with creatinin of 2.88 from 1.2   AST of 85 with ALT of 26     hs Troponin 33    Past Medical History  Hypertension   Atrial fibrillation   HFpEF s/p previous admissions for exacerbation   HLD   Morbid obesity  "  Multiple pulmonary embolism   TRACIE     Surgical History  He has no past surgical history on file.     Social History  He reports that he has never smoked. He has never used smokeless tobacco. He reports that he does not drink alcohol and does not use drugs.    Family History  Family History   Problem Relation Name Age of Onset    Hypertension Father      Other (cardiac disorder) Father      Pulmonary embolism Brother          Allergies  Patient has no known allergies.    Review of Systems  14- point ROS conducted and was negative unless otherwise specified above.      It was difficult to exam JVD as he was in a hospital chair (did not have a bed in the ED) and he has obese neck     Physical Exam  Patient is alert and oriented X3, pleasant, conversational dyspnea, morbid obesity   Heart sounds are distant and irregular, JVD could not be appreciated due to morbid obesity   Has rales and crackles in bilateral lung fields   Has +3 pitting lower ex edema   Has distended and non tender abdomen      Last Recorded Vitals  Blood pressure 98/64, pulse 95, temperature 37.2 °C (98.9 °F), temperature source Oral, resp. rate 20, height 1.85 m (6' 0.84\"), weight (!) 208 kg (458 lb 5.4 oz), SpO2 95 %.    Relevant Results  In the ED received 80mg lasix with 250cc out, unsure if he is retaining urine as bladder scanning him in the ED was very challening and inaccurate due to body habits      Assessment/Plan   This is a 62/ male, admitted recently for heart failure exacerbation, and is being re-admitted for volume overload again and SG likely related to congestion and volume overload. Compliant with Torsemide 40mg BID. BP is soft and has Afib with controlled rhythm on admission. Admitted for optimization of volume status and home HF medications.    #Heart failure exacerbation with signs of volume overload   -Poor response to 80mg IV lasix   -hs Troponin 33,   -Held BP meds for very soft blood pressures including:  Lisinopril " 40mg daily - held   Amlodipine 5mg daily- held  Hydralazine 50mg TID- held   -Will insert medina and diurese more     #SG, likely due to congestion and volume overload   -Will keep monitoring BID RFP   -Urine lytes and kidney US ordered     #HLD- lipid profile ordered and Atorvastatin resumed     #Afib   -Controlled rate on admission   -Resumed DOAC    #PE in the past (15 years ago)  -On DOAC now     #DVT ppx   -DOAC resumed     #GI ppx  -Not indicated     #Code status: Full- confirmed on admission     #Wife (Emma), [phone]    Susy Tavares MD

## 2023-10-05 NOTE — PROGRESS NOTES
Michael Calderón is a 62 y.o. male on day 1 of admission presenting with Acute on chronic congestive heart failure, unspecified heart failure type (CMS/HCC).    Subjective   Patient states that he is still somewhat uncomfortable but did report sleeping better last night, still having regular bowel movements and not reporting discomfort from medina. No acute events overnight.       Objective     Physical Exam  Constitutional:       General: He is not in acute distress.     Appearance: He is obese. He is ill-appearing.   HENT:      Head: Normocephalic and atraumatic.      Mouth/Throat:      Mouth: Mucous membranes are moist.   Eyes:      Extraocular Movements: Extraocular movements intact.      Pupils: Pupils are equal, round, and reactive to light.   Cardiovascular:      Rate and Rhythm: Normal rate and regular rhythm.      Pulses: Normal pulses.      Heart sounds: No murmur heard.     No friction rub. No gallop.      Comments: Heart sounds difficult to appreciate through body habitus  Pulmonary:      Effort: Pulmonary effort is normal. No respiratory distress.      Breath sounds: Normal breath sounds. No stridor. No rhonchi or rales.   Abdominal:      General: Bowel sounds are normal. There is distension.      Palpations: Abdomen is soft.      Comments: Pendulous abdomen   Musculoskeletal:         General: No tenderness. Normal range of motion.      Right lower leg: Edema present.      Left lower leg: Edema present.      Comments: 3+ bilateral lower extremity edema   Skin:     General: Skin is warm and dry.      Coloration: Skin is not jaundiced.   Neurological:      General: No focal deficit present.      Mental Status: He is alert and oriented to person, place, and time.   Psychiatric:         Mood and Affect: Mood normal.         Behavior: Behavior normal.         Last Recorded Vitals  Blood pressure 94/63, pulse (!) 118, temperature 36.3 °C (97.3 °F), resp. rate 18, height 1.829 m (6'), weight (!) 192 kg (423 lb  4.5 oz), SpO2 92 %.  Intake/Output last 3 Shifts:  I/O last 3 completed shifts:  In: 240 (1.3 mL/kg) [P.O.:240]  Out: 1200 (6.3 mL/kg) [Urine:1200 (0.2 mL/kg/hr)]  Weight: 192 kg     Relevant Results  Scheduled medications  apixaban, 5 mg, oral, q12h      Continuous medications  bumetanide, 2 mg/hr, Last Rate: 2 mg/hr (10/05/23 1126)    PRN medications  PRN medications: acetaminophen, oxygen    Results for orders placed or performed during the hospital encounter of 10/03/23 (from the past 24 hour(s))   Transthoracic Echo (TTE) Limited   Result Value Ref Range    LV A4C EF 66.0    Hemoglobin A1c   Result Value Ref Range    Hemoglobin A1C 5.6 see below %    Estimated Average Glucose 114 Not Established mg/dL   Magnesium   Result Value Ref Range    Magnesium 2.23 1.60 - 2.40 mg/dL   Renal Function Panel   Result Value Ref Range    Glucose 67 (L) 74 - 99 mg/dL    Sodium 143 136 - 145 mmol/L    Potassium 4.5 3.5 - 5.3 mmol/L    Chloride 102 98 - 107 mmol/L    Bicarbonate 28 21 - 32 mmol/L    Anion Gap 18 10 - 20 mmol/L    Urea Nitrogen 54 (H) 6 - 23 mg/dL    Creatinine 3.10 (H) 0.50 - 1.30 mg/dL    eGFR 22 (L) >60 mL/min/1.73m*2    Calcium 9.2 8.6 - 10.6 mg/dL    Phosphorus 6.0 (H) 2.5 - 4.9 mg/dL    Albumin 3.2 (L) 3.4 - 5.0 g/dL   Magnesium   Result Value Ref Range    Magnesium 2.13 1.60 - 2.40 mg/dL   Renal Function Panel   Result Value Ref Range    Glucose 89 74 - 99 mg/dL    Sodium 141 136 - 145 mmol/L    Potassium 4.5 3.5 - 5.3 mmol/L    Chloride 101 98 - 107 mmol/L    Bicarbonate 25 21 - 32 mmol/L    Anion Gap 20 10 - 20 mmol/L    Urea Nitrogen 59 (H) 6 - 23 mg/dL    Creatinine 3.17 (H) 0.50 - 1.30 mg/dL    eGFR 22 (L) >60 mL/min/1.73m*2    Calcium 8.8 8.6 - 10.6 mg/dL    Phosphorus 6.1 (H) 2.5 - 4.9 mg/dL    Albumin 2.9 (L) 3.4 - 5.0 g/dL     Imaging results over the past 24 hours reviewed.    US renal complete   Final Result   1. No hydronephrosis or acute abnormality of the kidneys.   2. Incidentally noted  hypoechoic peripancreatic and retroperitoneal   soft tissue lesions likely represent lymphadenopathy which were seen   on the prior CT dated 08/21/2021. Differential diagnosis includes   lymphoma, leukemia, immunosuppression, autoimmune disease, or severe   lymphatic dysfunction.        I personally reviewed the images/study and I agree with the findings   as stated. This study was interpreted at [place], [place].        MACRO:   None.        Signed by: Ron Barrientos 10/4/2023 5:32 PM   Dictation workstation:   [id number]      Transthoracic Echo (TTE) Limited   Final Result      XR chest 1 view   Final Result   1.  Prominent perihilar interstitial markings which can be seen in   setting of pulmonary edema, recommend correlation with patient's   fluid status.   2. Persistent cardiomegaly.        I personally reviewed the images/study and I agree with the findings   as stated. This study was interpreted at [place], [place].        MACRO:   None        Signed by: Evan Finkelstein 10/3/2023 9:18 PM   Dictation workstation:   [id number]            Assessment/Plan   Principal Problem:    Acute on chronic congestive heart failure, unspecified heart failure type (CMS/HCC)    This is a 62/ male with a PMH of HTN, Afib, PE, HLD, HFpEF, and TRACIE, admitted recently for heart failure exacerbation, and is being re-admitted for volume overload again and SG likely related to congestion and volume overload. Compliant with Torsemide 40mg BID. BP is soft and has Afib with controlled rhythm on admission. Admitted for optimization of volume status and home HF medications.    Update 10/5  -medina in, currently on bumex drip - increased bumex drip to 2mg/hr, added 3mg bolus this AM  -UA bland, TTE showed pericardial effusion without evidence of tamponade but otherwise stable from TTE obtained during last admission. Renal US showed  lymphadenopathy, likely 2/2 patient's ongoing severe lymphedema.  -BP meds from home continue to be held.    #Heart failure exacerbation with signs of volume overload  ::Poor response to 80mg IV lasix, needs to get catheter for I&O output. [facility] order placed  ::hs Troponin 33,    ::received 40mg IV lasix on 10/4  ::significant signs of venous congestion on CXR  Plan  -bumex 2mg IV once, starting bumex drip 1mg/hr  -TTE limited ordered  -Held BP meds for very soft blood pressures including:  -Lisinopril 40mg daily - held   -Amlodipine 5mg daily- held  -Hydralazine 50mg TID- held      #SG  ::likely due to congestion and volume overload, current Cr 2.88, baseline 1.2  Plan  -Will keep monitoring BID RFP  -Urine lytes and kidney US ordered      #HLD  -lipid profile ordered  -Atorvastatin resumed      #Afib   -Controlled rate on admission   -Resumed DOAC     #PE in the past (15 years ago)  -On DOAC, indefinite therapy     F: replete PRN  E: replete PRN, K>4 Mg>2  N: regular 2g Na  A: Peripheral IV    DVT PPX: DOAC  GI Ppx: not indicated    Code Status: Full Code  NOK: Extended Emergency Contact Information  Primary Emergency Contact: Emma Calderón  Home Phone: [phone]  Relation: Spouse         Wade Bailey MD  Anesthesiology, PGY-1  Pager [phone]

## 2023-10-05 NOTE — SIGNIFICANT EVENT
RADAR AUTO PAGE   10/05/23 1149   Onset Documentation   Rapid Response Initiated By Radar auto page   [place] [facility]ager Time 1148   Arrival Time 1148   Event End Time 1149   Level II Called No   Primary Reason for Call Radar auto page       Vitals:    10/05/23 0301 10/05/23 0743 10/05/23 0825 10/05/23 1151   BP: 94/54 93/56  94/63   Pulse: (!) 117 61 (!) 113 (!) 118   Resp: 20 18  18   Temp: 36.3 °C (97.3 °F) 36.2 °C (97.2 °F)  36.3 °C (97.3 °F)   TempSrc:       SpO2: 95% 91% 92% 92%   Weight:       Height:         Rapid Response RN Note    RADAR score 6 for the following VS:  height is 1.829 m (6') and weight is 192 kg (423 lb 4.5 oz) (abnormal). His temperature is 36.3 °C (97.3 °F). His blood pressure is 94/63 and his pulse is 118 (abnormal). His respiration is 18 and oxygen saturation is 92%.      Reviewed above VS with bedside RN.  Patient denied pain, shortness of breath, dizziness or lightheadedness.    Staff to page rapid response for any concerns or acute change in condition/VS.

## 2023-10-05 NOTE — CARE PLAN
The patient's goals for the shift include      The clinical goals for the shift include Urinate a minimum of 350    Over the shift, the patient did not make progress toward the following goals. Barriers to progression include ***. Recommendations to address these barriers include ***.

## 2023-10-05 NOTE — CARE PLAN
The patient's goals for the shift include      The clinical goals for the shift include Pt will be free of pain this shift.

## 2023-10-05 NOTE — SIGNIFICANT EVENT
Rapid Response Note     10/05/23 0835   Onset Documentation   Rapid Response Initiated By Radar auto page   [place] [facility] [place]   [place]ager Time 0829   Arrival Time 0835   Event End Time 0840   Level II Called No   Primary Reason for Call Radar auto page     Rapid Response RN Note    CHRISTINE score 8 for the following VS:  His temperature is 36.2 °C (97.2 °F). His blood pressure is 93/56 and his pulse is 61 (abnormal). His respiration is 113 and oxygen saturation is 92%.  Error by RN in transcribing vitals.  Actual RR is 18 and pulse is 113.  Oxygen saturation is 92%.  Patient is asymptomatic and has no complaints.     Reviewed above VS with bedside RN.  Patient denied pain, shortness of breath, dizziness or lightheadedness.    Staff to page rapid response for any concerns or acute change in condition/VS.

## 2023-10-05 NOTE — PROGRESS NOTES
"Physical Therapy    Physical Therapy Evaluation    Patient Name: Michael Calderón  MRN: [id number]  Today's Date: 10/5/2023   Time Calculation  Start Time: 1123  Stop Time: 1148  Time Calculation (min): 25 min    Assessment/Plan   PT Assessment  PT Assessment Results: Decreased strength, Decreased endurance, Decreased mobility  Rehab Prognosis: Good  End of Session Communication: Bedside nurse  End of Session Patient Position: Alarm off, not on at start of session (seated EOB)  IP OR SWING BED PT PLAN  Inpatient or Swing Bed: Inpatient  PT Plan  Treatment/Interventions: Bed mobility, Transfer training, Gait training, Stair training, Strengthening  PT Plan: Skilled PT  PT Frequency: 3 times per week  PT Discharge Recommendations: Low intensity level of continued care      Subjective   General Visit Information:  General  Reason for Referral: Acute heart failure exacerbation  Past Medical History Relevant to Rehab: HTN, a-fib, HFpEF, TRACIE, PE, morbid obesity  Prior to Session Communication: Bedside nurse  Patient Position Received: Bed, 3 rail up, Alarm off, not on at start of session  Home Living:  Home Living  Type of Home: House  Lives With: Spouse  Home Adaptive Equipment: Cane (walker)  Home Layout: Two level (has been staying on the first floor)  Home Access:  (3 steps to enter)  Prior Level of Function:  Prior Function Per Pt/Caregiver Report  ADL Assistance: Independent  Homemaking Assistance: Independent  Ambulatory Assistance: Independent (uses a cane or walker as needed)  Vocational: Retired  Leisure: +drives  Precautions:  Precautions  Medical Precautions: Fall precautions  Vital Signs:  Vital Signs  Heart Rate:  (supine 108, sitting 124, standing 153, sitting 122 (RN informed of HR))  Heart Rate Source: Monitor    Objective   Pain:  Pain Assessment  Pain Score:  (c/o feeling \"sore all over\")  Cognition:  Cognition  Overall Cognitive Status: Within Functional Limits    General Assessments:       "   Sensation  Light Touch: No apparent deficits    Strength  Strength Comments: BLE 5/5           Static Sitting Balance  Static Sitting-Level of Assistance: Distant supervision  Dynamic Sitting Balance  Dynamic Sitting-Comments:  (SBA)    Static Standing Balance  Static Standing-Level of Assistance: Contact guard (with cane)  Dynamic Standing Balance  Dynamic Standing-Comments:  (CGA with cane)  Functional Assessments:       Bed Mobility  Bed Mobility: Yes  Bed Mobility 1  Bed Mobility 1: Supine to sitting  Level of Assistance 1:  (SBA)  Bed Mobility Comments 1: HOB elevated, use of rails    Transfers  Transfer: Yes  Transfer 1  Technique 1: Sit to stand, Stand to sit  Transfer Device 1: Cane  Transfer Level of Assistance 1: Contact guard    Ambulation/Gait Training  Ambulation/Gait Training Performed: Yes  Ambulation/Gait Training 1  Device 1:  (cane)  Assistance 1: Contact guard  Quality of Gait 1:  (decreased step length)  Comments/Distance (ft) 1: 3-4 sidesteps (deferred further ambulation due to elevated HR to 153)        Extremity/Trunk Assessments:              RLE   RLE : Within Functional Limits  LLE   LLE : Within Functional Limits  Outcome Measures:  [facility] Basic Mobility  Turning from your back to your side while in a flat bed without using bedrails: A little  Moving from lying on your back to sitting on the side of a flat bed without using bedrails: A little  Moving to and from bed to chair (including a wheelchair): A little  Standing up from a chair using your arms (e.g. wheelchair or bedside chair): A little  To walk in hospital room: A little  Climbing 3-5 steps with railing: A little  Basic Mobility - Total Score: 18    Encounter Problems       Encounter Problems (Active)       Mobility       STG - Patient will ambulate 75 feet x2 with LRD modified independent (Progressing)       Start:  10/05/23    Expected End:  10/19/23            STG - Patient will ascend and descend 3 stairs with LRD modified  independent (Progressing)       Start:  10/05/23    Expected End:  10/19/23               Transfers       STG - Patient will perform bed mobility independent (Progressing)       Start:  10/05/23    Expected End:  10/19/23            STG - Patient will transfer sit to and from stand with LRD modified independent (Progressing)       Start:  10/05/23    Expected End:  10/19/23                   Education Documentation  Precautions, taught by Humera Monique, PT at 10/5/2023 12:22 PM.  Learner: Patient  Readiness: Acceptance  Method: Explanation  Response: Verbalizes Understanding    Mobility Training, taught by Humera Monique, PT at 10/5/2023 12:22 PM.  Learner: Patient  Readiness: Acceptance  Method: Explanation  Response: Verbalizes Understanding    Education Comments  No comments found.

## 2023-10-05 NOTE — CARE PLAN
Problem: Mobility  Goal: STG - Patient will ambulate 75 feet x2 with LRD modified independent  Outcome: Progressing  Goal: STG - Patient will ascend and descend 3 stairs with LRD modified independent  Outcome: Progressing     Problem: Transfers  Goal: STG - Patient will perform bed mobility independent  Outcome: Progressing  Goal: STG - Patient will transfer sit to and from stand with LRD modified independent  Outcome: Progressing

## 2023-10-06 PROBLEM — I10 ESSENTIAL HYPERTENSION: Status: ACTIVE | Noted: 2017-03-22

## 2023-10-06 PROBLEM — I48.20 CHRONIC ATRIAL FIBRILLATION (MULTI): Status: ACTIVE | Noted: 2023-01-01

## 2023-10-06 PROBLEM — G89.29 CHRONIC PAIN OF BOTH KNEES: Status: ACTIVE | Noted: 2019-06-27

## 2023-10-06 PROBLEM — Z86.718 PERSONAL HISTORY OF OTHER VENOUS THROMBOSIS AND EMBOLISM: Status: ACTIVE | Noted: 2023-01-01

## 2023-10-06 PROBLEM — M25.562 CHRONIC PAIN OF BOTH KNEES: Status: ACTIVE | Noted: 2019-06-27

## 2023-10-06 PROBLEM — M17.0 PRIMARY OSTEOARTHRITIS OF BOTH KNEES: Status: ACTIVE | Noted: 2019-06-27

## 2023-10-06 PROBLEM — A41.9 SEPSIS WITH ACUTE HYPOXIC RESPIRATORY FAILURE (MULTI): Status: RESOLVED | Noted: 2023-01-01 | Resolved: 2023-01-01

## 2023-10-06 PROBLEM — J96.01 SEPSIS WITH ACUTE HYPOXIC RESPIRATORY FAILURE (MULTI): Status: RESOLVED | Noted: 2023-01-01 | Resolved: 2023-01-01

## 2023-10-06 PROBLEM — E87.70 VOLUME OVERLOAD: Status: ACTIVE | Noted: 2023-01-01

## 2023-10-06 PROBLEM — I16.0 HYPERTENSIVE URGENCY: Status: RESOLVED | Noted: 2023-01-01 | Resolved: 2023-01-01

## 2023-10-06 PROBLEM — I50.33 ACUTE ON CHRONIC DIASTOLIC (CONGESTIVE) HEART FAILURE (MULTI): Status: ACTIVE | Noted: 2023-01-01

## 2023-10-06 PROBLEM — K63.5 POLYP OF COLON: Status: ACTIVE | Noted: 2018-04-04

## 2023-10-06 PROBLEM — R60.0 LOWER EXTREMITY EDEMA: Status: ACTIVE | Noted: 2018-04-04

## 2023-10-06 PROBLEM — Z79.01 LONG TERM (CURRENT) USE OF ANTICOAGULANTS: Status: ACTIVE | Noted: 2023-01-01

## 2023-10-06 PROBLEM — G47.30 SLEEP APNEA: Status: ACTIVE | Noted: 2017-03-22

## 2023-10-06 PROBLEM — I87.2 VENOUS INSUFFICIENCY (CHRONIC) (PERIPHERAL): Status: ACTIVE | Noted: 2023-01-01

## 2023-10-06 PROBLEM — R65.20 SEPSIS WITH ACUTE HYPOXIC RESPIRATORY FAILURE (MULTI): Status: RESOLVED | Noted: 2023-01-01 | Resolved: 2023-01-01

## 2023-10-06 PROBLEM — M25.561 CHRONIC PAIN OF BOTH KNEES: Status: ACTIVE | Noted: 2019-06-27

## 2023-10-06 PROBLEM — I50.1 LEFT VENTRICULAR FAILURE, UNSPECIFIED (MULTI): Status: RESOLVED | Noted: 2023-01-01 | Resolved: 2023-01-01

## 2023-10-06 PROBLEM — N50.89 SCROTAL EDEMA: Status: ACTIVE | Noted: 2023-01-01

## 2023-10-06 PROBLEM — Z86.711 HISTORY OF PULMONARY EMBOLISM: Status: ACTIVE | Noted: 2023-01-01

## 2023-10-06 PROBLEM — E66.01 MORBID OBESITY (MULTI): Status: ACTIVE | Noted: 2017-04-19

## 2023-10-06 PROBLEM — J96.01 ACUTE RESPIRATORY FAILURE WITH HYPOXIA (MULTI): Status: RESOLVED | Noted: 2023-01-01 | Resolved: 2023-01-01

## 2023-10-06 PROBLEM — I11.0 HYPERTENSIVE HEART DISEASE WITH HEART FAILURE (MULTI): Status: RESOLVED | Noted: 2023-01-01 | Resolved: 2023-01-01

## 2023-10-06 NOTE — PROGRESS NOTES
Occupational Therapy    Evaluation    Patient Name: Michael Calderón  MRN: [id number]  Today's Date: 10/6/2023  Time Calculation  Start Time: 1552  Stop Time: 1609  Time Calculation (min): 17 min        Assessment:  OT Assessment: Patient is a 62 year old male admitted to hospital with acute on chronic congested hear failure. He presents with impaired ADL, functional mobility, functional transfer, bed mobility, activity tolerance, UE strength. He would benefit from skilled OT services while in hospital and post discharge.  Prognosis: Good  Prognosis: Good  Plan:  Treatment Interventions: ADL retraining, Functional transfer training, UE strengthening/ROM, Endurance training, Patient/family training (functional mobility training)  OT Frequency: 3 times per week  OT Discharge Recommendations: Moderate intensity level of continued care  Treatment Interventions: ADL retraining, Functional transfer training, UE strengthening/ROM, Endurance training, Patient/family training (functional mobility training)    Subjective   Current Problem:  1. SG (acute kidney injury) (CMS/HCC)  CANCELED: Vascular US abdomen/pelvis duplex limited    CANCELED: Vascular US abdomen/pelvis duplex limited      2. Acute on chronic congestive heart failure, unspecified heart failure type (CMS/HCC)  Transthoracic Echo (TTE) Limited    Transthoracic Echo (TTE) Limited      3. Hypertensive heart disease with heart failure (CMS/HCC)  Transthoracic Echo (TTE) Limited    Transthoracic Echo (TTE) Limited        General:  General  Reason for Referral: referred to OT for ADL assessment and tx following admission to hospital with acute on chronic congestive heart failure  Past Medical History Relevant to Rehab: HTN, Afib, PE, HLD, HFpEF, and TRACIE, recent heart failure admission  Prior to Session Communication: Bedside nurse  Patient Position Received: Bed, 3 rail up (supine)  Precautions:     Vital Signs:     Pain:  Pain Assessment  Pain Assessment: 0-10  Pain  "Score: 5 - Moderate pain  Pain Location:  (\"everywhere\")    Objective   Cognition:  Overall Cognitive Status:  (oriented to month,year and place, confused to date)           Home Living:  Type of Home: House  Lives With: Spouse  Home Adaptive Equipment:  (shower chiar, walker, cane)  Home Layout: Two level  Home Access: No concerns  Bathroom Shower/Tub: Tub/shower unit  Bathroom Toilet: Standard (tall toilet upstairs)  Bathroom Accessibility: full bath 1st and 2nd floors  Prior Function:  Level of [place]: Independent with ADLs and functional transfers, Independent with homemaking with ambulation  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)  Leisure: enjoys traveling  Hand Dominance: Left       ADL:  Eating Assistance: Independent  Grooming Assistance: Stand by (setup)  Bathing Assistance: Maximal  UE Dressing Assistance: Moderate  LE Dressing Assistance: Maximal  Toileting Assistance with Device: Minimal  Activity Tolerance:  Endurance:  (poor)  Bed Mobility/Transfers: Bed Mobility  Bed Mobility: Yes  Bed Mobility 1  Bed Mobility 1: Rolling left, Rolling right  Level of Assistance 1:  (SBA)   Modalities:     Vision:Vision - Basic Assessment  Current Vision: Wears glasses for distance only     Extremities: RUE AROM (degrees)  RUE AROM Comment: WFL  RUE Strength  RUE Overall Strength:  (4/5), LUE AROM (degrees)  LUE AROM Comment: WFL  LUE Strength  LUE Overall Strength:  (4/5 overall),  , and        Outcome Measures:[facility] Daily Activity  Putting on and taking off regular lower body clothing: A lot  Bathing (including washing, rinsing, drying): A lot  Putting on and taking off regular upper body clothing: A lot  Toileting, which includes using toilet, bedpan or urinal: A little  Taking care of personal grooming such as brushing teeth: None  Eating Meals: None  Daily Activity - Total Score: 17        Education Documentation  ADL Training, taught by Tanvi Marin OT at 10/6/2023  4:43 PM.  Learner: " Patient  Readiness: Refuses  Method: Explanation, Demonstration  Response: Verbalizes Understanding  Comment: Patient refused to attempt use of figure 4 technique.    Education Comments  No comments found.            Goals:  Encounter Problems       Encounter Problems (Active)       ADLs       Patient will perform UB and LB bathing with stand by assist level of assistance while sitting in bedside chair. (Progressing)       Start:  10/06/23    Expected End:  10/20/23            Patient with complete upper body dressing with stand by assist level of assistance donning and doffing all UE clothes with no adaptive equipment while supported sitting (Progressing)       Start:  10/06/23    Expected End:  10/20/23            Patient with complete lower body dressing with stand by assist level of assistance donning and doffing all LE clothes  with PRN adaptive equipment while sitting in bedside chair. (Progressing)       Start:  10/06/23    Expected End:  10/20/23            Patient will complete toileting including clothing management/hygiene with stand by assist level of assistance and grab bars. (Progressing)       Start:  10/06/23    Expected End:  10/20/23               MOBILITY       Patient will perform Functional mobility Household distances/Community Distances with stand by assist level of assistance and front wheeled walker in order to improve safety and functional mobility. (Progressing)       Start:  10/06/23    Expected End:  10/20/23               Mobility       STG - Patient will ambulate 75 feet x2 with LRD modified independent (Progressing)       Start:  10/05/23    Expected End:  10/19/23            STG - Patient will ascend and descend 3 stairs with LRD modified independent (Progressing)       Start:  10/05/23    Expected End:  10/19/23               TRANSFERS       Patient will perform bed mobility independent level of assistance and bed rails in order to improve safety and independence with mobility  (Progressing)       Start:  10/06/23    Expected End:  10/20/23            Patient will complete functional transfer to al surfaces with front wheeled walker with stand by assist level of assistance. (Progressing)       Start:  10/06/23    Expected End:  10/20/23               Transfers       STG - Patient will perform bed mobility independent (Progressing)       Start:  10/05/23    Expected End:  10/19/23            STG - Patient will transfer sit to and from stand with LRD modified independent (Progressing)       Start:  10/05/23    Expected End:  10/19/23

## 2023-10-06 NOTE — NURSING NOTE
Assumed care of pt at shift change.  Pt moved to rm [id number]. Pt attached to telemetry and o2 2l/nc remains on.  Starkey draining cyu.  Bumex infusion increased  per order.

## 2023-10-06 NOTE — SIGNIFICANT EVENT
Rapid Response Note     10/06/23 1630   Onset Documentation   Rapid Response Initiated By Radar auto page   Pager Time [phone]   Arrival Time 1630   Event End Time 1640   Level II Called No   Primary Reason for Call Radar auto page     Rapid Response RN Note    CHRISTINE score 7 for the following VS:  His temperature is 36.4 °C (97.5 °F). His blood pressure is 92/58 and his pulse is 119 (abnormal). His respiration is 24 and oxygen saturation is 95%.      Reviewed above VS with bedside RN.  Patient denied pain, shortness of breath, dizziness or lightheadedness.  Patient is admitted for CHF exacerbation and is on a bumex gtt.      Staff to page rapid response for any concerns or acute change in condition/VS.

## 2023-10-06 NOTE — CARE PLAN
Problem: ADLs  Goal: Patient will perform UB and LB bathing with stand by assist level of assistance while sitting in bedside chair.  Outcome: Progressing  Goal: Patient with complete upper body dressing with stand by assist level of assistance donning and doffing all UE clothes with no adaptive equipment while supported sitting  Outcome: Progressing  Goal: Patient with complete lower body dressing with stand by assist level of assistance donning and doffing all LE clothes  with PRN adaptive equipment while sitting in bedside chair.  Outcome: Progressing  Goal: Patient will complete toileting including clothing management/hygiene with stand by assist level of assistance and grab bars.  Outcome: Progressing     Problem: TRANSFERS  Goal: Patient will perform bed mobility independent level of assistance and bed rails in order to improve safety and independence with mobility  Outcome: Progressing  Goal: Patient will complete functional transfer to al surfaces with front wheeled walker with stand by assist level of assistance.  Outcome: Progressing

## 2023-10-06 NOTE — PROGRESS NOTES
Michael Calderón is a 62 y.o. male on day 2 of admission presenting with Acute on chronic congestive heart failure, unspecified heart failure type (CMS/HCC).    Subjective   Patient states that he is still somewhat uncomfortable but states work of breathing is easier. Still having regular bowel movements and not reporting discomfort from [facility]. No acute events overnight, but complaining of cramping, likely 2/2 bumex. Gave APAP, patient reported minimal improvement.       Objective     Physical Exam  Constitutional:       General: He is not in acute distress.     Appearance: He is obese. He is ill-appearing.   HENT:      Head: Normocephalic and atraumatic.      Mouth/Throat:      Mouth: Mucous membranes are moist.   Eyes:      Extraocular Movements: Extraocular movements intact.      Pupils: Pupils are equal, round, and reactive to light.   Cardiovascular:      Rate and Rhythm: Normal rate and regular rhythm.      Pulses: Normal pulses.      Heart sounds: No murmur heard.     No friction rub. No gallop.      Comments: Heart sounds difficult to appreciate through body habitus  Pulmonary:      Effort: Pulmonary effort is normal. No respiratory distress.      Breath sounds: Normal breath sounds. No stridor. No rhonchi or rales.   Abdominal:      General: Bowel sounds are normal. There is distension.      Palpations: Abdomen is soft.      Comments: Pendulous abdomen   Musculoskeletal:         General: No tenderness. Normal range of motion.      Right lower leg: Edema present.      Left lower leg: Edema present.      Comments: 3+ bilateral lower extremity edema   Skin:     General: Skin is warm and dry.      Coloration: Skin is not jaundiced.   Neurological:      General: No focal deficit present.      Mental Status: He is alert and oriented to person, place, and time.   Psychiatric:         Mood and Affect: Mood normal.         Behavior: Behavior normal.         Last Recorded Vitals  Blood pressure 105/64, pulse 74,  temperature 36.3 °C (97.3 °F), resp. rate 18, height 1.829 m (6'), weight (!) 198 kg (436 lb 13.5 oz), SpO2 96 %.  Intake/Output last 3 Shifts:  I/O last 3 completed shifts:  In: 421 (2.2 mL/kg) [P.O.:240; I.V.:181 (0.9 mL/kg)]  Out: 4025 (21 mL/kg) [Urine:4025 (0.6 mL/kg/hr)]  Weight: 192 kg     Relevant Results  Scheduled medications  acetaminophen, 975 mg, oral, q8h BASILIO  apixaban, 5 mg, oral, q12h  polyethylene glycol, 17 g, oral, BID  sennosides-docusate sodium, 2 tablet, oral, Once      Continuous medications  bumetanide, 2 mg/hr, Last Rate: 2 mg/hr (10/06/23 1238)    PRN medications  PRN medications: oxygen    Results for orders placed or performed during the hospital encounter of 10/03/23 (from the past 24 hour(s))   Magnesium   Result Value Ref Range    Magnesium 2.10 1.60 - 2.40 mg/dL   Renal Function Panel   Result Value Ref Range    Glucose 98 74 - 99 mg/dL    Sodium 143 136 - 145 mmol/L    Potassium 4.1 3.5 - 5.3 mmol/L    Chloride 101 98 - 107 mmol/L    Bicarbonate 30 21 - 32 mmol/L    Anion Gap 16 10 - 20 mmol/L    Urea Nitrogen 54 (H) 6 - 23 mg/dL    Creatinine 3.14 (H) 0.50 - 1.30 mg/dL    eGFR 22 (L) >60 mL/min/1.73m*2    Calcium 8.8 8.6 - 10.6 mg/dL    Phosphorus 5.7 (H) 2.5 - 4.9 mg/dL    Albumin 2.8 (L) 3.4 - 5.0 g/dL   Magnesium   Result Value Ref Range    Magnesium 2.03 1.60 - 2.40 mg/dL   Renal Function Panel   Result Value Ref Range    Glucose 86 74 - 99 mg/dL    Sodium 142 136 - 145 mmol/L    Potassium 4.4 3.5 - 5.3 mmol/L    Chloride 100 98 - 107 mmol/L    Bicarbonate 24 21 - 32 mmol/L    Anion Gap 22 (H) 10 - 20 mmol/L    Urea Nitrogen 57 (H) 6 - 23 mg/dL    Creatinine 3.13 (H) 0.50 - 1.30 mg/dL    eGFR 22 (L) >60 mL/min/1.73m*2    Calcium 8.5 (L) 8.6 - 10.6 mg/dL    Phosphorus 6.0 (H) 2.5 - 4.9 mg/dL    Albumin 2.9 (L) 3.4 - 5.0 g/dL     Imaging results over the past 24 hours reviewed.    US renal complete   Final Result   1. No hydronephrosis or acute abnormality of the kidneys.   2.  Incidentally noted hypoechoic peripancreatic and retroperitoneal   soft tissue lesions likely represent lymphadenopathy which were seen   on the prior CT dated 08/21/2021. Differential diagnosis includes   lymphoma, leukemia, immunosuppression, autoimmune disease, or severe   lymphatic dysfunction.        I personally reviewed the images/study and I agree with the findings   as stated. This study was interpreted at [place], [place].        MACRO:   None.        Signed by: Ron Barrientos 10/4/2023 5:32 PM   Dictation workstation:   [id number]      Transthoracic Echo (TTE) Limited   Final Result      XR chest 1 view   Final Result   1.  Prominent perihilar interstitial markings which can be seen in   setting of pulmonary edema, recommend correlation with patient's   fluid status.   2. Persistent cardiomegaly.        I personally reviewed the images/study and I agree with the findings   as stated. This study was interpreted at [place], [place].        MACRO:   None        Signed by: Evan Finkelstein 10/3/2023 9:18 PM   Dictation workstation:   [id number]            Assessment/Plan   Principal Problem:    Acute on chronic congestive heart failure, unspecified heart failure type (CMS/HCC)    This is a 62/ male with a PMH of HTN, Afib, PE, HLD, HFpEF, and TRACIE, admitted recently for heart failure exacerbation, and is being re-admitted for volume overload again and SG likely related to congestion and volume overload. Compliant with Torsemide 40mg BID. BP is soft and has Afib with controlled rhythm on admission. Admitted for optimization of volume status and home HF medications.    Update 10/6  -[facility] in, currently on bumex drip - increased bumex drip to 2mg/hr. Night team attempted to escalate drip rate beyond 2mg/hr but not indicated per pharmacy.  -Increased APAP to 975 Q8H, will add bowel regimen for constipation reported  on rounds (doc senna & miralax)  -BP meds from home continue to be held.    #Heart failure exacerbation with signs of volume overload  ::Poor response to 80mg IV lasix, needs to get catheter for I&O output. [facility] order placed  ::hs Troponin 33,    ::received 40mg IV lasix on 10/4  ::significant signs of venous congestion on CXR  Plan  -bumex 2mg IV once, starting bumex drip 1mg/hr  -TTE limited ordered  -Held BP meds for very soft blood pressures including:  -Lisinopril 40mg daily - held   -Amlodipine 5mg daily- held  -Hydralazine 50mg TID- held      #SG  ::likely due to congestion and volume overload, current Cr 2.88, baseline 1.2  Plan  -Will keep monitoring BID RFP  -Urine lytes and kidney US ordered      #HLD  -lipid profile ordered  -Atorvastatin resumed      #Afib   -Controlled rate on admission   -Resumed DOAC     #PE in the past (15 years ago)  -On DOAC, indefinite therapy     F: replete PRN  E: replete PRN, K>4 Mg>2  N: regular 2g Na  A: Peripheral IV    DVT PPX: DOAC  GI Ppx: not indicated    Code Status: Full Code  NOK: Extended Emergency Contact Information  Primary Emergency Contact: Emma Calderón  Home Phone: [phone]  Relation: Spouse         Wade MD [place]  Anesthesiology, PGY-1  Pager [phone]

## 2023-10-06 NOTE — CARE PLAN
The patient's goals for the shift include less pain    The clinical goals for the shift include decreased pain, accurate I's and O's    Over the shift, the patient didmake progress toward the following goals.

## 2023-10-07 NOTE — SIGNIFICANT EVENT
RAPID RESPONSE RN note:       10/07/23 1452   Onset Documentation   Rapid Response Initiated By Radar auto page   Location/Room [facility]   Pager Time [phone]   Arrival Time 1503   Event End Time 1504   Level II Called No   Primary Reason for Call Radar auto page  (RADAR score = 6.)     RADAR page received at 14:52. RADAR score = 6. Temp 36.2, , RR 17, BP 93/63, pulse ox 93%. Per bedside nurse, no acute concerns at this time. Vitals at or near baseline. Please page rapid response team for any concerns for deterioration or assistance needed.

## 2023-10-07 NOTE — PROGRESS NOTES
Michael Calderón is a 62 y.o. male on day 3 of admission presenting with Acute on chronic congestive heart failure, unspecified heart failure type (CMS/HCC).    Subjective   Patient seen on rounds having difficulty repositioning himself in bed due to body habitus, team assisted. No acute events overnight, still endorsing cramping.    Objective     Physical Exam  Constitutional:       General: He is not in acute distress.     Appearance: He is obese. He is ill-appearing.   HENT:      Head: Normocephalic and atraumatic.      Mouth/Throat:      Mouth: Mucous membranes are moist.   Eyes:      Extraocular Movements: Extraocular movements intact.      Pupils: Pupils are equal, round, and reactive to light.   Cardiovascular:      Rate and Rhythm: Normal rate and regular rhythm.      Pulses: Normal pulses.      Heart sounds: No murmur heard.     No friction rub. No gallop.      Comments: Heart sounds difficult to appreciate through body habitus  Pulmonary:      Effort: Pulmonary effort is normal. No respiratory distress.      Breath sounds: Normal breath sounds. No stridor. No rhonchi or rales.   Abdominal:      General: Bowel sounds are normal. There is distension.      Palpations: Abdomen is soft.      Comments: Pendulous abdomen   Musculoskeletal:         General: No tenderness. Normal range of motion.      Right lower leg: Edema present.      Left lower leg: Edema present.      Comments: 3+ bilateral lower extremity edema   Skin:     General: Skin is warm and dry.      Coloration: Skin is not jaundiced.   Neurological:      General: No focal deficit present.      Mental Status: He is alert and oriented to person, place, and time.   Psychiatric:         Mood and Affect: Mood normal.         Behavior: Behavior normal.         Last Recorded Vitals  Blood pressure 99/55, pulse (!) 119, temperature 36.5 °C (97.7 °F), resp. rate 20, height 1.829 m (6'), weight (!) 201 kg (442 lb 3.9 oz), SpO2 94 %.  Intake/Output last 3  Shifts:  I/O last 3 completed shifts:  In: 1994.7 (10.1 mL/kg) [P.O.:1680; I.V.:314.7 (1.6 mL/kg)]  Out: 5400 (27.3 mL/kg) [Urine:5400 (0.8 mL/kg/hr)]  Weight: 198.1 kg     Relevant Results  Scheduled medications  acetaminophen, 975 mg, oral, q8h BASILIO  apixaban, 5 mg, oral, q12h  bumetanide, 3 mg, intravenous, Once  metOLazone, 5 mg, oral, Once  polyethylene glycol, 17 g, oral, BID      Continuous medications  bumetanide, 3 mg/hr, Last Rate: 2 mg/hr (10/07/23 0818)    PRN medications  PRN medications: oxygen    Results for orders placed or performed during the hospital encounter of 10/03/23 (from the past 24 hour(s))   Magnesium   Result Value Ref Range    Magnesium 1.98 1.60 - 2.40 mg/dL   Renal Function Panel   Result Value Ref Range    Glucose 100 (H) 74 - 99 mg/dL    Sodium 144 136 - 145 mmol/L    Potassium 4.0 3.5 - 5.3 mmol/L    Chloride 104 98 - 107 mmol/L    Bicarbonate 27 21 - 32 mmol/L    Anion Gap 17 10 - 20 mmol/L    Urea Nitrogen 63 (H) 6 - 23 mg/dL    Creatinine 3.10 (H) 0.50 - 1.30 mg/dL    eGFR 22 (L) >60 mL/min/1.73m*2    Calcium 8.6 8.6 - 10.6 mg/dL    Phosphorus 5.6 (H) 2.5 - 4.9 mg/dL    Albumin 2.6 (L) 3.4 - 5.0 g/dL     Imaging results over the past 24 hours reviewed.    US renal complete   Final Result   1. No hydronephrosis or acute abnormality of the kidneys.   2. Incidentally noted hypoechoic peripancreatic and retroperitoneal   soft tissue lesions likely represent lymphadenopathy which were seen   on the prior CT dated 08/21/2021. Differential diagnosis includes   lymphoma, leukemia, immunosuppression, autoimmune disease, or severe   lymphatic dysfunction.        I personally reviewed the images/study and I agree with the findings   as stated. This study was interpreted at [facility], [place], [place].        MACRO:   None.        Signed by: Ron Barrientos 10/4/2023 5:32 PM   Dictation workstation:   [id number]      Transthoracic Echo (TTE) Limited    Final Result      XR chest 1 view   Final Result   1.  Prominent perihilar interstitial markings which can be seen in   setting of pulmonary edema, recommend correlation with patient's   fluid status.   2. Persistent cardiomegaly.        I personally reviewed the images/study and I agree with the findings   as stated. This study was interpreted at [facility], [place], [place].        MACRO:   None        Signed by: Evan Finkelstein 10/3/2023 9:18 PM   Dictation workstation:   [id number]            Assessment/Plan   Principal Problem:    Acute on chronic congestive heart failure, unspecified heart failure type (CMS/HCC)  Active Problems:    Acute diastolic heart failure with preserved ejection fraction (CMS/HCC)    Atrial fibrillation (CMS/HCC)    Essential hypertension    Volume overload    HLD (hyperlipidemia)    This is a 62/ male with a PMH of HTN, Afib, PE, HLD, HFpEF, and TRACIE, admitted recently for heart failure exacerbation, and is being re-admitted for volume overload again and SG likely related to congestion and volume overload. Compliant with Torsemide 40mg BID. BP is soft and has Afib with controlled rhythm on admission. Admitted for optimization of volume status and home HF medications.    Update 10/7  -[facility] in, currently on bumex drip - increased bumex drip to 3mg/hr.  -added additional bumex 3mg bolus this AM, metolozone 5mg as well.   -BP meds from home continue to be held.    #Heart failure exacerbation with signs of volume overload  ::Poor response to 80mg IV lasix, needs to get catheter for I&O output. [facility] order placed  ::hs Troponin 33,    ::received 40mg IV lasix on 10/4  ::significant signs of venous congestion on CXR  Plan  -bumex 2mg IV once, starting bumex drip 1mg/hr  -TTE limited ordered  -Held BP meds for very soft blood pressures including:  -Lisinopril 40mg daily - held   -Amlodipine 5mg daily- held  -Hydralazine 50mg TID- held       #SG  ::likely due to congestion and volume overload, current Cr 2.88, baseline 1.2  Plan  -Will keep monitoring BID RFP  -Urine lytes and kidney US ordered      #HLD  -lipid profile ordered  -Atorvastatin resumed      #Afib   -Controlled rate on admission   -Resumed DOAC     #PE in the past (15 years ago)  -On DOAC, indefinite therapy     F: replete PRN  E: replete PRN, K>4 Mg>2  N: regular 2g Na  A: Peripheral IV    DVT PPX: DOAC  GI Ppx: not indicated    Code Status: Full Code  NOK: Extended Emergency Contact Information  Primary Emergency Contact: Emma Calderón  Home Phone: [phone]  Relation: Spouse         Wade , MD  Anesthesiology, PGY-1  Pager [phone]

## 2023-10-08 NOTE — CARE PLAN
The patient's goals for the shift include less pain    The clinical goals for the shift include will maintain accurate I's and O's, patient will continue to ambulate as tolerated      Problem: Fall/Injury  Goal: Not fall by end of shift  Outcome: Met  Goal: Be free from injury by end of the shift  Outcome: Met  Goal: Verbalize understanding of personal risk factors for fall in the hospital  Outcome: Met     Problem: Heart Failure  Goal: Improved urinary output this shift  Outcome: Met     Problem: Pain  Goal: Takes deep breaths with improved pain control throughout the shift  Outcome: Met  Goal: Walks with improved pain control throughout the shift  Outcome: Met

## 2023-10-08 NOTE — SIGNIFICANT EVENT
10/08/23 1523   Onset Documentation   Rapid Response Initiated By Radar auto page   [place] [facility] [place] [phone]   Arrival Time 1523   Event End Time 1559   Level II Called No   Primary Reason for Call Radar auto page     Rapid Response RN Note    RADAR score 7 for the following VS:  height is 1.829 m (6') and weight is 201 kg (443 lb 2 oz) (abnormal). His temperature is 36.2 °C (97.2 °F). His blood pressure is 98/64 and his pulse is 121 (abnormal). His respiration is 18 and oxygen saturation is 91%.      Staff to page rapid response for any concerns or acute change in condition/VS.

## 2023-10-08 NOTE — PROGRESS NOTES
Michael Calderón is a 62 y.o. male on day 4 of admission presenting with Acute on chronic congestive heart failure, unspecified heart failure type (CMS/HCC).    Subjective   Patient still complaining of same cough he presented with (likely 2/2 volume overload), denies new SOB, chest pain. No acute events overnight.    Objective     Physical Exam  Constitutional:       General: He is not in acute distress.     Appearance: He is obese. He is ill-appearing.   HENT:      Head: Normocephalic and atraumatic.      Mouth/Throat:      Mouth: Mucous membranes are moist.   Eyes:      Extraocular Movements: Extraocular movements intact.      Pupils: Pupils are equal, round, and reactive to light.   Cardiovascular:      Rate and Rhythm: Normal rate and regular rhythm.      Pulses: Normal pulses.      Heart sounds: No murmur heard.     No friction rub. No gallop.      Comments: Heart sounds difficult to appreciate through body habitus  Pulmonary:      Effort: Pulmonary effort is normal. No respiratory distress.      Breath sounds: Normal breath sounds. No stridor. No rhonchi or rales.   Abdominal:      General: Bowel sounds are normal. There is distension.      Palpations: Abdomen is soft.      Comments: Pendulous abdomen   Musculoskeletal:         General: No tenderness. Normal range of motion.      Right lower leg: Edema present.      Left lower leg: Edema present.      Comments: 2+ bilateral lower extremity edema   Skin:     General: Skin is warm and dry.      Coloration: Skin is not jaundiced.   Neurological:      General: No focal deficit present.      Mental Status: He is alert and oriented to person, place, and time.   Psychiatric:         Mood and Affect: Mood normal.         Behavior: Behavior normal.         Last Recorded Vitals  Blood pressure 111/58, pulse (!) 134, temperature 36.3 °C (97.3 °F), resp. rate 20, height 1.829 m (6'), weight (!) 201 kg (443 lb 2 oz), SpO2 93 %.  Intake/Output last 3 Shifts:  I/O last 3  completed shifts:  In: 240 (1.2 mL/kg) [P.O.:240]  Out: 2601 (12.9 mL/kg) [Urine:2600 (0.4 mL/kg/hr); Stool:1]  Weight: 201 kg     Relevant Results  Scheduled medications  acetaminophen, 975 mg, oral, q8h BASILIO  apixaban, 5 mg, oral, q12h  polyethylene glycol, 17 g, oral, BID      Continuous medications  [Held by provider] bumetanide, 3 mg/hr, Last Rate: 3 mg/hr (10/08/23 0506)    PRN medications  PRN medications: oxygen    Results for orders placed or performed during the hospital encounter of 10/03/23 (from the past 24 hour(s))   Magnesium   Result Value Ref Range    Magnesium 2.05 1.60 - 2.40 mg/dL   Renal Function Panel   Result Value Ref Range    Glucose 94 74 - 99 mg/dL    Sodium 144 136 - 145 mmol/L    Potassium 4.0 3.5 - 5.3 mmol/L    Chloride 102 98 - 107 mmol/L    Bicarbonate 26 21 - 32 mmol/L    Anion Gap 20 10 - 20 mmol/L    Urea Nitrogen 66 (H) 6 - 23 mg/dL    Creatinine 3.68 (H) 0.50 - 1.30 mg/dL    eGFR 18 (L) >60 mL/min/1.73m*2    Calcium 8.6 8.6 - 10.6 mg/dL    Phosphorus 5.1 (H) 2.5 - 4.9 mg/dL    Albumin 2.7 (L) 3.4 - 5.0 g/dL   Renal Function Panel   Result Value Ref Range    Glucose 79 74 - 99 mg/dL    Sodium 145 136 - 145 mmol/L    Potassium 3.7 3.5 - 5.3 mmol/L    Chloride 102 98 - 107 mmol/L    Bicarbonate 27 21 - 32 mmol/L    Anion Gap 20 10 - 20 mmol/L    Urea Nitrogen 62 (H) 6 - 23 mg/dL    Creatinine 3.34 (H) 0.50 - 1.30 mg/dL    eGFR 20 (L) >60 mL/min/1.73m*2    Calcium 8.4 (L) 8.6 - 10.6 mg/dL    Phosphorus 4.6 2.5 - 4.9 mg/dL    Albumin 2.7 (L) 3.4 - 5.0 g/dL     Imaging results over the past 24 hours reviewed.    US renal complete   Final Result   1. No hydronephrosis or acute abnormality of the kidneys.   2. Incidentally noted hypoechoic peripancreatic and retroperitoneal   soft tissue lesions likely represent lymphadenopathy which were seen   on the prior CT dated 08/21/2021. Differential diagnosis includes   lymphoma, leukemia, immunosuppression, autoimmune disease, or severe    lymphatic dysfunction.        I personally reviewed the images/study and I agree with the findings   as stated. This study was interpreted at [place], [place].        MACRO:   None.        Signed by: Ron Barrientos 10/4/2023 5:32 PM   Dictation workstation:   [id number]      Transthoracic Echo (TTE) Limited   Final Result      XR chest 1 view   Final Result   1.  Prominent perihilar interstitial markings which can be seen in   setting of pulmonary edema, recommend correlation with patient's   fluid status.   2. Persistent cardiomegaly.        I personally reviewed the images/study and I agree with the findings   as stated. This study was interpreted at [place], [place].        MACRO:   None        Signed by: Evan Finkelstein 10/3/2023 9:18 PM   Dictation workstation:   [id number]          Assessment/Plan   Principal Problem:    Acute on chronic congestive heart failure, unspecified heart failure type (CMS/HCC)  Active Problems:    Acute diastolic heart failure with preserved ejection fraction (CMS/HCC)    Atrial fibrillation (CMS/HCC)    Essential hypertension    Volume overload    HLD (hyperlipidemia)    This is a 62/ male with a PMH of HTN, Afib, PE, HLD, HFpEF, and TRACIE, admitted recently for heart failure exacerbation, and is being re-admitted for volume overload again and SG likely related to congestion and volume overload. Compliant with Torsemide 40mg BID. BP is soft and has Afib with controlled rhythm on admission. Admitted for optimization of volume status and home HF medications.    Update 10/8  -[facility] in, holding bumex for today due to Cr bump to 3.68 last night, 3.34 this AM in setting of new-onset sinus tach.  -edema appears improved on physical exam but still complaining of cough so likely still volume up. Will restart diuresis tomorrow.  -BP meds from home continue to be held.    #Heart failure  exacerbation with signs of volume overload  ::Poor response to 80mg IV lasix, needs to get catheter for I&O output. Starkey order placed  ::hs Troponin 33,    ::received 40mg IV lasix on 10/4  ::significant signs of venous congestion on CXR  Plan  -bumex 2mg IV once, starting bumex drip 1mg/hr  -TTE limited ordered  -Held BP meds for very soft blood pressures including:  -Lisinopril 40mg daily - held   -Amlodipine 5mg daily- held  -Hydralazine 50mg TID- held      #SG  ::likely due to congestion and volume overload, current Cr 2.88, baseline 1.2  Plan  -Will keep monitoring BID RFP  -Urine lytes and kidney US ordered      #HLD  -lipid profile ordered  -Atorvastatin resumed      #Afib   -Controlled rate on admission   -Resumed DOAC     #PE in the past (15 years ago)  -On DOAC, indefinite therapy     F: replete PRN  E: replete PRN, K>4 Mg>2  N: regular 2g Na  A: Peripheral IV    DVT PPX: DOAC  GI Ppx: not indicated    Code Status: Full Code  NOK: Extended Emergency Contact Information  Primary Emergency Contact: Emma Calderón  Home Phone: [phone]  Relation: Spouse         Wadesarah Bailey MD  Anesthesiology, PGY-1  Pager [phone]

## 2023-10-08 NOTE — CARE PLAN
The patient's goals for the shift include decreased shortness of breath    The clinical goals for the shift include no worsening respiratory symptoms this shift    Over the shift, the patient did make progress toward the following goals. Pt remains stable throughout the shift. HR elevated, team aware and no concerns at this time. No signs of cardiac or respiratory distress. No signs of infection. Starkey remains patent and I&O's or accounted from.

## 2023-10-09 NOTE — PROGRESS NOTES
Michael Calderón is a 63 y.o. male on day 5 of admission presenting with Acute on chronic congestive heart failure, unspecified heart failure type (CMS/HCC).    Subjective   Patient laying in bed. Overnight, patient was in Afib with RVR -130s and asymptmatic. He was started on metoprolol tartrate 12.5 mg Q6. Reports he does not have any cramping today but continues to have a cough. Patient denies HA, SOB, CP, NVD, abdominal pain, dysuria, hematuria. He has decreased in weight by 4 pounds since yesterday.      Objective     Physical Exam  Constitutional:       General: He is not in acute distress.     Appearance: He is obese.   HENT:      Head: Normocephalic and atraumatic.      Right Ear: External ear normal.      Left Ear: External ear normal.      Nose: Nose normal.      Mouth/Throat:      Mouth: Mucous membranes are moist.   Eyes:      Extraocular Movements: Extraocular movements intact.      Conjunctiva/sclera: Conjunctivae normal.      Pupils: Pupils are equal, round, and reactive to light.   Cardiovascular:      Rate and Rhythm: Normal rate and regular rhythm.      Pulses: Normal pulses.      Heart sounds: Normal heart sounds.      Comments: HJR present  Pulmonary:      Effort: Pulmonary effort is normal.      Breath sounds: Normal breath sounds. No stridor. No wheezing, rhonchi or rales.   Abdominal:      General: Abdomen is flat. Bowel sounds are normal. There is no distension.      Palpations: Abdomen is soft.      Tenderness: There is no abdominal tenderness. There is no right CVA tenderness, left CVA tenderness or guarding.   Musculoskeletal:         General: Normal range of motion.      Right lower leg: Edema present.      Left lower leg: Edema present.   Skin:     General: Skin is warm and dry.      Capillary Refill: Capillary refill takes less than 2 seconds.      Comments: Chronic venous stasis changes in bilateral LE   Neurological:      General: No focal deficit present.      Mental Status: He  is alert and oriented to person, place, and time.   Psychiatric:         Mood and Affect: Mood normal.         Behavior: Behavior normal.         Last Recorded Vitals  Blood pressure 122/74, pulse (!) 119, temperature 36.1 °C (97 °F), resp. rate 18, height 1.829 m (6'), weight (!) 199 kg (437 lb 13.3 oz), SpO2 93 %.  Intake/Output last 3 Shifts:  I/O last 3 completed shifts:  In: 640 (3.2 mL/kg) [P.O.:640]  Out: 5201 (26.2 mL/kg) [Urine:5200 (0.7 mL/kg/hr); Stool:1]  Weight: 198.6 kg       This patient has a urinary catheter   Reason for the urinary catheter remaining today? critically ill patient who need accurate urinary output measurements      Assessment/Plan   Principal Problem:    Acute on chronic congestive heart failure, unspecified heart failure type (CMS/HCC)  Active Problems:    Acute diastolic heart failure with preserved ejection fraction (CMS/HCC)    Atrial fibrillation (CMS/HCC)    Essential hypertension    Volume overload    HLD (hyperlipidemia)    This is a 62/ male with a PMH of HTN, Afib, PE, HLD, HFpEF, and TRACIE, admitted recently for heart failure exacerbation, and is being re-admitted for volume overload again and SG likely related to congestion and volume overload. Compliant with Torsemide 40mg BID. BP is soft and has Afib with controlled rhythm on admission. Admitted for optimization of volume status and home HF medications.     Update 10/9  - Bumex 4 mg given; will follow up I/O response and PM RFP and consider PM Bumex 4 mg  - HJR on PE today  -BP meds from home continue to be held.     #Heart failure exacerbation with signs of volume overload  ::Poor response to 80mg IV lasix, needs to get catheter for I&O output. Starkey order placed  ::hs Troponin 33,    ::received 40mg IV lasix on 10/4  ::significant signs of venous congestion on CXR    Plan  - Bumex 4 mg today; consider additional PM Bumex 4 mg if responsive and RFP WNL  -Held BP meds for very soft blood pressures  including:  -Lisinopril 40mg daily - held   -Amlodipine 5mg daily- held  -Hydralazine 50mg TID- held      #SG  ::likely due to congestion and volume overload, current Cr 2.88, baseline 1.2  :: UA: negative nitrite, small LE    Plan  -Will keep monitoring BID RFP  -Urine lytes and kidney US ordered      #HLD  -lipid profile ordered  -Atorvastatin resumed      #Afib   -Controlled rate on admission   -Resumed DOAC     #PE in the past (15 years ago)  -On DOAC, indefinite therapy     F: replete PRN  E: replete PRN, K>4 Mg>2  N: regular 2g Na  A: Peripheral IV     DVT PPX: DOAC  GI Ppx: not indicated     Code Status: Full Code  NOK: Extended Emergency Contact Information  Primary Emergency Contact: KaitlynnEmma  Home Phone: [phone]  Relation: Spouse            Krista Norris MD

## 2023-10-09 NOTE — CARE PLAN
The patient's goals for the shift include less pain    The clinical goals for the shift include no increased Shortnes sof breaht this shift    Over the shift, the patient did make progress toward the following goals.   Problem: Fall/Injury  Goal: Verbalize understanding of risk factor reduction measures to prevent injury from fall in the home  10/8/2023 2345 by Ros Steel RN  Outcome: Not Progressing  10/8/2023 1948 by Ros Steel RN  Outcome: Progressing  Goal: Use assistive devices by end of the shift  10/8/2023 2345 by Ros Steel RN  Outcome: Not Progressing  10/8/2023 1948 by Ros Steel RN  Outcome: Progressing  Goal: Pace activities to prevent fatigue by end of the shift  10/8/2023 2345 by Ros Steel RN  Outcome: Not Progressing  10/8/2023 1948 by Ros Steel RN  Outcome: Progressing     Problem: Heart Failure  Goal: Improved gas exchange this shift  10/8/2023 2345 by Ros Steel RN  Outcome: Not Progressing  10/8/2023 1948 by Ros Steel RN  Outcome: Progressing  Goal: Reduction in peripheral edema within 24 hours  10/8/2023 2345 by Ros Steel RN  Outcome: Not Progressing  10/8/2023 1948 by Ros Steel RN  Outcome: Progressing  Goal: Report improvement of dyspnea/breathlessness this shift  10/8/2023 2345 by Ros Steel RN  Outcome: Not Progressing  10/8/2023 1948 by Ros Steel RN  Outcome: Progressing  Goal: Weight from fluid excess reduced over 2-3 days, then stabilize  10/8/2023 2345 by Ros Steel RN  Outcome: Not Progressing  10/8/2023 1948 by Ros Steel RN  Outcome: Progressing  Goal: Increase self care and/or family involvement in 24 hours  10/8/2023 2345 by Ros Steel RN  Outcome: Not Progressing  10/8/2023 1948 by Ros Steel RN  Outcome: Progressing     Problem: Pain  Goal: Turns in bed with improved pain control throughout the shift  10/8/2023 2345 by Ros Steel RN  Outcome: Not Progressing  10/8/2023 1948 by Ros Steel  RN  Outcome: Progressing  Goal: Performs ADL's with improved pain control throughout shift  10/8/2023 2345 by Ros Steel RN  Outcome: Not Progressing  10/8/2023 1948 by Ros Steel RN  Outcome: Progressing  Goal: Participates in PT with improved pain control throughout the shift  10/8/2023 2345 by Ros Steel RN  Outcome: Not Progressing  10/8/2023 1948 by Ros Steel RN  Outcome: Progressing  Goal: Free from opioid side effects throughout the shift  10/8/2023 2345 by Ros Steel RN  Outcome: Not Progressing  10/8/2023 1948 by Ros Steel RN  Outcome: Progressing  Goal: Free from acute confusion related to pain meds throughout the shift  10/8/2023 2345 by Ros Steel RN  Outcome: Not Progressing  10/8/2023 1948 by Ros Stele RN  Outcome: Progressing     Problem: Pain - Adult  Goal: Verbalizes/displays adequate comfort level or baseline comfort level  Outcome: Not Progressing     Problem: Safety - Adult  Goal: Free from fall injury  Outcome: Not Progressing     Problem: Discharge Planning  Goal: Discharge to home or other facility with appropriate resources  Outcome: Not Progressing     Problem: Chronic Conditions and Co-morbidities  Goal: Patient's chronic conditions and co-morbidity symptoms are monitored and maintained or improved  Outcome: Not Progressing

## 2023-10-09 NOTE — SIGNIFICANT EVENT
Rapid Response RN Note     10/08/23 2352   Onset Documentation   Rapid Response Initiated By Radar auto page   [place] [facility] [id number]   Pager Time [phone]   Arrival Time 2352   Event End Time 0017   Primary Reason for Call Radar auto page     Rapid response RN at bedside for RADAR score 6 for the following VS: T 36.5 °Celsius;  ; RR 20; /58; SPO2 93%.    Reviewed above VS with bedside RN.  Patient denied pain, shortness of breath, dizziness or lightheadedness.  Endorsed frequent cough which patient stated was the reason for seeking care and hospital admission.  Dr. Charli Amaya, [facility]  VS at 0015: ; /66.    Collaborative plan of care:  Oral Metoprolol as ordered  Staff to page rapid response for any concerns or acute change in condition/VS

## 2023-10-09 NOTE — SIGNIFICANT EVENT
Called for Afib w/ RVR w/ rates in 120-130s. Pt asymptomatic, resting in bed, all other vitals stable. /60s. Pt has been in the 100s throughout the day and has no rate control on file. I started 12.5 mg PO metoprolol tartate Q6H.

## 2023-10-10 NOTE — CARE PLAN
The patient's goals for the shift include less pain    The clinical goals for the shift include pt gisselle ambulate to the bathroom throughout the shift    Over the shift, the patient did not make progress toward the following goals. Barriers to progression include limited mobility. Recommendations to address these barriers include continued encouragement to ambulate while monitoring patient's HR.

## 2023-10-10 NOTE — PROGRESS NOTES
Michael Calderón is a 63 y.o. male on day 6 of admission presenting with Acute on chronic congestive heart failure, unspecified heart failure type (CMS/HCC).    Subjective   Patient laying in bed. Patient remains in Afib per tele, on metoprolol. Reports he does not have any cramping today and feels like his breathing is subjectively the same as yesterday. Patient denies HA, SOB, CP, NVD, abdominal pain, dysuria, hematuria. He has decreased in weight by 4 pounds since 10/09.    Objective     Physical Exam  Constitutional:       General: He is not in acute distress.     Appearance: He is obese.   HENT:      Head: Normocephalic and atraumatic.      Right Ear: External ear normal.      Left Ear: External ear normal.      Nose: Nose normal.      Mouth/Throat:      Mouth: Mucous membranes are moist.   Eyes:      Extraocular Movements: Extraocular movements intact.      Conjunctiva/sclera: Conjunctivae normal.      Pupils: Pupils are equal, round, and reactive to light.   Cardiovascular:      Rate and Rhythm: Normal rate and regular rhythm.      Pulses: Normal pulses.      Heart sounds: Normal heart sounds.      Comments: HJR present  Pulmonary:      Effort: Pulmonary effort is normal.      Breath sounds: Normal breath sounds. No stridor. No wheezing, rhonchi or rales.   Abdominal:      General: Abdomen is flat. Bowel sounds are normal. There is no distension.      Palpations: Abdomen is soft.      Tenderness: There is no abdominal tenderness. There is no right CVA tenderness, left CVA tenderness or guarding.   Musculoskeletal:         General: Normal range of motion.      Right lower leg: Edema present.      Left lower leg: Edema present.   Skin:     General: Skin is warm and dry.      Capillary Refill: Capillary refill takes less than 2 seconds.      Comments: Chronic venous stasis changes in bilateral LE   Neurological:      General: No focal deficit present.      Mental Status: He is alert and oriented to person, place,  and time.   Psychiatric:         Mood and Affect: Mood normal.         Behavior: Behavior normal.         Last Recorded Vitals  Blood pressure 125/72, pulse 82, temperature 36.1 °C (97 °F), resp. rate 17, height 1.829 m (6'), weight (!) 197 kg (433 lb 6.8 oz), SpO2 92 %.  Intake/Output last 3 Shifts:  I/O last 3 completed shifts:  In: 1640 (8.3 mL/kg) [P.O.:1640]  Out: 3251 (16.5 mL/kg) [Urine:3250 (0.5 mL/kg/hr); Stool:1]  Weight: 196.6 kg       This patient has a urinary catheter   Reason for the urinary catheter remaining today? critically ill patient who need accurate urinary output measurements      Assessment/Plan   Principal Problem:    Acute on chronic congestive heart failure, unspecified heart failure type (CMS/HCC)  Active Problems:    Acute diastolic heart failure with preserved ejection fraction (CMS/HCC)    Atrial fibrillation (CMS/HCC)    Essential hypertension    Volume overload    HLD (hyperlipidemia)    This is a 62/ male with a PMH of HTN, Afib, PE, HLD, HFpEF, and TRACIE, admitted recently for heart failure exacerbation, and is being re-admitted for volume overload again and SG likely related to congestion and volume overload. Compliant with Torsemide 40mg BID. BP is soft and has Afib with controlled rhythm on admission. Admitted for optimization of volume status and home HF medications.     Update 10/10  -Still holding bumex drip, will continue to dose PRN with bumex 4mg. Two doses of 4mg slated for today.  -BP meds from home continue to be held.     #Heart failure exacerbation with signs of volume overload  ::Poor response to 80mg IV lasix, needs to get catheter for I&O output. Starkey order placed  ::hs Troponin 33,    ::received 40mg IV lasix on 10/4  ::significant signs of venous congestion on CXR    Plan  - Bumex 4 mg today; consider additional PM Bumex 4 mg if responsive and RFP WNL  -Held BP meds for very soft blood pressures including:  -Lisinopril 40mg daily - held   -Amlodipine 5mg  daily- held  -Hydralazine 50mg TID- held      #SG  ::likely due to congestion and volume overload, current Cr 2.88, baseline 1.2  :: UA: negative nitrite, small LE    Plan  -Will keep monitoring BID RFP  -Urine lytes and kidney US ordered      #HLD  -lipid profile ordered  -Atorvastatin resumed      #Afib   -Controlled rate on admission   -Resumed DOAC     #PE in the past (15 years ago)  -On DOAC, indefinite therapy     F: replete PRN  E: replete PRN, K>4 Mg>2  N: regular 2g Na  A: Peripheral IV     DVT PPX: DOAC  GI Ppx: not indicated     Code Status: Full Code  NOK: Extended Emergency Contact Information  Primary Emergency Contact: Emma Calderón  Home Phone: [phone]  Relation: Spouse     Wade HigginsMD sy  Anesthesiology, PGY-1  Pager [phone]

## 2023-10-10 NOTE — PROGRESS NOTES
Physical Therapy                 Therapy Communication Note    Patient Name: Michael Calderón  MRN: [id number]  Today's Date: 10/10/2023     Discipline: Physical Therapy    Missed Visit Reason: Missed Visit Reason:  (pt refused, stated he just got back in bed and feels tired)

## 2023-10-11 NOTE — SIGNIFICANT EVENT
Rapid Response Note    Code white called 16:34 for new hypotension 68/34, .     Briefly, 63 y.o. M admitted for HFpEF exacerbation and SG. Also with known Afib that has been rhythm and rate controlled (average HR 70s-80s). Have been diuresing with bumex 4 mg PO BID. Today, added metoprolol 25 mg q6h (only received 1 dose 4 hours prior to the rapid). On arrival, pt resting in bed in some distress. Notes that he got up to use the bathroom. Had a BM and urinated, but was not straining. Was able to get back into bed when he suddenly felt dizzy. Also endorsing abdominal pain and subjective shortness of breath. Diaphoretic. Skin warm. Denying chest pain or pain elsewhere.     Initial BP 68/34 -> 80s/60s -> systolic low 100s without intervention. EKG with Afib with RVR with . Vitals machine showed desaturation to 71% that corrected to 95% prior to supplemental oxygen. 2L NC started per pt request for subjective shortness of breath after O2 sat corrected.     Plan - Believe hypotension is iso aggressive diuresis over past few days. No c/f tamponade physiology.   -CXR for new O2 requirement  -KUB for worsening abdominal pain  -VBG to ensure normal lactate given sudden worsening abdominal pain  -500 mL LR -> check orthostats after  -holding 2nd bumex 4 mg dose  -holding metop 50 mg q6h    Of note, as pt was in Afib prior to the rapid response being called, despite hypotension, would not treat this as unstable Afib and cardiovert overnight. Also, if pt becomes hypotensive again and is unresponsive to fluids, consider bedside POCUS to ensure effusion is not worsening tamponade.

## 2023-10-11 NOTE — SIGNIFICANT EVENT
RAPID RESPONSE RN NOTE    Rapid Response page received for hypotensive patient.  Upon arrival to room patient found lying in bed, cool and clammy, complaining of dizziness, mentating appropriately- Nursing at bedside obtaining 12 lead ECG.  Per bedside RN Lo the patient had safely ambulated to bathroom and back to bed. He was then sitting on the edge of bed and began complaining of dizziness and was diaphoretic.  Blood pressure on dynamap (right UA) 68/34.  Manual pressure (right UA) 82/62.  Primary team [facility] to bedside - per team the patient has been receiving aggressive diuresis therapy and his Metoprolol was reintroduced today.  Manual blood pressure retaken -SBP>90. Patient verbalizing he is no longer dizzy and is feeling better.  Additional IV access placed, fluid bolus 500 mls/2 hours initiated as ordered.  Orders for chest xray and KUB to be placed, VBG to be obtained by nursing staff upon completion of bolus.    Team to consider placing patient on diuretic holiday, patient to remain on division at this time.  Bedside RN to contact Rapid Response Team with any further concerns or patient deterioration.

## 2023-10-11 NOTE — PROGRESS NOTES
Michael Calderón is a 63 y.o. male on day 7 of admission presenting with Acute on chronic congestive heart failure, unspecified heart failure type (CMS/HCC).    Subjective   Patient laying in bed. Patient remains in Afib per tele, on metoprolol. Reports he does not have any cramping today and feels like his breathing is subjectively the same as yesterday. Patient denies HA, SOB, CP, NVD, abdominal pain, dysuria, hematuria. Endorses abdominal fullness, continues to endorse cough.    Objective     Physical Exam  Constitutional:       General: He is not in acute distress.     Appearance: He is obese.   HENT:      Head: Normocephalic and atraumatic.      Right Ear: External ear normal.      Left Ear: External ear normal.      Nose: Nose normal.      Mouth/Throat:      Mouth: Mucous membranes are moist.   Eyes:      Extraocular Movements: Extraocular movements intact.      Conjunctiva/sclera: Conjunctivae normal.      Pupils: Pupils are equal, round, and reactive to light.   Cardiovascular:      Rate and Rhythm: Normal rate and regular rhythm.      Pulses: Normal pulses.      Heart sounds: Normal heart sounds.      Comments: HJR present  Pulmonary:      Effort: Pulmonary effort is normal.      Breath sounds: Normal breath sounds. No stridor. No wheezing, rhonchi or rales.   Abdominal:      General: Abdomen is flat. Bowel sounds are normal. There is no distension.      Palpations: Abdomen is soft.      Tenderness: There is no abdominal tenderness. There is no right CVA tenderness, left CVA tenderness or guarding.   Musculoskeletal:         General: Normal range of motion.      Right lower leg: Edema present.      Left lower leg: Edema present.   Skin:     General: Skin is warm and dry.      Capillary Refill: Capillary refill takes less than 2 seconds.      Comments: Chronic venous stasis changes in bilateral LE   Neurological:      General: No focal deficit present.      Mental Status: He is alert and oriented to person,  place, and time.   Psychiatric:         Mood and Affect: Mood normal.         Behavior: Behavior normal.         Last Recorded Vitals  Blood pressure 131/77, pulse 68, temperature 36.1 °C (97 °F), resp. rate 22, height 1.829 m (6'), weight (!) 197 kg (433 lb 6.8 oz), SpO2 92 %.  Intake/Output last 3 Shifts:  I/O last 3 completed shifts:  In: 240 (1.2 mL/kg) [P.O.:240]  Out: 2875 (14.6 mL/kg) [Urine:2875 (0.4 mL/kg/hr)]  Weight: 196.6 kg       This patient has a urinary catheter   Reason for the urinary catheter remaining today? critically ill patient who need accurate urinary output measurements      Assessment/Plan   Principal Problem:    Acute on chronic congestive heart failure, unspecified heart failure type (CMS/HCC)  Active Problems:    Acute diastolic heart failure with preserved ejection fraction (CMS/HCC)    Atrial fibrillation (CMS/HCC)    Essential hypertension    Volume overload    HLD (hyperlipidemia)    This is a 61yo male with a PMH of HTN, Afib, PE, HLD, HFpEF, and TRACIE, admitted recently for heart failure exacerbation, and is being re-admitted for volume overload again and SG likely related to congestion and volume overload. Compliant with Torsemide 40mg BID. BP is soft and has Afib with controlled rhythm on admission. Admitted for optimization of volume status and home HF medications.     Update 10/11  -Still holding bumex drip, will continue to dose PRN with bumex 4mg. Two doses of 4mg slated for today. Abdominal fullness patient continues to endorse most likely abdominal edema 2/2 volume overload.  -Increased metoprolol to better control rates in setting of tachycardia, now metoprolol tartrate 25mg Q6H  -BP meds from home continue to be held.  -Trial of void today     #Heart failure exacerbation with signs of volume overload  ::Poor response to 80mg IV lasix, needs to get catheter for I&O output. Starkey order placed  ::hs Troponin 33,    ::received 40mg IV lasix on 10/4  ::significant signs  of venous congestion on CXR    Plan  -Held BP meds for very soft blood pressures including:  -Lisinopril 40mg daily - held   -Amlodipine 5mg daily- held  -Hydralazine 50mg TID- held      #SG, cardiorenal  ::likely due to congestion and volume overload, current Cr 2.88, baseline 1.2  :: UA: negative nitrite, small LE  Plan  -Will keep monitoring BID RFP  -Urine lytes and kidney US ordered, unremarkable  -likely cardiorenal, will keep diuresing     #HLD  -lipid profile ordered  -Atorvastatin resumed      #Afib   -Controlled rate on admission   -Resumed DOAC     #PE in the past (15 years ago)  -On DOAC, indefinite therapy     F: replete PRN  E: replete PRN, K>4 Mg>2  N: regular 2g Na  A: Peripheral IV     DVT PPX: DOAC  GI Ppx: not indicated     Code Status: Full Code  NOK: Extended Emergency Contact Information  Primary Emergency Contact: Emma Calderón  Home Phone: [phone]  Relation: Spouse     Wadesarah Bailey MD  Anesthesiology, PGY-1  Pager [phone]

## 2023-10-11 NOTE — CARE PLAN
The patient's goals for the shift include less pain    The clinical goals for the shift include pt will ambulate to the bathroom and pass the void trial    Over the shift, the patient did not make progress toward the following goals. Barriers to progression include lack of ambulation. Recommendations to address these barriers include continued encouragement to ambulate while monitoring HR.

## 2023-10-11 NOTE — SIGNIFICANT EVENT
RAPID RESPONSE RN NOTE    RADAR auto page received as a result of VS being entered into the EMR.  Upon arrival to room patient resting comfortably in bed, denies SOB/pain.    Discussed VS and POC with bedside RN Lo - no concerns from nursing at this time.    Bedside RN to contact Rapid Response Team with any further issues/patient deterioration.

## 2023-10-12 NOTE — CARE PLAN
The patient's goals for the shift include less pain    The clinical goals for the shift include pt will ambulate to the bathroom and pass the void trial    Problem: Pain  Goal: My pain/discomfort is manageable  Outcome: Met     Problem: Safety  Goal: Patient will be injury free during hospitalization  Outcome: Met     Problem: Daily Care  Goal: Daily care needs are met  Outcome: Met     Problem: Psychosocial Needs  Goal: Demonstrates ability to cope with hospitalization/illness  Outcome: Met

## 2023-10-12 NOTE — PROGRESS NOTES
Michael Calderón is a 63 y.o. male on day 8 of admission presenting with Acute on chronic congestive heart failure, unspecified heart failure type (CMS/HCC).    Subjective   Patient laying in bed. Patient remains in afib, holding metoprolol per yesterday's rapid response. Reports he does not have any cramping today and feels like his breathing and cough slightly improved, on 2.5L oxygen. Patient denies HA, SOB, CP, NVD, abdominal pain, dysuria, hematuria. Endorses abdominal fullness, stated it improved somewhat yesterday after a large bowel movement (x4 Bms, mixture of formed and soft stool).     Objective     Physical Exam  Constitutional:       General: He is not in acute distress.     Appearance: He is obese.   HENT:      Head: Normocephalic and atraumatic.      Right Ear: External ear normal.      Left Ear: External ear normal.      Nose: Nose normal.      Mouth/Throat:      Mouth: Mucous membranes are moist.   Eyes:      Extraocular Movements: Extraocular movements intact.      Conjunctiva/sclera: Conjunctivae normal.      Pupils: Pupils are equal, round, and reactive to light.   Cardiovascular:      Rate and Rhythm: Normal rate and regular rhythm.      Pulses: Normal pulses.      Heart sounds: Normal heart sounds.      Comments: HJR present  Pulmonary:      Effort: Pulmonary effort is normal.      Breath sounds: Normal breath sounds. No stridor. No wheezing, rhonchi or rales.   Abdominal:      General: Abdomen is flat. Bowel sounds are normal. There is distension.      Palpations: Abdomen is soft.      Tenderness: There is no abdominal tenderness. There is no right CVA tenderness, left CVA tenderness or guarding.   Musculoskeletal:         General: Normal range of motion.      Right lower leg: Edema present.      Left lower leg: Edema present.   Skin:     General: Skin is warm and dry.      Capillary Refill: Capillary refill takes less than 2 seconds.      Comments: Chronic venous stasis changes in bilateral LE    Neurological:      General: No focal deficit present.      Mental Status: He is alert and oriented to person, place, and time.   Psychiatric:         Mood and Affect: Mood normal.         Behavior: Behavior normal.         Last Recorded Vitals  Blood pressure 125/65, pulse 73, temperature 36.3 °C (97.3 °F), resp. rate 18, height 1.829 m (6'), weight (!) 195 kg (428 lb 12.7 oz), SpO2 100 %.  Intake/Output last 3 Shifts:  I/O last 3 completed shifts:  In: 100 (0.5 mL/kg) [P.O.:100]  Out: 1977 (10.1 mL/kg) [Urine:1975 (0.3 mL/kg/hr); Stool:2]  Weight: 196.6 kg     Assessment/Plan   Principal Problem:    Acute on chronic congestive heart failure, unspecified heart failure type (CMS/HCC)  Active Problems:    Acute diastolic heart failure with preserved ejection fraction (CMS/HCC)    Atrial fibrillation (CMS/HCC)    Essential hypertension    Volume overload    HLD (hyperlipidemia)    This is a 61yo male with a PMH of HTN, Afib, PE, HLD, HFpEF, and TRACIE, admitted recently for heart failure exacerbation, and is being re-admitted for volume overload again and SG likely related to congestion and volume overload. Compliant with Torsemide 40mg BID. BP is soft and has Afib with controlled rhythm on admission. Admitted for optimization of volume status and home HF medications.     Update 10/12  -Rapid response yesterday evening 1630, came to bedside with BP 68/34 and , repeat manual BP cuff demonstrated 80s/60s, 100s/80s approximately 3min later. Patient was in afib with RVR for past several days and remained in RVR during this episode. Unlikely that RVR contributed to hemodynamic instability, more likely episode was caused by increase in vagal tone complicated by patient's ongoing abdominal edema. Likely also related to patient's intravascular volume depletion due to aggressive diuresis  -Will give diuresis holiday today  -BP meds from home continue to be held  -Trial of void today     #Heart failure exacerbation with  signs of volume overload  ::Poor response to 80mg IV lasix, needs to get catheter for I&O output. Starkey order placed  ::hs Troponin 33,    ::received 40mg IV lasix on 10/4  ::significant signs of venous congestion on CXR    Plan  -Held BP meds for very soft blood pressures including:  -Lisinopril 40mg daily - held   -Amlodipine 5mg daily- held  -Hydralazine 50mg TID- held      #SG, cardiorenal  ::likely due to congestion and volume overload, current Cr 2.88, baseline 1.2  :: UA: negative nitrite, small LE  Plan  -Will keep monitoring BID RFP  -Urine lytes and kidney US ordered, unremarkable  -likely cardiorenal, will keep diuresing     #HLD  -lipid profile ordered  -Atorvastatin resumed      #Afib   -Controlled rate on admission   -Resumed DOAC     #PE in the past (15 years ago)  -On DOAC, indefinite therapy     F: replete PRN  E: replete PRN, K>4 Mg>2  N: regular 2g Na  A: Peripheral IV     DVT PPX: DOAC  GI Ppx: not indicated     Code Status: Full Code  NOK: Extended Emergency Contact Information  Primary Emergency Contact: Emma Calderón  Home Phone: [phone]  Relation: Spouse     Wadesarah Bailey MD  Anesthesiology, PGY-1  Pager [phone]

## 2023-10-13 NOTE — CARE PLAN
Problem: Mobility  Goal: STG -  Patient will ambulate 75 feet x2 with LRD modified independent  Outcome: Progressing  Goal: STG - Patient will ascend and descend 3 stairs independent  Outcome: Progressing     Problem: Transfers  Goal: STG - Patient will transfer sit to and from stand with LRD Modified independent  Outcome: Progressing

## 2023-10-13 NOTE — PROGRESS NOTES
Michael Calderón is a 63 y.o. male on day 9 of admission presenting with Acute on chronic congestive heart failure, unspecified heart failure type (CMS/HCC).    Subjective   Patient seated comfortably at the edge of the bed with no acute events overnight. He feels improved from yesterday with his diuretetic holiday and denies any CP, SOB or cramping. He does continues to have some lightheadedness when he is sitting up.        Objective     Physical Exam  Constitutional:       General: He is not in acute distress.     Appearance: He is obese.   HENT:      Head: Normocephalic and atraumatic.      Right Ear: External ear normal.      Left Ear: External ear normal.      Nose: Nose normal.      Mouth/Throat:      Mouth: Mucous membranes are moist.   Eyes:      Extraocular Movements: Extraocular movements intact.      Conjunctiva/sclera: Conjunctivae normal.      Pupils: Pupils are equal, round, and reactive to light.   Cardiovascular:      Rate and Rhythm: Normal rate and regular rhythm.      Pulses: Normal pulses.      Heart sounds: Normal heart sounds.   Pulmonary:      Effort: Pulmonary effort is normal.      Breath sounds: Normal breath sounds. No stridor. No wheezing, rhonchi or rales.   Abdominal:      General: Abdomen is flat. Bowel sounds are normal. There is no distension.      Palpations: Abdomen is soft.      Tenderness: There is no abdominal tenderness. There is no right CVA tenderness, left CVA tenderness or guarding.   Musculoskeletal:         General: Normal range of motion.      Right lower leg: Edema present.      Left lower leg: Edema present.   Skin:     General: Skin is warm.      Capillary Refill: Capillary refill takes less than 2 seconds.   Neurological:      General: No focal deficit present.      Mental Status: He is alert and oriented to person, place, and time.   Psychiatric:         Mood and Affect: Mood normal.         Behavior: Behavior normal.         Last Recorded Vitals  Blood pressure  114/63, pulse 101, temperature 36.4 °C (97.5 °F), resp. rate 18, height 1.829 m (6'), weight (!) 195 kg (428 lb 12.7 oz), SpO2 96 %.  Intake/Output last 3 Shifts:  I/O last 3 completed shifts:  In: 500 (2.6 mL/kg) [P.O.:500]  Out: 606 (3.1 mL/kg) [Urine:600 (0.1 mL/kg/hr); Stool:6]  Weight: 194.5 kg     Assessment/Plan   Principal Problem:    Acute on chronic congestive heart failure, unspecified heart failure type (CMS/HCC)  Active Problems:    Acute diastolic heart failure with preserved ejection fraction (CMS/HCC)    Atrial fibrillation (CMS/HCC)    Essential hypertension    Volume overload    HLD (hyperlipidemia)    This is a 61yo male with a PMH of HTN, Afib, PE, HLD, HFpEF, and TRACIE, admitted recently for heart failure exacerbation, and is being re-admitted for volume overload again and SG likely related to congestion and volume overload. Compliant with Torsemide 40mg BID. BP is soft and has Afib with controlled rhythm on admission. Admitted for optimization of volume status and home HF medications.     UPDATE 10/13  - Bumex 4 mg BID today  -BP meds from home continue to be held    #Heart failure exacerbation with signs of volume overload  ::hs Troponin 33,    ::received 40mg IV lasix on 10/4  ::significant signs of venous congestion on CXR     Plan  - Bumex 4 mg restarted  -Held BP meds for very soft blood pressures including:  -Lisinopril 40mg daily - held   -Amlodipine 5mg daily- held  -Hydralazine 50mg TID- held      #SG, cardiorenal  ::likely due to congestion and volume overload, current Cr 2.88, baseline 1.2  :: UA: negative nitrite, small LE  Plan  -Will keep monitoring BID RFP  -Urine lytes and kidney US ordered, unremarkable  -likely cardiorenal, will keep diuresing     #HLD  -lipid profile ordered  -Atorvastatin resumed      #Afib   -Controlled rate on admission   -Resumed DOAC     #PE in the past (15 years ago)  -On DOAC, indefinite therapy     F: replete PRN  E: replete PRN, K>4 Mg>2  N:  regular 2g Na  A: Peripheral IV     DVT PPX: DOAC  GI Ppx: not indicated     Code Status: Full Code  NOK: Extended Emergency Contact Information  Primary Emergency Contact: KaitlynnEmma  Home Phone: [phone]  Relation: Spouse            Krista Norris MD

## 2023-10-13 NOTE — PROGRESS NOTES
Physical Therapy    Physical Therapy Treatment    Patient Name: Michael Caldeórn  MRN: [id number]  Today's Date: 10/13/2023  Time Calculation  Start Time: 1025  Stop Time: 1049  Time Calculation (min): 24 min       Assessment/Plan   Assessment Comment: Pt tolerated session well. Continue to recommend low intensity PT.  End of Session Patient Position: Up in chair, Alarm off, not on at start of session       General Visit Information:   PT  Visit  PT Received On: 10/13/23  Prior to Session Communication: Bedside nurse  Patient Position Received: Bed, 3 rail up, Alarm off, not on at start of session  General Comment: pt supine in bed, willing to participate    Subjective   Precautions:  Precautions  Medical Precautions: Fall precautions      Objective   Pain:  Pain Assessment  Pain Assessment: 0-10  Pain Score: 0 - No pain      Treatments:  Therapeutic Exercise  Therapeutic Exercise Performed: Yes  Therapeutic Exercise Activity 1: BLE x10: AP, LAQ, hip flex, hip abd/add                 Bed Mobility  Bed Mobility: Yes  Bed Mobility 1  Bed Mobility 1: Supine to sitting  Level of Assistance 1:  (SBA)  Bed Mobility Comments 1: HOB elevated, use of rails    Transfers  Transfer: Yes  Transfer 1  Transfer From 1: Bed to  Transfer to 1: Chair with arms  Technique 1: Sit to stand, Stand to sit  Transfer Level of Assistance 1: Contact guard             Outcome Measures:  [facility] Basic Mobility  Turning from your back to your side while in a flat bed without using bedrails: A little  Moving from lying on your back to sitting on the side of a flat bed without using bedrails: A little  Moving to and from bed to chair (including a wheelchair): A little  Standing up from a chair using your arms (e.g. wheelchair or bedside chair): A little  To walk in hospital room: A little  Climbing 3-5 steps with railing: A little  Basic Mobility - Total Score: 18    Education Documentation  Precautions, taught by Humera Monique, PT at 10/13/2023  11:54 AM.  Learner: Patient  Readiness: Acceptance  Method: Explanation  Response: Verbalizes Understanding    Mobility Training, taught by Humera Monique PT at 10/13/2023 11:54 AM.  Learner: Patient  Readiness: Acceptance  Method: Explanation  Response: Verbalizes Understanding    Education Comments  No comments found.           Encounter Problems       Encounter Problems (Active)       Mobility       STG -  Patient will ambulate 75 feet x2 with LRD modified independent (Progressing)       Start:  10/13/23    Expected End:  10/27/23            STG - Patient will ascend and descend 3 stairs independent (Progressing)       Start:  10/13/23    Expected End:  10/27/23               Transfers       STG - Patient will transfer sit to and from stand with LRD Modified independent (Progressing)       Start:  10/13/23    Expected End:  10/27/23                  Encounter Problems (Resolved)       Mobility       STG - Patient will ambulate 75 feet x2 with LRD modified independent (Not met)       Start:  10/05/23    Expected End:  10/19/23    Resolved:  10/10/23         STG - Patient will ascend and descend 3 stairs with LRD modified independent (Not met)       Start:  10/05/23    Expected End:  10/19/23    Resolved:  10/10/23            Pain - Adult          Transfers       STG - Patient will perform bed mobility independent (Not met)       Start:  10/05/23    Expected End:  10/19/23    Resolved:  10/10/23         STG - Patient will transfer sit to and from stand with LRD modified independent (Not met)       Start:  10/05/23    Expected End:  10/19/23    Resolved:  10/10/23

## 2023-10-13 NOTE — SIGNIFICANT EVENT
RAPID RESPONSE RN NOTE-RADAR 6   10/13/23 1208   Onset Documentation   Rapid Response Initiated By Radar auto page   Location/Room [facility]   Pager Time [phone]   Arrival Time 1210   Event End Time 1215   Level II Called No   Primary Reason for Call Radar auto page  (RADAR 6)     RADAR page received.  Discussed VS and POC with bedside RN Trini- patient sitting up in chair and comfortable. POX rechecked and 94%,   No concerns from nursing at this time- bedside RN to contact Rapid Response with any further concerns or patient deterioration.

## 2023-10-14 NOTE — PROGRESS NOTES
Michael Calderón is a 63 y.o. male on day 10 of admission presenting with Acute on chronic congestive heart failure, unspecified heart failure type (CMS/HCC).    Subjective   Patient seated comfortably in chair with no acute events overnight. He feels work of breathing is improved but maintains sensation of abdominal fullness is unchanged.    Objective     Physical Exam  Constitutional:       General: He is not in acute distress.     Appearance: He is obese.   HENT:      Head: Normocephalic and atraumatic.      Right Ear: External ear normal.      Left Ear: External ear normal.      Nose: Nose normal.      Mouth/Throat:      Mouth: Mucous membranes are moist.   Eyes:      Extraocular Movements: Extraocular movements intact.      Conjunctiva/sclera: Conjunctivae normal.      Pupils: Pupils are equal, round, and reactive to light.   Cardiovascular:      Rate and Rhythm: Regular rhythm. Tachycardia present.      Pulses: Normal pulses.      Heart sounds: Normal heart sounds.   Pulmonary:      Effort: Pulmonary effort is normal.      Breath sounds: No stridor. No wheezing, rhonchi or rales.      Comments: Slightly diminished breath sounds at bilateral bases  Abdominal:      General: Abdomen is flat. Bowel sounds are normal. There is no distension.      Palpations: Abdomen is soft.      Tenderness: There is no abdominal tenderness. There is no right CVA tenderness, left CVA tenderness or guarding.   Musculoskeletal:         General: Normal range of motion.      Right lower leg: Edema present.      Left lower leg: Edema present.      Comments: 2+ bilateral LE edema with chronic venous stasis changes   Skin:     General: Skin is warm.      Capillary Refill: Capillary refill takes less than 2 seconds.   Neurological:      General: No focal deficit present.      Mental Status: He is alert and oriented to person, place, and time.   Psychiatric:         Mood and Affect: Mood normal.         Behavior: Behavior normal.         Last  Recorded Vitals  Blood pressure 100/65, pulse 96, temperature 36 °C (96.8 °F), resp. rate 18, height 1.829 m (6'), weight (!) 195 kg (428 lb 12.7 oz), SpO2 97 %.  Intake/Output last 3 Shifts:  I/O last 3 completed shifts:  In: 940 (4.8 mL/kg) [P.O.:940]  Out: 953 (4.9 mL/kg) [Urine:950 (0.1 mL/kg/hr); Stool:3]  Weight: 194.5 kg     Assessment/Plan   Principal Problem:    Acute on chronic congestive heart failure, unspecified heart failure type (CMS/HCC)  Active Problems:    Acute diastolic heart failure with preserved ejection fraction (CMS/HCC)    Atrial fibrillation (CMS/HCC)    Essential hypertension    Volume overload    HLD (hyperlipidemia)    This is a 63yo male with a PMH of HTN, Afib, PE, HLD, HFpEF, and TRACIE, admitted recently for heart failure exacerbation, and is being re-admitted for volume overload again and SG likely related to congestion and volume overload. Compliant with Torsemide 40mg BID. BP is soft and has Afib with controlled rhythm on admission. Admitted for optimization of volume status and home HF medications.     UPDATE 10/14  -Patient stated he only urinated a small amount overnight around 2100, demonstrated approximately 50-100mL of urine in urinal. In setting of elevated Cr, will obtain bladder scan to check for retention and correlate with post-obstructive Cr elevation. Bladder scan returned around 8mL, asked for repeat. Will decide on holding diuresis or continuing. Either patient developed oliguria or is retaining and the bladder scan is returning inaccurate volumes due to body habitus, decision making pending second bladder scan.  -BP meds from home continue to be held    #Heart failure exacerbation with signs of volume overload  ::hs Troponin 33,    ::received 40mg IV lasix on 10/4  ::significant signs of venous congestion on CXR     Plan  -Bumex pending results of second bladder scan  -Held BP meds for very soft blood pressures including:  -Lisinopril 40mg daily - held    -Amlodipine 5mg daily- held  -Hydralazine 50mg TID- held      #SG, cardiorenal  ::likely due to congestion and volume overload, current Cr 2.88, baseline 1.2  ::UA: negative nitrite, small LE  ::Cr up to 4.37 as of 10/14, either complication of ongoing cardiorenal syndrome or post-obstructive nephropathy due to urinary retention. If repeat bladder scan comes back wnl, can hold diuresis, otherwise medina and continue bumex.  Plan  -Will keep monitoring BID RFP  -Urine lytes and kidney US ordered, unremarkable  -likely cardiorenal, will keep diuresing     #HLD  -lipid profile ordered  -Atorvastatin resumed      #Afib   -Controlled rate on admission   -Resumed DOAC     #PE in the past (15 years ago)  -On DOAC, indefinite therapy     F: replete PRN  E: replete PRN, K>4 Mg>2  N: regular 2g Na  A: Peripheral IV     DVT PPX: DOAC  GI Ppx: not indicated     Code Status: Full Code  NOK: Extended Emergency Contact Information  Primary Emergency Contact: Emma Calderón  Home Phone: [phone]  Relation: Spouse     Wade Bailey MD  Anesthesiology, PGY-1  Pager [phone]

## 2023-10-15 NOTE — SIGNIFICANT EVENT
10/15/23 1602   Onset Documentation   Rapid Response Initiated By Radar auto page   Location/Room [facility] [place]   Pager Time [phone]   Arrival Time 1635   Event End Time 1640   Level II Called No   Primary Reason for Call Radar auto page     Rapid Response Note    Radar auto-page received for a Radar score of 8 with the following vital signs:  36.6, 112, 20, 89/58, 94%.  Vital signs reviewed with RN.  Primary team ordered 500 mL LR bolus.  RN with no other concerns at this time.  RN will contact Rapid Response with any concerns or patient worsening.

## 2023-10-15 NOTE — CARE PLAN
Problem: Safety  Goal: I will remain free of falls  Outcome: Progressing     Problem: Psychosocial Needs  Goal: Collaborate with me, my family, and caregiver to identify my specific goals  Outcome: Progressing     Problem: Discharge Barriers  Goal: My discharge needs are met  Outcome: Progressing   The patient's goals for the shift include less pain    The clinical goals for the shift include Rest, comfort, recover

## 2023-10-15 NOTE — PROGRESS NOTES
Michael Calderón is a 63 y.o. male on day 11 of admission presenting with Acute on chronic congestive heart failure, unspecified heart failure type (CMS/HCC).    Subjective   Patient seated uncomfortably in chair on pre-rounding, complaining that abdominal fullness is worse this AM. On rounds, seen with [facility] liaison, discussed plans for diuresis with him.     Objective     Physical Exam  Constitutional:       General: He is not in acute distress.     Appearance: He is obese.   HENT:      Head: Normocephalic and atraumatic.      Right Ear: External ear normal.      Left Ear: External ear normal.      Nose: Nose normal.      Mouth/Throat:      Mouth: Mucous membranes are moist.   Eyes:      Extraocular Movements: Extraocular movements intact.      Conjunctiva/sclera: Conjunctivae normal.      Pupils: Pupils are equal, round, and reactive to light.   Cardiovascular:      Rate and Rhythm: Regular rhythm. Tachycardia present.      Pulses: Normal pulses.      Heart sounds: Normal heart sounds.   Pulmonary:      Effort: Pulmonary effort is normal.      Breath sounds: No stridor. No wheezing, rhonchi or rales.      Comments: Slightly diminished breath sounds at bilateral bases  Abdominal:      General: Abdomen is flat. Bowel sounds are normal. There is no distension.      Palpations: Abdomen is soft.      Tenderness: There is no abdominal tenderness. There is no right CVA tenderness, left CVA tenderness or guarding.   Musculoskeletal:         General: Normal range of motion.      Right lower leg: Edema present.      Left lower leg: Edema present.      Comments: 3+ bilateral LE edema with chronic venous stasis changes   Skin:     General: Skin is warm.      Capillary Refill: Capillary refill takes less than 2 seconds.   Neurological:      General: No focal deficit present.      Mental Status: He is alert and oriented to person, place, and time.   Psychiatric:         Mood and Affect: Mood normal.         Behavior:  Behavior normal.         Last Recorded Vitals  Blood pressure 100/58, pulse (!) 116, temperature 36.6 °C (97.9 °F), resp. rate 18, height 1.829 m (6'), weight (!) 203 kg (447 lb 12.1 oz), SpO2 96 %.  Intake/Output last 3 Shifts:  I/O last 3 completed shifts:  In: 2120 (10.4 mL/kg) [P.O.:2120]  Out: 260 (1.3 mL/kg) [Urine:260 (0 mL/kg/hr)]  Weight: 203.1 kg     Assessment/Plan   Principal Problem:    Acute on chronic congestive heart failure, unspecified heart failure type (CMS/HCC)  Active Problems:    Acute diastolic heart failure with preserved ejection fraction (CMS/HCC)    Atrial fibrillation (CMS/HCC)    Essential hypertension    Volume overload    HLD (hyperlipidemia)    This is a 63yo male with a PMH of HTN, Afib, PE, HLD, HFpEF, and TRACIE, admitted recently for heart failure exacerbation, and is being re-admitted for volume overload again and SG likely related to congestion and volume overload. Compliant with Torsemide 40mg BID. BP is soft and has Afib with controlled rhythm on admission. Admitted for optimization of volume status and home HF medications.     UPDATE 10/15  -While yesterday's bladder scans described 8mL residual and patient endorsed urination overnight, concern that worsening abdominal fullness was from incomplete emptying and underlying retention.  -Because of concern for retention because of concerns for inadequate bladder scan results due to body habitus, medina placed this AM. Returned only 10mL.  -NPO at midnight for consideration of RHC tomorrow, holding diuresis in setting of Cr bump.  -Added tramadol PRN for pain  -BP meds from home continue to be held    #Heart failure exacerbation with signs of volume overload  ::hs Troponin 33,    ::received 40mg IV lasix on 10/4  ::significant signs of venous congestion on CXR     Plan  -Bumex pending results of second bladder scan  -Held BP meds for very soft blood pressures including:  -Lisinopril 40mg daily - held   -Amlodipine 5mg daily-  held  -Hydralazine 50mg TID- held      #SG, cardiorenal  ::likely due to congestion and volume overload, current Cr 2.88, baseline 1.2  ::UA: negative nitrite, small LE  ::Cr up to 4.37 as of 10/14, either complication of ongoing cardiorenal syndrome or post-obstructive nephropathy due to urinary retention. If repeat bladder scan comes back wnl, can hold diuresis, otherwise medina and continue bumex.  Plan  -Will keep monitoring BID RFP  -Urine lytes and kidney US ordered, unremarkable  -likely cardiorenal, will keep diuresing     #HLD  -lipid profile ordered  -Atorvastatin resumed      #Afib   -Controlled rate on admission   -Resumed DOAC     #PE in the past (15 years ago)  -On DOAC, indefinite therapy     F: replete PRN  E: replete PRN, K>4 Mg>2  N: regular 2g Na  A: Peripheral IV     DVT PPX: DOAC  GI Ppx: not indicated     Code Status: Full Code  NOK: Extended Emergency Contact Information  Primary Emergency Contact: Emma Calderón  Home Phone: [phone]  Relation: Spouse     Wade Bailey MD  Anesthesiology, PGY-1  Pager [phone]

## 2023-10-16 NOTE — PROGRESS NOTES
Michael Calderón is a 63 y.o. male on day 12 of admission presenting with Acute on chronic congestive heart failure, unspecified heart failure type (CMS/HCC).    Subjective   Patient seated uncomfortably in chair on pre-rounding, no new complants. Discussed that we will be doing RHC potentially and will give him fluids because his kidney function is worsening. Patient agreed to plan.     Objective     Physical Exam  Constitutional:       General: He is not in acute distress.     Appearance: He is obese.   HENT:      Head: Normocephalic and atraumatic.      Right Ear: External ear normal.      Left Ear: External ear normal.      Nose: Nose normal.      Mouth/Throat:      Mouth: Mucous membranes are moist.   Eyes:      Extraocular Movements: Extraocular movements intact.      Conjunctiva/sclera: Conjunctivae normal.      Pupils: Pupils are equal, round, and reactive to light.   Cardiovascular:      Rate and Rhythm: Regular rhythm. Tachycardia present.      Pulses: Normal pulses.      Heart sounds: Normal heart sounds.   Pulmonary:      Effort: Pulmonary effort is normal.      Breath sounds: No stridor. No wheezing, rhonchi or rales.      Comments: Slightly diminished breath sounds at bilateral bases  Abdominal:      General: Abdomen is flat. Bowel sounds are normal. There is no distension.      Palpations: Abdomen is soft.      Tenderness: There is no abdominal tenderness. There is no right CVA tenderness, left CVA tenderness or guarding.   Musculoskeletal:         General: Normal range of motion.      Right lower leg: Edema present.      Left lower leg: Edema present.      Comments: 3+ bilateral LE edema with chronic venous stasis changes   Skin:     General: Skin is warm.      Capillary Refill: Capillary refill takes less than 2 seconds.   Neurological:      General: No focal deficit present.      Mental Status: He is alert and oriented to person, place, and time.   Psychiatric:         Mood and Affect: Mood normal.          Behavior: Behavior normal.         Last Recorded Vitals  Blood pressure (!) 121/49, pulse (!) 131, temperature 36.4 °C (97.5 °F), resp. rate 15, height 1.829 m (6'), weight (!) 195 kg (430 lb 8.9 oz), SpO2 92 %.  Intake/Output last 3 Shifts:  I/O last 3 completed shifts:  In: 1660 (8.5 mL/kg) [P.O.:1660]  Out: 180 (0.9 mL/kg) [Urine:180 (0 mL/kg/hr)]  Weight: 195.3 kg     Assessment/Plan   Principal Problem:    Acute on chronic congestive heart failure, unspecified heart failure type (CMS/HCC)  Active Problems:    Acute diastolic heart failure with preserved ejection fraction (CMS/HCC)    Atrial fibrillation (CMS/HCC)    Essential hypertension    Volume overload    HLD (hyperlipidemia)    Acute on chronic diastolic (congestive) heart failure (CMS/HCC)    This is a 61yo male with a PMH of HTN, Afib, PE, HLD, HFpEF, and TRACIE, admitted recently for heart failure exacerbation, and is being re-admitted for volume overload again and SG likely related to congestion and volume overload. Compliant with Torsemide 40mg BID. BP is soft and has Afib with controlled rhythm on admission. Admitted for optimization of volume status and home HF medications.     UPDATE 10/16  - RHC today   - fluids ordered d/t rising Cr, 5.46 today from 4.37.   - Renal consulted, follow up recs      #Heart failure exacerbation with signs of volume overload  ::hs Troponin 33,    ::received 40mg IV lasix on 10/4  ::significant signs of venous congestion on CXR     Plan  -Bumex pending results of second bladder scan  -Held BP meds for very soft blood pressures including:  -Lisinopril 40mg daily - held   -Amlodipine 5mg daily- held  -Hydralazine 50mg TID- held      #SG, cardiorenal  ::likely due to congestion and volume overload, current Cr 2.88, baseline 1.2  ::UA: negative nitrite, small LE  ::Cr up to 4.37 as of 10/14, either complication of ongoing cardiorenal syndrome or post-obstructive nephropathy due to urinary retention. If repeat  bladder scan comes back wnl, can hold diuresis, otherwise medina and continue bumex.  Plan  -Will keep monitoring BID RFP  -Urine lytes and kidney US ordered, unremarkable  -likely cardiorenal, will keep diuresing     #HLD  -lipid profile ordered  -Atorvastatin resumed      #Afib   -Controlled rate on admission   -Resumed DOAC     #PE in the past (15 years ago)  -On DOAC, indefinite therapy     F: replete PRN  E: replete PRN, K>4 Mg>2  N: regular 2g Na  A: Peripheral IV     DVT PPX: DOAC  GI Ppx: not indicated     Code Status: Full Code  NOK: Extended Emergency Contact Information  Primary Emergency Contact: Emma Calderón  Home Phone: [phone]  Relation: Spouse     Teo Larson MD

## 2023-10-16 NOTE — CARE PLAN
The patient's goals for the shift include less pain    The clinical goals for the shift include pt will have no c/o sob while resting    Over the shift, the patient did make progress toward the following goals. Recommendations to address these barriers include using the call bell for assistance.

## 2023-10-16 NOTE — CARE PLAN
The patient's goals for the shift include less pain    The clinical goals for the shift include pt will have no c/o sob while resting    Over the shift, the patient did make progress toward the following goals. Recommendations to address these barriers include resting during this shift and using the call light for assistance.

## 2023-10-16 NOTE — POST-PROCEDURE NOTE
Physician Transition of Care Summary  Invasive Cardiovascular Lab    Procedure Date: 10/16/2023  Attending:    Bladimir Juares - Primary  Resident/Fellow/Other Assistant: No surgical staff documented.    Pre Procedure Indications:   SOB    Post Procedure Diagnosis:   Biventricular failure [right more than left] with low CI.    Procedure(s):   RHC    Procedure Findings:   -Large respiratory variations, end expiration numbers as follows: Elevated right- [RA 10, RVEDP 14 mmHg] and mildly elevated left- [PCWP 16 mmHg] sided filling pressures, normal pulmonary pressures [PA 33/5, mean 21 mmHg], and low cardiac index at 1.7 L/min/m2 [CO 4.9 L/min].    Description of the Procedure:   Access 7F right internal jugular closed with manual pressure.     Complications:   None.    Estimated Blood Loss:   5 mL    Anesthesia: Moderate Sedation Anesthesia Staff: No anesthesia staff entered.    Any Specimen(s) Removed:   No specimens collected during this procedure.    Disposition:   -Further management as per inpatient cardiology team.       Electronically signed by: Candis Juares MD, 10/16/2023 4:12 PM

## 2023-10-16 NOTE — SIGNIFICANT EVENT
CHRISTINE   10/16/23 1702   Onset Documentation   Rapid Response Initiated By Radar auto page   Pager Time 1702   Arrival Time 1725   Event End Time 1735   Primary Reason for Call Radar auto page     RADAR page auto generated from VS -see documented VS. Call to floor and spoke with the bedside nurse. Pt has a hx of afib and with activity/emotion -HR elevated. Recheck of VS done and HR in the 80's. No concerns at this time. Pt ok to remain on the floor for continued tele monitoring and will call with any concerns.

## 2023-10-16 NOTE — PRE-SEDATION DOCUMENTATION
Sedation Plan    ASA 3     Mallampati class: IV.    Risks, benefits, and alternatives discussed with patient.

## 2023-10-17 NOTE — CARE PLAN
The patient's goals for the shift include less pain    The clinical goals for the shift include pt will have no c/o sob while resting    Over the shift, the patient did  make progress toward the following goal as demonstrated by no c/o pain and no c/o sob so far this shift.

## 2023-10-17 NOTE — CONSULTS
Reason For Consult  Worsening SG     History Of Present Illness  Michael Calderón is a 63 y.o. male presenting with PMHx HTN, atrial fibrillation on rate control, remote history (years ago per patient) requiring chronic anticoagulation w/ Eliquis, HLD, HFpER, and TRACIE who was admitted for suspected heart failure exacerbation. Pt was admitted on 10/4 after presenting to the ED w/ complaints of volume overload and cough. Patient states that when he was previously admitted in July 2023 w/ heart failure exacerbation, his legs and scrotum were extremely swollen. However, patient states that this time the majority of his swelling was in his abdomen, and that his legs were relatively normal for him. He also reports ~2 week history of dry cough, and thought this was due to him accumulating fluid. He denies any weight loss, fevers, chills, hemoptysis, night sweats. He does endorse ~2-3 days of diarrhea prior to presenting to the ED. He denies any previous history of kidney problems, he denies family history of kidney problems or family members on dialysis, he denies prior tobacco use (smokeless or smoking), he denies any cancer history in himself or significant family history of cancers.      Past Medical History  He has a past medical history of Arthritis, CHF (congestive heart failure) (CMS/[facility]), Hypertension, Hypertensive heart disease with heart failure (CMS/[facility]) (07/14/2023), Hypertensive urgency (07/14/2023), Personal history of other venous thrombosis and embolism (07/26/2016), Plantar wart (02/19/2016), and Sepsis with acute hypoxic respiratory failure (CMS/[facility]) (10/06/2023).    Surgical History  He has no past surgical history on file.     Social History  He reports that he has never smoked. He has never used smokeless tobacco. He reports that he does not currently use alcohol. He reports that he does not use drugs.    Family History  Family History   Problem Relation Name Age of Onset    Hypertension Father       Other (cardiac disorder) Father      Pulmonary embolism Brother          Allergies  Patient has no known allergies.    Review of Systems  Positive for abdominal swelling, shortness of breath, diarrhea, cough.      Physical Exam  Physical Exam  Constitutional:       General: He is not in acute distress.     Appearance: He is obese.   HENT:      Head: Normocephalic and atraumatic.      Mouth/Throat:      Mouth: Mucous membranes are moist.      Pharynx: Oropharynx is clear.   Eyes:      General: No scleral icterus.     Conjunctiva/sclera: Conjunctivae normal.      Pupils: Pupils are equal, round, and reactive to light.   Cardiovascular:      Rate and Rhythm: Tachycardia present. Rhythm irregular.      Pulses: Normal pulses.      Heart sounds: No murmur heard.     No gallop.   Pulmonary:      Breath sounds: No stridor. Wheezing present. No rhonchi.      Comments: Noted dyspnea with conversation   Abdominal:      Palpations: Abdomen is soft.      Tenderness: There is no abdominal tenderness. There is no guarding.   Musculoskeletal:         General: Normal range of motion.      Cervical back: Normal range of motion. No rigidity.      Left lower leg: Edema present.      Comments: 2+ nonpitting edema in LLE, 1+ on right; skin changes consistent w/ chronic venous insufficiency   Skin:     General: Skin is warm and dry.   Neurological:      General: No focal deficit present.      Mental Status: He is alert and oriented to person, place, and time.   Psychiatric:         Mood and Affect: Mood normal.         Behavior: Behavior normal.          Last Recorded Vitals  Blood pressure 90/66, pulse 92, temperature 36.3 °C (97.3 °F), resp. rate 18, height 1.829 m (6'), weight (!) 195 kg (430 lb 8.9 oz), SpO2 95 %.    Relevant Results  Lab Results   Component Value Date    CREATININE 7.00 (H) 10/17/2023    BUN 76 (H) 10/17/2023     10/17/2023    K 4.4 10/17/2023    CL 98 10/17/2023    CO2 27 10/17/2023     Lab Results   Component  Value Date    CALCIUM 8.8 10/17/2023    PHOS 5.3 (H) 10/17/2023     Magnesium 2.38    Lab Results   Component Value Date    WBC 10.9 10/17/2023    HGB 11.2 (L) 10/17/2023    HCT 35.4 (L) 10/17/2023    MCV 74 (L) 10/17/2023     10/17/2023      on admission     CT abdomen pelvis wo IV contrast 10/15/2023    Narrative  Interpreted By:  Krishna Mccoy and Benza Andrew  STUDY:  CT ABDOMEN PELVIS WO IV CONTRAST;  10/15/2023 7:44 pm    INDICATION:  Signs/Symptoms:worsneing abdominal and back pain.    COMPARISON:  CT abdomen and pelvis dated 08/21/2021    ACCESSION NUMBER(S):  [id number]    ORDERING CLINICIAN:  ALONZO LOPEZ    TECHNIQUE:  CT of the abdomen and pelvis was performed. Contiguous axial images  were obtained at 3 mm slice thickness through the abdomen and pelvis.  Coronal and sagittal reconstructions at 3 mm slice thickness were  performed.  No intravenous or oral contrast agents were administered.  Examination is limited by extensive photon starvation artifact.    FINDINGS:  Please note that the evaluation of vessels, lymph nodes and organs is  limited without intravenous contrast.    LOWER CHEST:  There are right-greater-than-left scattered nodular/consolidative  airspace opacities throughout the visualized lung bases. Trace left  pleural effusion. There is moderate to severe cardiomegaly with small  pericardial effusion. There are prominent pericardial lymph nodes.  For example a enlarged lymph node measures 1.5 x 1.3 cm (series 201,  image 39).    ABDOMEN:    LIVER:  The liver is normal in size without evidence of focal liver lesions.    BILE DUCTS:  The intrahepatic and extrahepatic ducts are not dilated.    GALLBLADDER:  The gallbladder is nondistended and without evidence of radiopaque  stones.    PANCREAS:  Not well visualized due to photon starvation artifact.    SPLEEN:  The spleen is normal in size without focal lesions.    ADRENAL GLANDS:  Bilateral adrenal glands appear  normal.    KIDNEYS AND URETERS:  The kidneys are normal in size and unremarkable in appearance.  No  hydroureteronephrosis or nephroureterolithiasis is identified.    PELVIS:    BLADDER:  Assessment is limited due to artifact.    REPRODUCTIVE ORGANS:  No pelvic masses, although assessment is limited due to artifact.    BOWEL:  The stomach is unremarkable.  The small and large bowel are normal in  caliber and demonstrate no wall thickening.  The appendix appears  normal.    VESSELS:  There is no aneurysmal dilatation of the abdominal aorta. The IVC  appears normal.    PERITONEUM/RETROPERITONEUM/LYMPH NODES:  No free air or fluid collection is visualized. There is diffuse  mesenteric edema. There is mild abdominopelvic ascites. Interval  development of extensive retroperitoneal, mesenteric, and pelvic  lymphadenopathy interval increase in size and number throughout the  abdomen and pelvis when compared to prior CT 08/21/2021. For example,  there is an enlarged para-aortic lymph node measuring 3.7 x 2.5 cm  (series 201, image 104). There is an enlarged mesenteric lymph node  measuring 3.8 x 3.0 cm (series 201, image 73). There is an enlarged  right pelvic lymph node measuring 3.2 x 1.9 cm (series 201, image  162). There are enlarged inguinal lymph nodes. For example, there is  an enlarged right inguinal lymph node measuring 2.6 x 1.5 cm (series  201, image 176).    ABDOMINAL WALL:  Diffuse body wall edema. Similar appearance of moderate umbilical  hernia containing fat and ascitic fluid.    BONES:  No suspicious osseous lesions are identified. Degenerative discogenic  disease is noted in the lower thoracic and lumbar spine.    Impression  1. Interval development of lymphadenopathy with representative  measurements as detailed. Primary consideration would be for a  neoplastic process, such as lymphoma. Tissue diagnosis is recommended  2. Right-greater-than-left scattered nodular/consolidative opacities  airspace  opacities throughout the visualized lung bases, which may be  infectious in etiology, however lymphoma have a similar appearance.  3. Cardiomegaly with small pericardial effusion.  4. Anasarca.    I personally reviewed the images/study and I agree with the resident  findings as stated. This study was interpreted at [facility], [place], [place].    MACRO:  None    Signed by: Krishna Mccoy 10/15/2023 8:55 PM  Dictation workstation:   [id number]        Assessment/Plan     Mr. Michael Calderón is a 63 year old gentleman w/ PMHx significant for TN, atrial fibrillation on rate control, remote history (years ago per patient) requiring chronic anticoagulation w/ Eliquis, HLD, HFpER, and TRACIE who was admitted for suspected heart failure exacerbation. Pt intially treated w/ diuresis w/ worsening of his creatinine. Baseline appears to be 1.1-1.3 as an outpatient, and was 2.88 on admission. Has since uptrended despite adequate diuresis, over last 3 days 5.46, 6.30, and 7.00 today. Urine studies w/ FeUrea 4.2% indicative of pre-renal etiology. Pt was given 2L LR on 10/16 w/ no improvement of creatinine. In addition, patient had right heart cath on 10/16 which showed RA pressure 10, RVEDP 14, Wedge 16, PA pressure of 33/5 w/ mean 21, and cardiac index 1.7. Echo on 10/4 showed EF 60-65% w/ moderate pericardial effusion (~stable to slightly increased from 7/18). Did have CT abdomen/pelvis which was showing abdominal lymphadenopathy as well as anasarca, current plan is for PET scan. Given adenopathy seen on CT scan, SPEP and UPEP have been sent and still awaiting results.     Recommendations:   - Overall, given patient's relatively low pressures on Right heart cath, likely not much fluid to diurese at this time, and was needing increasing doses of diuretics  - Patient did have pericardial effusion on 10/4, but relatively stable from 7/2023 per report, less likely to be uremic pericarditis   -  Would evaluate for possible glomerulonephritis by obtaining:   - C3 and C4 complement levels   - ANCA profile   - Would favor rechecking Urine electrolytes, urinalysis, and urine protein to creatinine ratio. Patient could have previously been in pre-renal SG w/ FeUrea of 4.2%, would like to check again to see if patient possibly progressed to ATN.   - Would check renal ultrasound   - At this time, would hold off on any further diuresis or fluid administration. Patient had minimal output w/ escalating doses of diuretics, and if he develops volume overload/pulmonary edema, would likely need ultrafiltration for fluid removal.     Patient seen and discussed w/ attending Dr. Olivarez.     Marco Antonio Cordova MD  Internal Medicine PGY-1

## 2023-10-17 NOTE — PROGRESS NOTES
Michael Calderón is a 63 y.o. male on day 13 of admission presenting with Acute on chronic congestive heart failure, unspecified heart failure type (CMS/HCC).    Subjective   NAEO. This morning patient laying in bed, reports doing about the same as yesterday. No new complaints other than being hungry this AM as he is NPO for potential PET. After rounds it wa determined that PET scan will be tomorrow, diet resumed. Discussed with patient the purpose of the PET scan.     Objective     Physical Exam  Constitutional:       General: He is not in acute distress.     Appearance: He is obese.   HENT:      Head: Normocephalic and atraumatic.      Right Ear: External ear normal.      Left Ear: External ear normal.      Nose: Nose normal.      Mouth/Throat:      Mouth: Mucous membranes are moist.   Eyes:      Extraocular Movements: Extraocular movements intact.      Conjunctiva/sclera: Conjunctivae normal.      Pupils: Pupils are equal, round, and reactive to light.   Cardiovascular:      Rate and Rhythm: Regular rhythm. Tachycardia present.      Pulses: Normal pulses.      Heart sounds: Normal heart sounds.   Pulmonary:      Effort: Pulmonary effort is normal.      Breath sounds: No stridor. No wheezing, rhonchi or rales.      Comments: Slightly diminished breath sounds at bilateral bases  Abdominal:      General: Abdomen is flat. Bowel sounds are normal. There is no distension.      Palpations: Abdomen is soft.      Tenderness: There is no abdominal tenderness. There is no right CVA tenderness, left CVA tenderness or guarding.   Musculoskeletal:         General: Normal range of motion.      Right lower leg: Edema present.      Left lower leg: Edema present.      Comments: 3+ bilateral LE edema with chronic venous stasis changes   Skin:     General: Skin is warm.      Capillary Refill: Capillary refill takes less than 2 seconds.   Neurological:      General: No focal deficit present.      Mental Status: He is alert and oriented  "to person, place, and time.   Psychiatric:         Mood and Affect: Mood normal.         Behavior: Behavior normal.         Last Recorded Vitals  Blood pressure 110/62, pulse 97, temperature 36.1 °C (97 °F), resp. rate 18, height 1.829 m (6'), weight (!) 195 kg (430 lb 8.9 oz), SpO2 98 %.  Intake/Output last 3 Shifts:  I/O last 3 completed shifts:  In: 2290 (11.7 mL/kg) [P.O.:290; IV Piggyback:2000]  Out: 306 (1.6 mL/kg) [Urine:300 (0 mL/kg/hr); Stool:1; Blood:5]  Weight: 195.3 kg     Assessment/Plan   Principal Problem:    Acute on chronic congestive heart failure, unspecified heart failure type (CMS/HCC)  Active Problems:    Acute diastolic heart failure with preserved ejection fraction (CMS/HCC)    Atrial fibrillation (CMS/HCC)    Essential hypertension    Volume overload    HLD (hyperlipidemia)    Acute on chronic diastolic (congestive) heart failure (CMS/HCC)    This is a 61yo male with a PMH of HTN, Afib, PE, HLD, HFpEF, and TRACIE, admitted recently for heart failure exacerbation, and is being re-admitted for volume overload again and SG likely related to congestion and volume overload. Compliant with Torsemide 40mg BID. BP is soft and has Afib with controlled rhythm on admission. Admitted for optimization of volume status and home HF medications.     UPDATE 10/17  - [facility] 10/16: RA 10, RVEDP 14, PCWP 16, PA 33/5, CI 1.7 (CO 4.9). \"Biventricular HF with low CI\"  - Cr continues to rise, 6.3 this AM from 5.46  - Renal consulted, follow up recs  - S/p 2L fluid on 10/16  - PET tomorrow for new abdominal lymphadenopathy since 2021 scan. Patient NPO at midnight. CMO approved PET.   - Follow up UPEP and SPEP      #Heart failure exacerbation with signs of volume overload  ::hs Troponin 33,    ::received 40mg IV lasix on 10/4  ::significant signs of venous congestion on CXR     Plan  -Bumex pending results of second bladder scan  -Held BP meds for very soft blood pressures including:  -Lisinopril 40mg daily - held "   -Amlodipine 5mg daily- held  -Hydralazine 50mg TID- held      #SG, cardiorenal  ::likely due to congestion and volume overload, current Cr 2.88, baseline 1.2  ::UA: negative nitrite, small LE  ::Cr up to 4.37 as of 10/14, either complication of ongoing cardiorenal syndrome or post-obstructive nephropathy due to urinary retention. If repeat bladder scan comes back wnl, can hold diuresis, otherwise medina and continue bumex.  Plan  -Will keep monitoring BID RFP  -Urine lytes and kidney US ordered, unremarkable  -likely cardiorenal, will keep diuresing     #HLD  -lipid profile ordered  -Atorvastatin resumed      #Afib   -Controlled rate on admission   -Resumed DOAC     #PE in the past (15 years ago)  -On DOAC, indefinite therapy     F: replete PRN  E: replete PRN, K>4 Mg>2  N: regular 2g Na  A: Peripheral IV     DVT PPX: DOAC  GI Ppx: not indicated     Code Status: Full Code  NOK: Extended Emergency Contact Information  Primary Emergency Contact: Oz Calderónny  Home Phone: [phone]  Relation: Spouse     Teo Larson MD

## 2023-10-18 NOTE — CONSULTS
"Nutrition Education:   Reason for Assessment: Provider consult order (CHF education)    Principal Problem:    Acute on chronic congestive heart failure, unspecified heart failure type (CMS/HCC)  Active Problems:    Acute diastolic heart failure with preserved ejection fraction (CMS/HCC)    Atrial fibrillation (CMS/HCC)    Essential hypertension    Volume overload    HLD (hyperlipidemia)    Acute on chronic diastolic (congestive) heart failure (CMS/HCC)    This is a 63yo male with a PMH of HTN, Afib, PE, HLD, HFpEF, and TRACIE, admitted recently for heart failure exacerbation, and is being re-admitted for volume overload again and SG likely related to congestion and volume overload.     Nutrition History:  Food and Nutrient History: Met with pt and wife to discuss CHF diet education.  Last year pt started a weight loss diet/healthier eating plan.  Reports losing ~100# since starting to watch what he eats.  Has been working on intermittent fasting by only eating between 12PM and 7PM. They reported changing diet by limiting and avoiding high salt foods such as added salt, snack foods and salty meats.  They ate more vegetables and cooked at home more.  Switched from salt to lemon pepper and Mrs. Dash.      Reviewed the importance of continuing to following low sodium diet.     Pt and his wife are both motivated to continue to limit high sodium foods.    Anthropometrics:  Height: 183 cm (6' 0.05\")  Weight: (!) 201 kg (443 lb 2 oz)  BMI (Calculated): 60.02  IBW/kg (Dietitian Calculated): 81 kg    Objective/Subjective Weight History:     Weight Change %:     Wt Readings        07/31/23 (!) 171 kg (375 lb 14.4 oz)   02/21/23 (!) 181 kg (398 lb 9 oz)   10/01/21 (!) 210 kg (463 lb)   09/14/21 (!) 199 kg (438 lb 11.5 oz)                 Dietary Orders (From admission, onward)       Start     Ordered    10/18/23 1103  Adult diet 2-3 grams sodium  Diet effective now        Question:  Diet type  Answer:  2-3 grams sodium    " 10/18/23 1103                     Estimated Needs:   Total Energy Estimated Needs (kCal): 2800 kCal   Method for Estimating Needs: REE = 2857 (MS)      Monitoring/Evaluation:   Continue to follow for need for further ed or nutrition intervention.      Time Spent/Follow-up Reminder:   Follow Up  Time Spent (min): 60 minutes  Last Date of Nutrition Visit: 10/18/23  Nutrition Follow-Up Needed?: Dietitian to reassess per policy

## 2023-10-18 NOTE — PROGRESS NOTES
Michael Calderón is a 63 y.o. male on day 14 of admission presenting with Acute on chronic congestive heart failure, unspecified heart failure type (CMS/HCC).    Subjective   Patient was to have PET scan for abdominal lymphadenopathy, unable to tolerate due to inability to lie flat. Patient was stable overnight, no change in SOB, edema, nausea/vomiting. Patient continues to endorse abdominal distention and discomfort.        Objective     Physical Exam  Constitutional:       General: He is not in acute distress.     Appearance: Normal appearance. He is not toxic-appearing.   HENT:      Head: Normocephalic and atraumatic.      Mouth/Throat:      Mouth: Mucous membranes are moist.      Pharynx: Oropharynx is clear.   Eyes:      General: No scleral icterus.     Conjunctiva/sclera: Conjunctivae normal.      Pupils: Pupils are equal, round, and reactive to light.   Cardiovascular:      Rate and Rhythm: Normal rate. Rhythm irregular.      Pulses: Normal pulses.      Heart sounds: Normal heart sounds. No murmur heard.     No friction rub. No gallop.   Pulmonary:      Effort: Pulmonary effort is normal. No respiratory distress.      Breath sounds: Normal breath sounds. No wheezing, rhonchi or rales.   Abdominal:      General: Bowel sounds are normal. There is distension.      Palpations: Abdomen is soft.      Tenderness: There is abdominal tenderness. There is no guarding.   Musculoskeletal:         General: Swelling (bilateral, L>R, chronic venous skin changes) present. Normal range of motion.      Cervical back: Normal range of motion.   Skin:     General: Skin is warm and dry.      Capillary Refill: Capillary refill takes less than 2 seconds.      Coloration: Skin is not jaundiced.   Neurological:      General: No focal deficit present.      Mental Status: He is alert and oriented to person, place, and time.      Cranial Nerves: No cranial nerve deficit.      Motor: No weakness.   Psychiatric:         Mood and Affect: Mood  "normal.         Thought Content: Thought content normal.         Last Recorded Vitals  Blood pressure 99/62, pulse 94, temperature 36 °C (96.8 °F), temperature source Temporal, resp. rate 18, height 1.83 m (6' 0.05\"), weight (!) 201 kg (443 lb 2 oz), SpO2 95 %.  Intake/Output last 3 Shifts:  I/O last 3 completed shifts:  In: 270 (1.3 mL/kg) [P.O.:270]  Out: 231 (1.1 mL/kg) [Urine:230 (0 mL/kg/hr); Stool:1]  Weight: 201.2 kg     Relevant Results  Scheduled medications  acetaminophen, 975 mg, oral, q8h BASILIO  apixaban, 5 mg, oral, q12h  atorvastatin, 40 mg, oral, Daily  furosemide, 120 mg, intravenous, q24h  lidocaine, 1 Application, urethral, Once  magnesium oxide, 800 mg, oral, Daily  metoprolol tartrate, 25 mg, oral, q6h  polyethylene glycol, 17 g, oral, BID      Continuous medications     PRN medications  PRN medications: oxygen, traMADol    Results for orders placed or performed during the hospital encounter of 10/03/23 (from the past 24 hour(s))   Protein, Urine Random   Result Value Ref Range    Total Protein, Urine Random 323 mg/dL   POCT GLUCOSE   Result Value Ref Range    POCT Glucose 97 74 - 99 mg/dL   CBC and Auto Differential   Result Value Ref Range    WBC 10.7 4.4 - 11.3 x10*3/uL    nRBC 0.3 (H) 0.0 - 0.0 /100 WBCs    RBC 4.65 4.50 - 5.90 x10*6/uL    Hemoglobin 11.0 (L) 13.5 - 17.5 g/dL    Hematocrit 35.2 (L) 41.0 - 52.0 %    MCV 76 (L) 80 - 100 fL    MCH 23.7 (L) 26.0 - 34.0 pg    MCHC 31.3 (L) 32.0 - 36.0 g/dL    RDW 17.2 (H) 11.5 - 14.5 %    Platelets 311 150 - 450 x10*3/uL    MPV 11.2 7.5 - 11.5 fL    Neutrophils % 79.8 40.0 - 80.0 %    Immature Granulocytes %, Automated 0.5 0.0 - 0.9 %    Lymphocytes % 6.0 13.0 - 44.0 %    Monocytes % 8.9 2.0 - 10.0 %    Eosinophils % 4.1 0.0 - 6.0 %    Basophils % 0.7 0.0 - 2.0 %    Neutrophils Absolute 8.53 (H) 1.20 - 7.70 x10*3/uL    Immature Granulocytes Absolute, Automated 0.05 0.00 - 0.70 x10*3/uL    Lymphocytes Absolute 0.64 (L) 1.20 - 4.80 x10*3/uL    " Monocytes Absolute 0.95 0.10 - 1.00 x10*3/uL    Eosinophils Absolute 0.44 0.00 - 0.70 x10*3/uL    Basophils Absolute 0.07 0.00 - 0.10 x10*3/uL   Magnesium   Result Value Ref Range    Magnesium 2.35 1.60 - 2.40 mg/dL   Renal Function Panel   Result Value Ref Range    Glucose 90 74 - 99 mg/dL    Sodium 137 136 - 145 mmol/L    Potassium 4.4 3.5 - 5.3 mmol/L    Chloride 96 (L) 98 - 107 mmol/L    Bicarbonate 25 21 - 32 mmol/L    Anion Gap 20 10 - 20 mmol/L    Urea Nitrogen 79 (H) 6 - 23 mg/dL    Creatinine 7.88 (H) 0.50 - 1.30 mg/dL    eGFR 7 (L) >60 mL/min/1.73m*2    Calcium 8.8 8.6 - 10.6 mg/dL    Phosphorus 5.1 (H) 2.5 - 4.9 mg/dL    Albumin 2.5 (L) 3.4 - 5.0 g/dL       Assessment/Plan     Mr. Michael Calderón is a 63 year old gentleman w/ PMHx significant for HTN, atrial fibrillation on rate control, remote history (years ago per patient) requiring chronic anticoagulation w/ Eliquis, HLD, HFpEF, and TRACIE who was admitted for suspected heart failure exacerbation. Pt intially treated w/ diuresis w/ worsening of creatinine. Baseline appears to be 1.1-1.3 as an outpatient, and was 2.88 on admission. Has since uptrended despite adequate diuresis. Urine studies w/ FeUrea 4.2% indicative of pre-renal etiology. Pt was given 2L LR on 10/16 w/ no improvement of creatinine. In addition, patient had right heart cath on 10/16 which showed RA pressure 10, RVEDP 14, Wedge 16, PA pressure of 33/5 w/ mean 21, and cardiac index 1.7. Echo on 10/4 showed EF 60-65% w/ moderate pericardial effusion (~stable to slightly increased from 7/18). Did have CT abdomen/pelvis which was showing abdominal lymphadenopathy as well as anasarca, current plan is for PET scan. Given adenopathy seen on CT scan, SPEP and UPEP have been sent and still awaiting results.      Recommendations:   SG, unclear etiology  - Cr up to 7.88 today    - Given patients worsening creatinine in setting of escalating doses of diuretics, unlikely to be cardio-renal at this  time  - Will await urine studies to help determine ATN from ischemic insults vs other causes of pre-renal SG  - Does have near nephrotic range proteinuria (323mg/dL or 3.2 g/L) on spot test, will await result of urine protein/creatinine ratio  - Pending repeat urine electrolytes and urinalysis  - If patient is found to have increased proteinuria in nephrotic range, could consider kidney biopsy to help determine etiology; will await further testing results  - Renal ultrasound from 10/4 showing normal sized kidneys, no comment of cortical thinning, no hydronephrosis observed   - Patient did have pericardial effusion on 10/4, but relatively stable from 7/2023 per report, less likely to be uremic pericarditis   - Would evaluate for possible glomerulonephritis by obtaining:              - C3 and C4 complement levels negative              - ANCA profile pending   - Would favor rechecking Urine electrolytes, urinalysis, and urine protein to creatinine ratio. Patient could have previously been in pre-renal SG w/ FeUrea of 4.2%, would like to check again to see if patient possibly progressed to ATN.   - At this time, would hold off on any further diuresis or fluid administration. Patient had minimal output w/ escalating doses of diuretics, and if he develops volume overload/pulmonary edema, would likely need ultrafiltration for fluid removal.      Patient seen and discussed w/ attending Dr. Olivarez.      Marco Antonio Cordova MD  Internal Medicine PGY-1

## 2023-10-18 NOTE — PROGRESS NOTES
Michael Calderón is a 63 y.o. male on day 14 of admission presenting with Acute on chronic congestive heart failure, unspecified heart failure type (CMS/HCC).    Subjective   NAEO. This morning patient laying in bed, reports doing about the same as yesterday. No new complaints other than being hungry this AM as he is NPO for potential PET. Renal was seeing patient this morning as well, we will follow up with their studies. Discussed with patient and patient's wife the possibility of needing dialysis over the next few days. They expressed understanding.    Objective     Physical Exam  Constitutional:       General: He is not in acute distress.     Appearance: He is obese.   HENT:      Head: Normocephalic and atraumatic.      Right Ear: External ear normal.      Left Ear: External ear normal.      Nose: Nose normal.      Mouth/Throat:      Mouth: Mucous membranes are moist.   Eyes:      Extraocular Movements: Extraocular movements intact.      Conjunctiva/sclera: Conjunctivae normal.      Pupils: Pupils are equal, round, and reactive to light.   Cardiovascular:      Rate and Rhythm: Regular rhythm. Tachycardia present.      Pulses: Normal pulses.      Heart sounds: Normal heart sounds.   Pulmonary:      Effort: Pulmonary effort is normal.      Breath sounds: No stridor. No wheezing, rhonchi or rales.      Comments: Slightly diminished breath sounds at bilateral bases  Abdominal:      General: Abdomen is flat. Bowel sounds are normal. There is no distension.      Palpations: Abdomen is soft.      Tenderness: There is no abdominal tenderness. There is no right CVA tenderness, left CVA tenderness or guarding.   Musculoskeletal:         General: Normal range of motion.      Right lower leg: Edema present.      Left lower leg: Edema present.      Comments: 3+ bilateral LE edema with chronic venous stasis changes   Skin:     General: Skin is warm.      Capillary Refill: Capillary refill takes less than 2 seconds.    Neurological:      General: No focal deficit present.      Mental Status: He is alert and oriented to person, place, and time.   Psychiatric:         Mood and Affect: Mood normal.         Behavior: Behavior normal.         Last Recorded Vitals  Blood pressure 91/53, pulse 85, temperature 36 °C (96.8 °F), temperature source Temporal, resp. rate 18, height 1.829 m (6'), weight (!) 201 kg (443 lb 9.1 oz), SpO2 97 %.  Intake/Output last 3 Shifts:  I/O last 3 completed shifts:  In: 270 (1.3 mL/kg) [P.O.:270]  Out: 231 (1.1 mL/kg) [Urine:230 (0 mL/kg/hr); Stool:1]  Weight: 201.2 kg     Assessment/Plan   Principal Problem:    Acute on chronic congestive heart failure, unspecified heart failure type (CMS/HCC)  Active Problems:    Acute diastolic heart failure with preserved ejection fraction (CMS/HCC)    Atrial fibrillation (CMS/HCC)    Essential hypertension    Volume overload    HLD (hyperlipidemia)    Acute on chronic diastolic (congestive) heart failure (CMS/HCC)    This is a 61yo male with a PMH of HTN, Afib, PE, HLD, HFpEF, and TRACIE, admitted recently for heart failure exacerbation, and is being re-admitted for volume overload again and SG likely related to congestion and volume overload. Compliant with Torsemide 40mg BID. BP is soft and has Afib with controlled rhythm on admission. Admitted for optimization of volume status and home HF medications.     UPDATE 10/18  - Cr continues to rise, 7.0 this AM from 6.3  - Renal recommended eval for glomerulonephritis with C3&C4 (wnl) and ANCA panel (pending). Previous FeUrea suggested prerenal injury, but they wanted repeat urine lytes, UA and urine protein:Cr to assess if now ATN (pending). Renal ultrasound ordered as well (pending).  - Renal also recommended no more diuresis or fluid for now.   - PET today for new abdominal lymphadenopathy since 2021 scan. Patient NPO at midnight. CMO approved PET.   - Follow up UPEP and SPEP      #Heart failure exacerbation with signs of  volume overload  ::hs Troponin 33,    ::received 40mg IV lasix on 10/4  ::significant signs of venous congestion on CXR     Plan  -Bumex pending results of second bladder scan  -Held BP meds for very soft blood pressures including:  -Lisinopril 40mg daily - held   -Amlodipine 5mg daily- held  -Hydralazine 50mg TID- held      #SG, cardiorenal  ::likely due to congestion and volume overload, current Cr 2.88, baseline 1.2  ::UA: negative nitrite, small LE  ::Cr up to 4.37 as of 10/14, either complication of ongoing cardiorenal syndrome or post-obstructive nephropathy due to urinary retention. If repeat bladder scan comes back wnl, can hold diuresis, otherwise medina and continue bumex.  Plan  -Will keep monitoring BID RFP  -Urine lytes and kidney US ordered, unremarkable  -likely cardiorenal, will keep diuresing     #HLD  -lipid profile ordered  -Atorvastatin resumed      #Afib   -Controlled rate on admission   -Resumed DOAC     #PE in the past (15 years ago)  -On DOAC, indefinite therapy     F: replete PRN  E: replete PRN, K>4 Mg>2  N: regular 2g Na  A: Peripheral IV     DVT PPX: DOAC  GI Ppx: not indicated     Code Status: Full Code  NOK: Extended Emergency Contact Information  Primary Emergency Contact: Oz Calderónny  Home Phone: [phone]  Relation: Spouse     Teo Larson MD

## 2023-10-19 NOTE — PROGRESS NOTES
Physical Therapy                 Therapy Communication Note    Patient Name: Michael Calderón  MRN: [id number]  Today's Date: 10/19/2023     Discipline: Physical Therapy    Missed Visit Reason:  (pt refused due to c/o not feeling well)

## 2023-10-19 NOTE — PROGRESS NOTES
Michael Calderón is a 63 y.o. male on day 15 of admission presenting with Acute on chronic congestive heart failure, unspecified heart failure type (CMS/HCC).    Subjective   Overnight patient did have increased HR 2/2 afib, patient states he felt unwell with this. Feeling better this morning. States his breathing is still doing well. Feels tired/fatigued.        Objective     Physical Exam  Constitutional:       General: He is not in acute distress.     Appearance: Normal appearance. He is not toxic-appearing.   HENT:      Head: Normocephalic and atraumatic.      Mouth/Throat:      Mouth: Mucous membranes are moist.      Pharynx: Oropharynx is clear.   Eyes:      General: No scleral icterus.     Conjunctiva/sclera: Conjunctivae normal.      Pupils: Pupils are equal, round, and reactive to light.   Cardiovascular:      Rate and Rhythm: Tachycardia present. Rhythm irregular.      Pulses: Normal pulses.      Heart sounds: Normal heart sounds. No murmur heard.     No friction rub. No gallop.   Pulmonary:      Effort: Pulmonary effort is normal. No respiratory distress.      Breath sounds: Normal breath sounds. No wheezing, rhonchi or rales.   Abdominal:      General: Bowel sounds are normal. There is distension.      Palpations: Abdomen is soft.      Tenderness: There is abdominal tenderness. There is no guarding.   Musculoskeletal:         General: Swelling (bilateral, L>R, chronic venous skin changes) present. Normal range of motion.      Cervical back: Normal range of motion.   Skin:     General: Skin is warm and dry.      Capillary Refill: Capillary refill takes less than 2 seconds.      Coloration: Skin is not jaundiced.   Neurological:      General: No focal deficit present.      Mental Status: He is alert and oriented to person, place, and time.      Cranial Nerves: No cranial nerve deficit.      Motor: No weakness.   Psychiatric:         Mood and Affect: Mood normal.         Thought Content: Thought content  "normal.         Last Recorded Vitals  Blood pressure 93/61, pulse 106, temperature 36.4 °C (97.5 °F), resp. rate 18, height 1.83 m (6' 0.05\"), weight (!) 198 kg (436 lb 1.1 oz), SpO2 91 %.  Intake/Output last 3 Shifts:  I/O last 3 completed shifts:  In: 380 (1.9 mL/kg) [P.O.:380]  Out: 140 (0.7 mL/kg) [Urine:140 (0 mL/kg/hr)]  Weight: 197.8 kg     Relevant Results  Scheduled medications  acetaminophen, 975 mg, oral, q8h BASILIO  apixaban, 5 mg, oral, q12h  atorvastatin, 40 mg, oral, Daily  lidocaine, 1 Application, urethral, Once  magnesium oxide, 800 mg, oral, Daily  metoprolol tartrate, 25 mg, oral, q6h  polyethylene glycol, 17 g, oral, BID  torsemide, 20 mg, oral, BID with meals      Continuous medications  lactated Ringer's, 100 mL/hr, Last Rate: 100 mL/hr (10/19/23 1153)    PRN medications  PRN medications: oxygen, traMADol    Results for orders placed or performed during the hospital encounter of 10/03/23 (from the past 24 hour(s))   Urine electrolytes   Result Value Ref Range    Sodium, Urine Random 16 mmol/L    Sodium/Creatinine Ratio 4 Not established. mmol/g Creat    Potassium, Urine Random 35 mmol/L    Potassium/Creatinine Ratio 8 Not established mmol/g Creat    Chloride, Urine Random 17 mmol/L    Chloride/Creatinine Ratio 4 (L) 23 - 275 mmol/g creat    Creatinine, Urine Random 429.4 (H) 20.0 - 370.0 mg/dL   Urinalysis with Reflex Microscopic   Result Value Ref Range    Color, Urine Qian (N) Straw, Yellow    Appearance, Urine Hazy (N) Clear    Specific Gravity, Urine 1.023 1.005 - 1.035    pH, Urine 5.0 5.0, 5.5, 6.0, 6.5, 7.0, 7.5, 8.0    Protein, Urine >=500 (3+) (N) NEGATIVE mg/dL    Glucose, Urine NEGATIVE NEGATIVE mg/dL    Blood, Urine LARGE (3+) (A) NEGATIVE    Ketones, Urine NEGATIVE NEGATIVE mg/dL    Bilirubin, Urine NEGATIVE NEGATIVE    Urobilinogen, Urine <2.0 <2.0 mg/dL    Nitrite, Urine NEGATIVE NEGATIVE    Leukocyte Esterase, Urine LARGE (3+) (A) NEGATIVE   Protein, Urine Random   Result Value " Ref Range    Total Protein, Urine Random 292 (H) 5 - 25 mg/dL    Creatinine, Urine Random 429.4 (H) 20.0 - 370.0 mg/dL    T. Protein/Creatinine Ratio 0.68 (H) 0.00 - 0.17 mg/mg Creat   Urinalysis Microscopic Only   Result Value Ref Range    WBC, Urine >50 (A) 1-5, NONE /HPF    RBC, Urine >20 (A) NONE, 1-2, 3-5 /HPF    Squamous Epithelial Cells, Urine 1-9 (SPARSE) Reference range not established. /HPF    Mucus, Urine 1+ Reference range not established. /LPF    Hyaline Casts, Urine 1+ (A) NONE /LPF   POCT GLUCOSE   Result Value Ref Range    POCT Glucose 88 74 - 99 mg/dL   CBC and Auto Differential   Result Value Ref Range    WBC 11.6 (H) 4.4 - 11.3 x10*3/uL    nRBC 0.5 (H) 0.0 - 0.0 /100 WBCs    RBC 5.04 4.50 - 5.90 x10*6/uL    Hemoglobin 11.8 (L) 13.5 - 17.5 g/dL    Hematocrit 37.7 (L) 41.0 - 52.0 %    MCV 75 (L) 80 - 100 fL    MCH 23.4 (L) 26.0 - 34.0 pg    MCHC 31.3 (L) 32.0 - 36.0 g/dL    RDW 17.8 (H) 11.5 - 14.5 %    Platelets 371 150 - 450 x10*3/uL    MPV 11.4 7.5 - 11.5 fL    Neutrophils % 82.3 40.0 - 80.0 %    Immature Granulocytes %, Automated 0.8 0.0 - 0.9 %    Lymphocytes % 5.7 13.0 - 44.0 %    Monocytes % 8.4 2.0 - 10.0 %    Eosinophils % 2.2 0.0 - 6.0 %    Basophils % 0.6 0.0 - 2.0 %    Neutrophils Absolute 9.56 (H) 1.20 - 7.70 x10*3/uL    Immature Granulocytes Absolute, Automated 0.09 0.00 - 0.70 x10*3/uL    Lymphocytes Absolute 0.66 (L) 1.20 - 4.80 x10*3/uL    Monocytes Absolute 0.98 0.10 - 1.00 x10*3/uL    Eosinophils Absolute 0.26 0.00 - 0.70 x10*3/uL    Basophils Absolute 0.07 0.00 - 0.10 x10*3/uL   Magnesium   Result Value Ref Range    Magnesium 2.46 (H) 1.60 - 2.40 mg/dL   Renal Function Panel   Result Value Ref Range    Glucose 70 (L) 74 - 99 mg/dL    Sodium 139 136 - 145 mmol/L    Potassium 4.9 3.5 - 5.3 mmol/L    Chloride 96 (L) 98 - 107 mmol/L    Bicarbonate 23 21 - 32 mmol/L    Anion Gap 25 (H) 10 - 20 mmol/L    Urea Nitrogen 88 (H) 6 - 23 mg/dL    Creatinine 9.88 (H) 0.50 - 1.30 mg/dL    eGFR  5 (L) >60 mL/min/1.73m*2    Calcium 9.0 8.6 - 10.6 mg/dL    Phosphorus 5.6 (H) 2.5 - 4.9 mg/dL    Albumin 2.7 (L) 3.4 - 5.0 g/dL       Assessment/Plan     Mr. Michael Calderón is a 63 year old gentleman w/ PMHx significant for HTN, atrial fibrillation on rate control, remote history (years ago per patient) requiring chronic anticoagulation w/ Eliquis, HLD, HFpEF, and TRACIE who was admitted for suspected heart failure exacerbation. Pt intially treated w/ diuresis w/ worsening of creatinine. Baseline appears to be 1.1-1.3 as an outpatient, and was 2.88 on admission. Has since uptrended despite adequate diuresis. Urine studies w/ FeUrea 4.2% indicative of pre-renal etiology. Pt was given 2L LR on 10/16 w/ no improvement of creatinine. In addition, patient had right heart cath on 10/16 which showed RA pressure 10, RVEDP 14, Wedge 16, PA pressure of 33/5 w/ mean 21, and cardiac index 1.7. Echo on 10/4 showed EF 60-65% w/ moderate pericardial effusion (~stable to slightly increased from 7/18). Did have CT abdomen/pelvis which was showing abdominal lymphadenopathy as well as anasarca, current plan is for PET scan. Given adenopathy seen on CT scan, SPEP pending, UPEP normal. Patient was unable to tolerate PET scan.      Recommendations:   SG, pre-renal etiology  - Urine studies showing FeNa 0.3% consistent w/ pre-renal etiology; total protein 292 mg/dL and protein/creatinine 0.68  - Given this, can trial patient w/ cautious 250mL bolus if patient not fluid overloaded on IVC exam  - If patient responds w/ increased urine output, can stop at one bolus; if no increased UOP, can do one more 250mL bolus (total 500) IF patient not volume overloaded on IVC exam  - Would also re-image kidneys w/ renal ultrasound w/ doppler studies to evaluate venous and arterial flow  - If patient is unresponsive to fluids and doesn't have abnormal vasculature or other reversible cause, will likely need hemodialysis  - Patient would need to be off of  his eliquis prior to HD catheter placement, timing per IR  - If above is unsuccessful in determining etiology, would plan for renal biopsy as well   - Cr up to 9.88 today    - UPEP normal     - Patient did have pericardial effusion on 10/4, but relatively stable from 7/2023 per report, less likely to be uremic pericarditis   - Would evaluate for possible glomerulonephritis by obtaining:              - C3 and C4 complement levels negative              - ANCA profile pending     Above course of action dependent upon goals of care discussion w/ patient and his family.      Patient seen and discussed w/ attending Dr. Olivarez.      Marco Antonio Cordova MD  Internal Medicine PGY-1

## 2023-10-19 NOTE — CONSULTS
"Inpatient consult to Palliative Care  Consult performed by: TOMASZ Byers-CNP  Consult ordered by: Calderon Toussaint MD        Reason For Consult: communication/complex medical decision making, symptom management, and patient/family support    History Of Present Illness  Michael Calderón is a 63 y.o. male with a pmh of HFpEF, Afib, HTN, HLD, morbid obesity, Pulmonary embolisms, and TRACIE who presented with an acute heart failure exacerbation requiring IV diuretics. Course complicated by acute renal failure. Baseline creatinine appears to e 1.1-1.3, as documented in outpatient records. Adenopathy and anasarca seen on CT scan; pt unable to tolerate PET scan. Nephrology following for possible HD and/or renal biopsy if pt does not respond to IV hydration.     Symptoms  Pain: abdominal pain, aching and burning, 6/10, no reported aggravating or alleviating factors.  Dyspnea: present, intermittently worsens with heart palpitations which were severe overnight   Fatigue: significant   Insomnia: difficulty staying asleep   Drowsiness: significant (falling asleep mid conversation)  Constipation: denies, intermittent diarrhea  Nausea: denies   Appetite: poor, no altered taste. Reports feelings of fullness before even beginning to eat   Anxiety: significant with heart palpitations   Depression mild, reports that being hospitalized has caused him to feel \"down\"    Serious illness conversation: spoke with pt and wife Emma at bedside   Information: prefers full disclosure of all health information   Understanding: fair understanding of current health situation   Prognosis: did not discuss at today's visit. Pt and wife did report that they are Christian. Wife states that if there came a time where he would need blood products, she knows that \"god will take care of him\" and that would be \"his time\" as he would not accept blood products  Goals: to prioritize life, no blood products, to go through HD if needed for every chance " "for recovery   Minimal acceptable outcome: deferred  Maximal burden: no blood products. Wife states that they will do all procedures necessary to maintain life, although I did not explicitly discuss coed status during this visit       Advance Care Planning   Surrogate decision maker: wife Emma  Advance directives: not on file although wife states these are completed     Social History  Patient lives at home with his wife. He uses a cane/walker for ambulation. 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      Allergies  Patient has no known allergies.     Physical Exam  Physical Exam  Constitutional:       Appearance: He is obese.   HENT:      Head: Normocephalic.      Mouth/Throat:      Mouth: Mucous membranes are dry.   Cardiovascular:      Rate and Rhythm: Rhythm irregular.   Pulmonary:      Comments: Intermittent tachypnea  Abdominal:      Palpations: Abdomen is soft.   Musculoskeletal:      Comments: Limited rom, weakness   Skin:     General: Skin is dry.   Neurological:      Mental Status: He is oriented to person, place, and time.      Comments: drowsy   Psychiatric:         Mood and Affect: Mood normal.         Behavior: Behavior normal.         Vitals  Vital Signs  Temp: 36.1 °C (97 °F)  Temp Source: Temporal  Heart Rate: 104  Heart Rate Source: Monitor  Resp: 18  BP: 97/61  BP Location: Right arm  BP Method: Automatic  Patient Position: Lying    Height and Weight  Height and Weight  Height: 183 cm (6' 0.05\")  Height Method: Stated  Weight: (!) 198 kg (436 lb 1.1 oz)   Weight Method: Standing scale  BSA (Calculated - sq m): 3.17 sq meters  BMI (Calculated): 59.06  Weight in (lb) to have BMI = 25: 184.2      Relevant Results  [unfilled]    Encounter Date: 10/03/23   ECG 12 lead   Result Value    Ventricular Rate 105    QRS Duration 96    QT Interval 356    QTC Calculation(Bazett) 470    R Axis 183    T Axis 99    QRS Count 17    Q Onset 211    T Offset 389    QTC Fredericia 428    Narrative "    Please see ED Provider Note for formal interpretation  Confirmed by Gennaro Toledo ([id number]) on 10/4/2023 4:28:22 AM        Assessment/Plan     Medical Problems       Problem List       * (Principal) Acute on chronic congestive heart failure, unspecified heart failure type (CMS/HCC)    Acute diastolic heart failure with preserved ejection fraction (CMS/HCC)    Atrial fibrillation (CMS/HCC)  posing threat to life and function     Overview Signed 2/25/2023  6:01 AM by Cara Duarte     Chronic Condition Documentation: Stable based on symptoms and exam. Continue established treatment plan and follow-up at least yearly.         Essential hypertension    Volume overload    Foot pain    Hemoptysis    HLD (hyperlipidemia)    Hydrocele    Chronic pain of both knees    Morbid (severe) obesity due to excess calories (CMS/HCC)    Overview Addendum 10/6/2023  2:38 PM by Wade Bailey MD     Chronic Condition Documentation: Stable based on review of most recent BMI. Patient counseled on nutrition and fitness and follow-up at least yearly. Morbid obesity with body mass index (BMI) of 40.0 or higher         Nerve entrapment syndrome of left heel    Sleep apnea    Primary osteoarthritis of both knees    Pulmonary embolism (CMS/HCC)    Reactive airway disease    Wheezing    Body mass index (BMI) 50.0-59.9, adult (CMS/HCC)    Personal history of other venous thrombosis and embolism    Chronic atrial fibrillation (CMS/HCC)    History of pulmonary embolism    Lower extremity edema    Long term (current) use of anticoagulants    Acute on chronic diastolic (congestive) heart failure (CMS/HCC)    Venous insufficiency (chronic) (peripheral)    Polyp of colon    Overview Signed 10/6/2023  2:38 PM by Wade Bailey MD     Formatting of this note might be different from the original. Added automatically from request for surgery [id number]         Scrotal edema          #complex medical decision making   #goals of care  #advance care  planning   -code status: full code  -surrogate decision maker: wife katie   -patient and wife are agreeable to HD if that is the final recommendation from nephrology     #acute pain -abdomen   -monitor frequency of bowel movements and continue bowel regimen as appropriate to prevent constipation   -tramadol is a poor choice for pain control, recommend discontinuing   -consider dilaudid 1-2mg q4-6 hrs PRN with dilaudid 0.4mg IV q4 PRN for breakthrough pain   -continue acetaminophen scheduled  -consider nonpharmacologic measures such as heat/ice packs, music therapy consult    #insomnia   -consider melatonin 5mg q1800 and q2100 scheduled     #anxiety  #coping   -music therapy consult  -spiritual care support: [employer]  -SW support    We will continue to follow as needed for ongoing GOC and symptom management     I spent 75 minutes in the care of this patient. I spent an additional 1 hour in ACP    Medical Decision Making was high level due to high complexity of problems, extensive data review, and high risk of management/treatment.      Thank you for allowing us to care for this patient. Palliative will continue to follow as needed.   Palliative medicine is available Monday-Friday, 8a-6p. Please contact team with any questions or concerns.  Team pager [phone]  Karla Deras DNP, CNP

## 2023-10-19 NOTE — PROGRESS NOTES
Occupational Therapy                 Therapy Communication Note    Patient Name: Michael Calderón  MRN: [id number]  Today's Date: 10/19/2023     Discipline: Occupational Therapy    Missed Visit Reason: Missed Visit Reason: Patient refused    Missed Time: Attempt    Comment:

## 2023-10-19 NOTE — PROGRESS NOTES
Michael Calderón is a 63 y.o. male on day 15 of admission presenting with Acute on chronic congestive heart failure, unspecified heart failure type (CMS/HCC).    Subjective   Overnight had episode of tachycardia into 150s, no chest pain or increased SOB but was diaphoretic. EKG showed afib (known problem) and heart rate decreased to 110s-120s.     This morning patient laying in bed, reports doing about the same as yesterday. Discussed with patient and his wife that his kidney function continues to worsen and there is not a whole lot we are able to do at this point. Brought up the idea of a goals of care discussion with the palliative team which patient and patient's wife agreed to.     Objective     Physical Exam  Constitutional:       General: He is not in acute distress.     Appearance: He is obese.   HENT:      Head: Normocephalic and atraumatic.      Right Ear: External ear normal.      Left Ear: External ear normal.      Nose: Nose normal.      Mouth/Throat:      Mouth: Mucous membranes are moist.   Eyes:      Extraocular Movements: Extraocular movements intact.      Conjunctiva/sclera: Conjunctivae normal.      Pupils: Pupils are equal, round, and reactive to light.   Cardiovascular:      Rate and Rhythm: Regular rhythm. Tachycardia present.      Pulses: Normal pulses.      Heart sounds: Normal heart sounds.   Pulmonary:      Effort: Pulmonary effort is normal.      Breath sounds: No stridor. No wheezing, rhonchi or rales.      Comments: Slightly diminished breath sounds at bilateral bases  Abdominal:      General: Abdomen is flat. Bowel sounds are normal. There is no distension.      Palpations: Abdomen is soft.      Tenderness: There is no abdominal tenderness. There is no right CVA tenderness, left CVA tenderness or guarding.   Musculoskeletal:         General: Normal range of motion.      Right lower leg: Edema present.      Left lower leg: Edema present.      Comments: 3+ bilateral LE edema with chronic  "venous stasis changes   Skin:     General: Skin is warm.      Capillary Refill: Capillary refill takes less than 2 seconds.   Neurological:      General: No focal deficit present.      Mental Status: He is alert and oriented to person, place, and time.   Psychiatric:         Mood and Affect: Mood normal.         Behavior: Behavior normal.         Last Recorded Vitals  Blood pressure 97/61, pulse 104, temperature 36.1 °C (97 °F), resp. rate 18, height 1.83 m (6' 0.05\"), weight (!) 198 kg (436 lb 1.1 oz), SpO2 91 %.  Intake/Output last 3 Shifts:  I/O last 3 completed shifts:  In: 380 (1.9 mL/kg) [P.O.:380]  Out: 140 (0.7 mL/kg) [Urine:140 (0 mL/kg/hr)]  Weight: 197.8 kg     Assessment/Plan   Principal Problem:    Acute on chronic congestive heart failure, unspecified heart failure type (CMS/HCC)  Active Problems:    Acute diastolic heart failure with preserved ejection fraction (CMS/HCC)    Atrial fibrillation (CMS/HCC)    Essential hypertension    Volume overload    HLD (hyperlipidemia)    Acute on chronic diastolic (congestive) heart failure (CMS/HCC)    This is a 61yo male with a PMH of HTN, Afib, PE, HLD, HFpEF, and TRACIE, admitted recently for heart failure exacerbation, and is being re-admitted for volume overload again and SG likely related to congestion and volume overload. Compliant with Torsemide 40mg BID. BP is soft and has Afib with controlled rhythm on admission. Admitted for optimization of volume status and home HF medications.     UPDATE 10/19  - Cr continues to rise, 9.88 from 7.88 yesterday (baseline ~1)  - Renal recommended eval for glomerulonephritis with C3&C4 (wnl) and ANCA panel (pending). Repeat FeNa suggests still prerenal and urine protein:Cr suggests proteinuria. Follow up with renal regarding plans for today (dialysis? Maybe or maybe not depending on their input and results of GOC discussion)  - Renal also recommended no more diuresis or fluid for now.   - Patient unable to tolerate being " supine for PET scan, exam aborted with no result.   - Follow up UPEP and SPEP  - Follow up renal ultrasound      #Heart failure exacerbation with signs of volume overload  ::hs Troponin 33,    ::received 40mg IV lasix on 10/4  ::significant signs of venous congestion on CXR     Plan  -Bumex pending results of second bladder scan  -Held BP meds for very soft blood pressures including:  -Lisinopril 40mg daily - held   -Amlodipine 5mg daily- held  -Hydralazine 50mg TID- held      #SG, cardiorenal  ::likely due to congestion and volume overload, current Cr 2.88, baseline 1.2  ::UA: negative nitrite, small LE  ::Cr up to 4.37 as of 10/14, either complication of ongoing cardiorenal syndrome or post-obstructive nephropathy due to urinary retention. If repeat bladder scan comes back wnl, can hold diuresis, otherwise medina and continue bumex.  Plan  -Will keep monitoring BID RFP  -Urine lytes and kidney US ordered, unremarkable  -likely cardiorenal, will keep diuresing     #HLD  -lipid profile ordered  -Atorvastatin resumed      #Afib   -Controlled rate on admission   -Resumed DOAC     #PE in the past (15 years ago)  -On DOAC, indefinite therapy     F: replete PRN  E: replete PRN, K>4 Mg>2  N: regular 2g Na  A: Peripheral IV     DVT PPX: DOAC  GI Ppx: not indicated     Code Status: Full Code  NOK: Extended Emergency Contact Information  Primary Emergency Contact: Emma Calderón  Home Phone: [phone]  Relation: Spouse     Teo Larson MD

## 2023-10-19 NOTE — SIGNIFICANT EVENT
10/19/23 1636   Onset Documentation   Rapid Response Initiated By Radar auto page   [place] [facility] [place]   Pager Time [phone]   Arrival Time 1641   Event End Time 1645   Level II Called No   Primary Reason for Call Radar auto page     Rapid Response Note    Radar auto page received for a Radar score of 6 with the following vital signs: 36.4, 111, 24, 99/64, 91%.  Vital signs reviewed with RN who has no concerns at this time.  RN will contact Rapid Response with any concerns or with patient deterioration.

## 2023-10-19 NOTE — SIGNIFICANT EVENT
10/19/23 0756   Onset Documentation   Rapid Response Initiated By Radar auto page   Location/Room [facility] [place]   Pager Time [phone]   Arrival Time 0801   Event End Time 0806   Level II Called No   Primary Reason for Call Radar auto page     Rapid Response Note    Radar auto-page received for a Radar score of 6 with the following vital signs: 36.1, 104, 18, 97/61, 91%.  Vital signs were reviewed with RN who has no concerns at this time.  RN will contact Rapid Response with any future concerns or patient deterioration.

## 2023-10-20 NOTE — PROGRESS NOTES
Physical Therapy                 Therapy Communication Note    Patient Name: Michael Calderón  MRN: [id number]  Today's Date: 10/20/2023     Discipline: Physical Therapy    Missed Visit Reason: Missed Visit Reason: Patient sleeping (Wife in room pt. stirred and said he had a bad night and he is just trying to get some sleep but having difficulty due to people coming into his room.)    Missed Time: Attempt    Comment:

## 2023-10-20 NOTE — PROGRESS NOTES
Occupational Therapy                 Therapy Communication Note    Patient Name: Michael Calderón  MRN: [id number]  Today's Date: 10/20/2023     Discipline: Occupational Therapy    Missed Visit Reason: Missed Visit Reason: Patient sleeping

## 2023-10-20 NOTE — PROGRESS NOTES
Michael Calderón is a 63 y.o. male on day 16 of admission presenting with Acute on chronic congestive heart failure, unspecified heart failure type (CMS/HCC).    Subjective   NAEO. This morning patient reported feeling about the same. Discussed that he might need dialysis which he is amenable to. No new complaints. Patient and wife in room today.     Objective     Physical Exam  Constitutional:       General: He is not in acute distress.     Appearance: He is obese.   HENT:      Head: Normocephalic and atraumatic.      Right Ear: External ear normal.      Left Ear: External ear normal.      Nose: Nose normal.      Mouth/Throat:      Mouth: Mucous membranes are moist.   Eyes:      Extraocular Movements: Extraocular movements intact.      Conjunctiva/sclera: Conjunctivae normal.      Pupils: Pupils are equal, round, and reactive to light.   Cardiovascular:      Rate and Rhythm: Regular rhythm. Tachycardia present.      Pulses: Normal pulses.      Heart sounds: Normal heart sounds.   Pulmonary:      Effort: Pulmonary effort is normal.      Breath sounds: No stridor. No wheezing, rhonchi or rales.      Comments: Slightly diminished breath sounds at bilateral bases  Abdominal:      General: Abdomen is flat. Bowel sounds are normal. There is no distension.      Palpations: Abdomen is soft.      Tenderness: There is no abdominal tenderness. There is no right CVA tenderness, left CVA tenderness or guarding.   Musculoskeletal:         General: Normal range of motion.      Right lower leg: Edema present.      Left lower leg: Edema present.      Comments: 3+ bilateral LE edema with chronic venous stasis changes   Skin:     General: Skin is warm.      Capillary Refill: Capillary refill takes less than 2 seconds.   Neurological:      General: No focal deficit present.      Mental Status: He is alert and oriented to person, place, and time.   Psychiatric:         Mood and Affect: Mood normal.         Behavior: Behavior normal.  "        Last Recorded Vitals  Blood pressure 108/69, pulse (!) 132, temperature 36.6 °C (97.9 °F), resp. rate 20, height 1.83 m (6' 0.05\"), weight (!) 197 kg (435 lb 3 oz), SpO2 96 %.  Intake/Output last 3 Shifts:  I/O last 3 completed shifts:  In: 300 (1.5 mL/kg) [I.V.:300 (1.5 mL/kg)]  Out: 50 (0.3 mL/kg) [Urine:50 (0 mL/kg/hr)]  Weight: 197.4 kg     Assessment/Plan   Principal Problem:    Acute on chronic congestive heart failure, unspecified heart failure type (CMS/HCC)  Active Problems:    Acute diastolic heart failure with preserved ejection fraction (CMS/HCC)    Atrial fibrillation (CMS/HCC)    Essential hypertension    Volume overload    HLD (hyperlipidemia)    Acute on chronic diastolic (congestive) heart failure (CMS/HCC)    This is a 61yo male with a PMH of HTN, Afib, PE, HLD, HFpEF, and TRACIE, admitted recently for heart failure exacerbation, and is being re-admitted for volume overload again and SG likely related to congestion and volume overload. Compliant with Torsemide 40mg BID. BP is soft and has Afib with controlled rhythm on admission. Admitted for optimization of volume status and home HF medications.     UPDATE 10/20  - Cr 9.5 from 9.88 yesterday  - Follow up renal recs regarding dialysis. Eliquis stopped in case patient goes for catheter placement  - Start DVT ppx after dialysis catheter placement  - UPEP normal  - renal ultrasound: unremarkable right kidney, non visualized left kidney, diffuse lymphadenopathy, can evaluate further once patient over acute kidney injury / more stable      #Heart failure exacerbation with signs of volume overload  ::hs Troponin 33,    ::received 40mg IV lasix on 10/4  ::significant signs of venous congestion on CXR     Plan  -Bumex pending results of second bladder scan  -Held BP meds for very soft blood pressures including:  -Lisinopril 40mg daily - held   -Amlodipine 5mg daily- held  -Hydralazine 50mg TID- held      #SG, cardiorenal  ::likely due to " congestion and volume overload, current Cr 2.88, baseline 1.2  ::UA: negative nitrite, small LE  ::Cr up to 4.37 as of 10/14, either complication of ongoing cardiorenal syndrome or post-obstructive nephropathy due to urinary retention. If repeat bladder scan comes back wnl, can hold diuresis, otherwise medina and continue bumex.  Plan  -Will keep monitoring BID RFP  -Urine lytes and kidney US ordered, unremarkable  -likely cardiorenal, will keep diuresing     #HLD  -lipid profile ordered  -Atorvastatin resumed      #Afib   -Controlled rate on admission   -Resumed DOAC     #PE in the past (15 years ago)  -On DOAC, indefinite therapy     F: replete PRN  E: replete PRN, K>4 Mg>2  N: regular 2g Na  A: Peripheral IV     DVT PPX: DOAC  GI Ppx: not indicated     Code Status: Full Code  NOK: Extended Emergency Contact Information  Primary Emergency Contact: Emma Calderón  Home Phone: [phone]  Relation: Spouse     Teo Larson MD

## 2023-10-20 NOTE — PROGRESS NOTES
"Michael Calderón is a 63 y.o. male on day 16 of admission presenting with Acute on chronic congestive heart failure, unspecified heart failure type (CMS/HCC).    Subjective   Pt stable overnight. Increased HR likely 2/2 afib, but patient states he was less symptomatic last night than he had been previously. Feels the same, fatigued. Denies any more SOB than previous, stating that his shortness of breath \"comes and goes.\"       Objective     Physical Exam  Constitutional:       General: He is not in acute distress.     Appearance: Normal appearance. He is not toxic-appearing.   HENT:      Head: Normocephalic and atraumatic.      Mouth/Throat:      Mouth: Mucous membranes are moist.      Pharynx: Oropharynx is clear.   Eyes:      General: No scleral icterus.     Conjunctiva/sclera: Conjunctivae normal.      Pupils: Pupils are equal, round, and reactive to light.   Cardiovascular:      Rate and Rhythm: Tachycardia present. Rhythm irregular.      Pulses: Normal pulses.      Heart sounds: Normal heart sounds. No murmur heard.     No friction rub. No gallop.   Pulmonary:      Effort: Pulmonary effort is normal. No respiratory distress.      Breath sounds: Normal breath sounds. No wheezing, rhonchi or rales.   Abdominal:      General: Bowel sounds are normal. There is distension.      Palpations: Abdomen is soft.      Tenderness: There is abdominal tenderness. There is no guarding.   Musculoskeletal:         General: Swelling (bilateral, L>R, chronic venous skin changes) present. Normal range of motion.      Cervical back: Normal range of motion.   Skin:     General: Skin is warm and dry.      Capillary Refill: Capillary refill takes less than 2 seconds.      Coloration: Skin is not jaundiced.   Neurological:      General: No focal deficit present.      Mental Status: He is alert and oriented to person, place, and time.      Cranial Nerves: No cranial nerve deficit.      Motor: No weakness.   Psychiatric:         Mood and " "Affect: Mood normal.         Thought Content: Thought content normal.         Last Recorded Vitals  Blood pressure 108/69, pulse (!) 132, temperature 36.6 °C (97.9 °F), resp. rate 20, height 1.83 m (6' 0.05\"), weight (!) 197 kg (435 lb 3 oz), SpO2 96 %.  Intake/Output last 3 Shifts:  I/O last 3 completed shifts:  In: 300 (1.5 mL/kg) [I.V.:300 (1.5 mL/kg)]  Out: 50 (0.3 mL/kg) [Urine:50 (0 mL/kg/hr)]  Weight: 197.4 kg     Relevant Results  Scheduled medications  acetaminophen, 975 mg, oral, q8h BASILIO  [Held by provider] apixaban, 5 mg, oral, q12h  atorvastatin, 40 mg, oral, Daily  lidocaine, 1 Application, urethral, Once  magnesium oxide, 800 mg, oral, Daily  metoprolol tartrate, 25 mg, oral, q6h  polyethylene glycol, 17 g, oral, BID  torsemide, 20 mg, oral, BID with meals      Continuous medications     PRN medications  PRN medications: oxygen, traMADol    Results for orders placed or performed during the hospital encounter of 10/03/23 (from the past 24 hour(s))   CBC and Auto Differential   Result Value Ref Range    WBC 11.8 (H) 4.4 - 11.3 x10*3/uL    nRBC 0.8 (H) 0.0 - 0.0 /100 WBCs    RBC 5.01 4.50 - 5.90 x10*6/uL    Hemoglobin 11.7 (L) 13.5 - 17.5 g/dL    Hematocrit 36.9 (L) 41.0 - 52.0 %    MCV 74 (L) 80 - 100 fL    MCH 23.4 (L) 26.0 - 34.0 pg    MCHC 31.7 (L) 32.0 - 36.0 g/dL    RDW 17.5 (H) 11.5 - 14.5 %    Platelets 393 150 - 450 x10*3/uL    MPV 11.4 7.5 - 11.5 fL    Neutrophils % 84.6 40.0 - 80.0 %    Immature Granulocytes %, Automated 0.8 0.0 - 0.9 %    Lymphocytes % 5.2 13.0 - 44.0 %    Monocytes % 7.0 2.0 - 10.0 %    Eosinophils % 1.9 0.0 - 6.0 %    Basophils % 0.5 0.0 - 2.0 %    Neutrophils Absolute 10.00 (H) 1.20 - 7.70 x10*3/uL    Immature Granulocytes Absolute, Automated 0.10 0.00 - 0.70 x10*3/uL    Lymphocytes Absolute 0.62 (L) 1.20 - 4.80 x10*3/uL    Monocytes Absolute 0.83 0.10 - 1.00 x10*3/uL    Eosinophils Absolute 0.22 0.00 - 0.70 x10*3/uL    Basophils Absolute 0.06 0.00 - 0.10 x10*3/uL "   Magnesium   Result Value Ref Range    Magnesium 2.66 (H) 1.60 - 2.40 mg/dL   Renal Function Panel   Result Value Ref Range    Glucose 79 74 - 99 mg/dL    Sodium 138 136 - 145 mmol/L    Potassium 5.0 3.5 - 5.3 mmol/L    Chloride 95 (L) 98 - 107 mmol/L    Bicarbonate 20 (L) 21 - 32 mmol/L    Anion Gap 28 (H) 10 - 20 mmol/L    Urea Nitrogen 93 (HH) 6 - 23 mg/dL    Creatinine 9.50 (H) 0.50 - 1.30 mg/dL    eGFR 6 (L) >60 mL/min/1.73m*2    Calcium 9.1 8.6 - 10.6 mg/dL    Phosphorus 6.0 (H) 2.5 - 4.9 mg/dL    Albumin 2.5 (L) 3.4 - 5.0 g/dL       Assessment/Plan     Mr. Michael Calderón is a 63 year old gentleman w/ PMHx significant for HTN, atrial fibrillation on rate control, remote history (years ago per patient) requiring chronic anticoagulation w/ Eliquis, HLD, HFpEF, and TRACIE who was admitted for suspected heart failure exacerbation. Pt intially treated w/ diuresis w/ worsening of creatinine. Baseline appears to be 1.1-1.3 as an outpatient, and was 2.88 on admission. Has since uptrended despite adequate diuresis. Urine studies w/ FeUrea 4.2% indicative of pre-renal etiology. Pt was given 2L LR on 10/16 w/ no improvement of creatinine. In addition, patient had right heart cath on 10/16 which showed RA pressure 10, RVEDP 14, Wedge 16, PA pressure of 33/5 w/ mean 21, and cardiac index 1.7. Echo on 10/4 showed EF 60-65% w/ moderate pericardial effusion (~stable to slightly increased from 7/18). Did have CT abdomen/pelvis which was showing abdominal lymphadenopathy as well as anasarca, current plan is for PET scan. Given adenopathy seen on CT scan, SPEP pending, UPEP normal. Patient was unable to tolerate PET scan.      Recommendations:   SG, pre-renal etiology  - Urine studies showing FeNa 0.3% consistent w/ pre-renal etiology; total protein 292 mg/dL and protein/creatinine 0.68  - Renal ultrasound showing normal arterial flow on R, unable to visualize L 2/2 habitus and lymphadenopathy  - Patient did have slight down  trend in Cr today, 9.5 from 9.88  - Will continue to monitor patient's creatinine; unclear if it was fluid bolus that was beneficial or if was diuretics  - At this time, fluid management per primary team  - No current need for HD with possible plateau creatinine now reached; however, given persistent elevation, would be prudent to see if patient is able to have HD catheter placed in likely event he needs dialyzed   - Continue to monitor serum creatinine   - Eliquis held, last dose in AM of 10/20  - If above is unsuccessful in determining etiology, would plan for renal biopsy as well     - Patient did have pericardial effusion on 10/4, but relatively stable from 7/2023 per report, less likely to be uremic pericarditis   - Glomerulonephritis workup:              - C3 and C4 complement levels negative              - ANCA profile pending     Above course of action dependent upon goals of care discussion w/ patient and his family. Patient and wife amenable to HD and consent obtained.      Patient seen and discussed w/ attending Dr. Olivarez.      Marco Antonio Cordova MD  Internal Medicine PGY-1

## 2023-10-20 NOTE — CARE PLAN
The patient's goals for the shift include less pain    The clinical goals for the shift include pt will have no c/o sob, and pt will amublate with assistance    Over the shift, the patient did make progress toward the following goals. Recommendations to address these barriers include purposeful rounding, wearing as needed oxygen and using call bell for assistance when having to ambulate to bathroom.

## 2023-10-20 NOTE — SIGNIFICANT EVENT
Rapid Response RN Note    RADAR score 7 due to the following VS: T 36.5 °Celsius;  ; RR 17; BP 87/56; SPO2 93%.     Contacted bedside RN via phone who indicated that she had informed the resident covering the service about the patient's lower BP.  Adding that she had no other concerns at the present moment, that the patient had no c/o pain nor any other new symptoms.

## 2023-10-21 NOTE — PROGRESS NOTES
Michael Calderón is a 63 y.o. male on day 17 of admission presenting with Acute on chronic congestive heart failure, unspecified heart failure type (CMS/HCC).    Subjective   Pt stable overnight. Increased HR likely 2/2 afib. Feels the same, seems more fatigued. Denies any more SOB than previously.       Objective     Physical Exam  Constitutional:       General: He is not in acute distress.     Appearance: Normal appearance. He is not toxic-appearing.   HENT:      Head: Normocephalic and atraumatic.      Mouth/Throat:      Mouth: Mucous membranes are moist.      Pharynx: Oropharynx is clear.   Eyes:      General: No scleral icterus.     Conjunctiva/sclera: Conjunctivae normal.      Pupils: Pupils are equal, round, and reactive to light.   Cardiovascular:      Rate and Rhythm: Tachycardia present. Rhythm irregular.      Pulses: Normal pulses.      Heart sounds: Normal heart sounds. No murmur heard.     No friction rub. No gallop.   Pulmonary:      Effort: Pulmonary effort is normal. No respiratory distress.      Breath sounds: Normal breath sounds. No wheezing, rhonchi or rales.   Abdominal:      General: Bowel sounds are normal. There is distension.      Palpations: Abdomen is soft.      Tenderness: There is abdominal tenderness. There is no guarding.   Musculoskeletal:         General: Swelling (bilateral, L>R, chronic venous skin changes) present. Normal range of motion.      Cervical back: Normal range of motion.   Skin:     General: Skin is warm and dry.      Capillary Refill: Capillary refill takes less than 2 seconds.      Coloration: Skin is not jaundiced.   Neurological:      General: No focal deficit present.      Mental Status: He is alert and oriented to person, place, and time.      Cranial Nerves: No cranial nerve deficit.      Motor: No weakness.   Psychiatric:         Mood and Affect: Mood normal.         Thought Content: Thought content normal.         Last Recorded Vitals  Blood pressure 101/56,  "pulse (!) 121, temperature 36.2 °C (97.2 °F), resp. rate 20, height 1.83 m (6' 0.05\"), weight (!) 201 kg (443 lb 5.5 oz), SpO2 97 %.  Intake/Output last 3 Shifts:  I/O last 3 completed shifts:  In: 340 (1.7 mL/kg) [P.O.:340]  Out: 112 (0.6 mL/kg) [Urine:110 (0 mL/kg/hr); Stool:2]  Weight: 201.1 kg     Relevant Results  Scheduled medications  acetaminophen, 975 mg, oral, q8h BASILIO  atorvastatin, 40 mg, oral, Daily  lidocaine, 1 Application, urethral, Once  magnesium oxide, 800 mg, oral, Daily  metoprolol tartrate, 25 mg, oral, q6h  polyethylene glycol, 17 g, oral, BID  torsemide, 20 mg, oral, BID with meals      Continuous medications     PRN medications  PRN medications: calcium carbonate, oxygen, traMADol    Results for orders placed or performed during the hospital encounter of 10/03/23 (from the past 24 hour(s))   CBC and Auto Differential   Result Value Ref Range    WBC 11.1 4.4 - 11.3 x10*3/uL    nRBC 1.0 (H) 0.0 - 0.0 /100 WBCs    RBC 4.87 4.50 - 5.90 x10*6/uL    Hemoglobin 11.5 (L) 13.5 - 17.5 g/dL    Hematocrit 36.2 (L) 41.0 - 52.0 %    MCV 74 (L) 80 - 100 fL    MCH 23.6 (L) 26.0 - 34.0 pg    MCHC 31.8 (L) 32.0 - 36.0 g/dL    RDW 16.9 (H) 11.5 - 14.5 %    Platelets 381 150 - 450 x10*3/uL    MPV 11.1 7.5 - 11.5 fL    Neutrophils % 82.4 40.0 - 80.0 %    Immature Granulocytes %, Automated 0.7 0.0 - 0.9 %    Lymphocytes % 5.7 13.0 - 44.0 %    Monocytes % 7.1 2.0 - 10.0 %    Eosinophils % 3.5 0.0 - 6.0 %    Basophils % 0.6 0.0 - 2.0 %    Neutrophils Absolute 9.11 (H) 1.20 - 7.70 x10*3/uL    Immature Granulocytes Absolute, Automated 0.08 0.00 - 0.70 x10*3/uL    Lymphocytes Absolute 0.63 (L) 1.20 - 4.80 x10*3/uL    Monocytes Absolute 0.79 0.10 - 1.00 x10*3/uL    Eosinophils Absolute 0.39 0.00 - 0.70 x10*3/uL    Basophils Absolute 0.07 0.00 - 0.10 x10*3/uL   Magnesium   Result Value Ref Range    Magnesium 2.73 (H) 1.60 - 2.40 mg/dL   Renal Function Panel   Result Value Ref Range    Glucose 61 (L) 74 - 99 mg/dL    " Sodium 137 136 - 145 mmol/L    Potassium 5.2 3.5 - 5.3 mmol/L    Chloride 95 (L) 98 - 107 mmol/L    Bicarbonate 23 21 - 32 mmol/L    Anion Gap 24 (H) 10 - 20 mmol/L    Urea Nitrogen 96 (HH) 6 - 23 mg/dL    Creatinine 11.21 (H) 0.50 - 1.30 mg/dL    eGFR 5 (L) >60 mL/min/1.73m*2    Calcium 8.8 8.6 - 10.6 mg/dL    Phosphorus 5.2 (H) 2.5 - 4.9 mg/dL    Albumin 2.5 (L) 3.4 - 5.0 g/dL   POCT GLUCOSE   Result Value Ref Range    POCT Glucose 83 74 - 99 mg/dL       Assessment/Plan     Mr. Michael Calderón is a 63 year old gentleman w/ PMHx significant for HTN, atrial fibrillation on rate control, remote history (years ago per patient) requiring chronic anticoagulation w/ Eliquis, HLD, HFpEF, and TRACIE who was admitted for suspected heart failure exacerbation. Pt intially treated w/ diuresis w/ worsening of creatinine. Baseline appears to be 1.1-1.3 as an outpatient, and was 2.88 on admission. Has since uptrended despite adequate diuresis. Urine studies w/ FeUrea 4.2% indicative of pre-renal etiology. Pt was given 2L LR on 10/16 w/ no improvement of creatinine. In addition, patient had right heart cath on 10/16 which showed RA pressure 10, RVEDP 14, Wedge 16, PA pressure of 33/5 w/ mean 21, and cardiac index 1.7. Echo on 10/4 showed EF 60-65% w/ moderate pericardial effusion (~stable to slightly increased from 7/18). Did have CT abdomen/pelvis which was showing abdominal lymphadenopathy as well as anasarca, current plan is for PET scan. Given adenopathy seen on CT scan, SPEP pending, UPEP normal. Patient was unable to tolerate PET scan.      Recommendations:   SG, pre-renal etiology  - Urine studies showing FeNa 0.3% consistent w/ pre-renal etiology; total protein 292 mg/dL and protein/creatinine 0.68  - Renal ultrasound showing normal arterial flow on R, unable to visualize L 2/2 habitus and lymphadenopathy  - Patient Cr uptrended , 9.5 to 11.21  - Pt currently needs Hd however line placement scheduled for Monday, no current  acute indication for HD however if he decompensates he will need transfer to ICU for dialysis, will reassess tomorrow    - Continue to monitor serum creatinine   - Eliquis held, last dose in AM of 10/20  - If above is unsuccessful in determining etiology, would plan for renal biopsy as well     - Patient did have pericardial effusion on 10/4, but relatively stable from 7/2023 per report, less likely to be uremic pericarditis   - Glomerulonephritis workup:              - C3 and C4 complement levels negative              - ANCA profile pending     Above course of action dependent upon goals of care discussion w/ patient and his family. Patient and wife amenable to HD and consent obtained.      Patient seen and discussed w/ attending Dr. Olivarez.      Allan Kelley MD  Internal Medicine PGY-1

## 2023-10-21 NOTE — CARE PLAN
The patient's goals for the shift include less pain    The clinical goals for the shift include pt will change position q 2 while awake

## 2023-10-21 NOTE — PROGRESS NOTES
Michael Calderón is a 63 y.o. male on day 17 of admission presenting with Acute on chronic congestive heart failure, unspecified heart failure type (CMS/HCC).    Subjective   NAEO. This morning patient reported feeling about the same. Discussed that he might need dialysis which he is amenable to. No new complaints.     Objective     Physical Exam  Constitutional:       General: He is not in acute distress.     Appearance: He is obese.   HENT:      Head: Normocephalic and atraumatic.      Right Ear: External ear normal.      Left Ear: External ear normal.      Nose: Nose normal.      Mouth/Throat:      Mouth: Mucous membranes are moist.   Eyes:      Extraocular Movements: Extraocular movements intact.      Conjunctiva/sclera: Conjunctivae normal.      Pupils: Pupils are equal, round, and reactive to light.   Cardiovascular:      Rate and Rhythm: Regular rhythm. Tachycardia present.      Pulses: Normal pulses.      Heart sounds: Normal heart sounds.   Pulmonary:      Effort: Pulmonary effort is normal.      Breath sounds: No stridor. No wheezing, rhonchi or rales.      Comments: Slightly diminished breath sounds at bilateral bases  Abdominal:      General: Abdomen is flat. Bowel sounds are normal. There is no distension.      Palpations: Abdomen is soft.      Tenderness: There is no abdominal tenderness. There is no right CVA tenderness, left CVA tenderness or guarding.   Musculoskeletal:         General: Normal range of motion.      Right lower leg: Edema present.      Left lower leg: Edema present.      Comments: 3+ bilateral LE edema with chronic venous stasis changes   Skin:     General: Skin is warm.      Capillary Refill: Capillary refill takes less than 2 seconds.   Neurological:      General: No focal deficit present.      Mental Status: He is alert and oriented to person, place, and time.   Psychiatric:         Mood and Affect: Mood normal.         Behavior: Behavior normal.         Last Recorded Vitals  Blood  "pressure 102/68, pulse 103, temperature 35.6 °C (96.1 °F), resp. rate 22, height 1.83 m (6' 0.05\"), weight (!) 201 kg (443 lb 5.5 oz), SpO2 95 %.  Intake/Output last 3 Shifts:  I/O last 3 completed shifts:  In: 340 (1.7 mL/kg) [P.O.:340]  Out: 112 (0.6 mL/kg) [Urine:110 (0 mL/kg/hr); Stool:2]  Weight: 201.1 kg     Assessment/Plan   Principal Problem:    Acute on chronic congestive heart failure, unspecified heart failure type (CMS/HCC)  Active Problems:    Acute diastolic heart failure with preserved ejection fraction (CMS/HCC)    Atrial fibrillation (CMS/HCC)    Essential hypertension    Volume overload    HLD (hyperlipidemia)    Acute on chronic diastolic (congestive) heart failure (CMS/HCC)    This is a 61yo male with a PMH of HTN, Afib, PE, HLD, HFpEF, and TRACIE, admitted recently for heart failure exacerbation, and is being re-admitted for volume overload again and SG likely related to congestion and volume overload. Compliant with Torsemide 40mg BID. BP is soft and has Afib with controlled rhythm on admission. Admitted for optimization of volume status and home HF medications.     UPDATE 10/21  - Cr uptrending  - Per Renal recs: dialysis once dialysis central line is in place. Eliquis stopped in case patient goes for catheter placement  - Plan to pursue PET +/- biopsy after dialysis initiation    #Heart failure exacerbation with signs of volume overload  ::hs Troponin 33,    ::received 40mg IV lasix on 10/4  ::significant signs of venous congestion on CXR     Plan  -Bumex pending results of second bladder scan  -Held BP meds for very soft blood pressures including:  -Lisinopril 40mg daily - held   -Amlodipine 5mg daily- held  -Hydralazine 50mg TID- held      #SG, cardiorenal  ::likely due to congestion and volume overload, current Cr 2.88, baseline 1.2  ::UA: negative nitrite, small LE  ::Cr up to 4.37 as of 10/14, either complication of ongoing cardiorenal syndrome or post-obstructive nephropathy due " to urinary retention. If repeat bladder scan comes back wnl, can hold diuresis, otherwise medina and continue bumex.  Plan  -Will keep monitoring BID RFP  -Urine lytes and kidney US ordered, unremarkable  -likely cardiorenal, will keep diuresing     #HLD  -lipid profile ordered  -Atorvastatin resumed      #Afib   -Controlled rate on admission   -Resumed DOAC     #PE in the past (15 years ago)  -On DOAC, indefinite therapy     F: replete PRN  E: replete PRN, K>4 Mg>2  N: regular 2g Na  A: Peripheral IV     DVT PPX: DOAC  GI Ppx: not indicated     Code Status: Full Code  NOK: Extended Emergency Contact Information  Primary Emergency Contact: Emma Calderón  Home Phone: [phone]  Relation: Spouse

## 2023-10-21 NOTE — SIGNIFICANT EVENT
RADAR initiated Rapid Response page     10/21/23 1511   Onset Documentation   Rapid Response Initiated By Radar auto page   [place] [facility] [place] Time 1511   Arrival Time 1640   Event End Time 1755   Primary Reason for Call Radar auto page     RADAR of 6 for VS of 35.6, 103, 22, 102/68, 95%.  Upon arrival of RRT RN, patient up out of bed.  Patient appeared pale and diaphoretic.  RN and Nursing staff assisted patient in to bed.  Patient verbalizing abdominal pain radiating to back.  Endorses multiple BMs today.  PIV x 2 placed.  DACR Dr. Karrie Parish to bedside with [facility] team.  Labs ordered and drawn.  EKG reviewed.  Patient remains on division at present time.  RN encouraged to page additional Rapid Response if further concerns in patient condition arise.

## 2023-10-22 NOTE — DISCHARGE SUMMARY
Discharge Diagnosis  Acute on chronic congestive heart failure, unspecified heart failure type (CMS/[facility])    Issues Requiring Follow-Up  None    Test Results Pending At Discharge  Pending Labs       Order Current Status    Blood Gas Lactic Acid, Venous In process    Blood Gas Lactic Acid, Venous In process    Comprehensive metabolic panel In process    Extra Tubes In process    Lipid panel In process    Renal Function Panel In process    SST TOP In process            Hospital Course  Michael Calderón is a 61yo male with a PMH of HTN, Afib, PE, HLD, HFpEF, and TRACIE, admitted recently for heart failure exacerbation, and is being re-admitted for volume overload again and SG likely related to congestion and volume overload. Compliant with Torsemide 40mg BID. BP is soft and has Afib with controlled rhythm on admission. Admitted for optimization of volume status and home HF medications. Patient was started on a bumex drip with additional bumex and metolazone boluses PRN as kidney function and hemodynamic stability allowed.     Patient experienced a rapid response on 10/11 with initial Bps 68/34, quickly improved to 100s/80s. Likely vasovagal response in the setting of intravascular depletion from aggressive diuresis. Diuretic holiday was granted and diuresis resumed after one day. Shortly after diuresis was resumed, patient developed worsening renal function and retention was suspected as medina was recently discontinued. Medina was replaced and additional diuresis holidays granted. Renal function continued to worsen and renal was consulted and saw patient. Palliative also saw patient in the setting of his worsening SG and high suspicious lymph node burden detected on abdominal imaging. No indication for dialysis arose as patient maintained reasonable electrolyte levels and was not volume overloaded but kidney function did not improve. On 10/22/23 at 07:13AM patient  after 8 rounds of CPR for PEA/asystolic cardiac  arrest.     Pertinent Physical Exam At Time of Discharge  Physical Exam 10/22/23 at 07:13AM.  Patient not responsive to stimuli.  Pupils fixed and dilated.  Absent corneal and gag reflexes.  Absent cardiopulmonary sounds, auscultated for >60 seconds.  Absent pulses, palpated for >60 seconds.    Home Medications     Medication List      ASK your doctor about these medications     amLODIPine 5 mg tablet; Commonly known as: Norvasc; TAKE 1 TABLET BY   MOUTH ONCE DAILY; Ask about: Which instructions should I use?   * Eliquis 5 mg tablet; Generic drug: apixaban; TAKE 1 TABLET BY MOUTH   TWO TIMES A DAY   * apixaban 5 mg tablet; Commonly known as: Eliquis; Take 1 tablet (5 mg)   by mouth 2 times a day.   atenolol 100 mg tablet; Commonly known as: Tenormin; Ask about: Which   instructions should I use?   atorvastatin 40 mg tablet; Commonly known as: Lipitor; Ask about: Which   instructions should I use?   Certavite-Antioxidant tablet; Generic drug: multivitamin with minerals;   TAKE 1 TABLET BY MOUTH ONCE DAILY   hydrALAZINE 50 mg tablet; Commonly known as: Apresoline; TAKE 1 TABLET   BY MOUTH EVERY 8 HOURS; Ask about: Which instructions should I use?   lisinopril 40 mg tablet; Take 1 tablet (40 mg) by mouth once daily.; Ask   about: Which instructions should I use?   Nyamyc 100,000 unit/gram powder; Generic drug: nystatin; APPLY TO   AFFECTED AREA(S) THREE TIMES A DAY   potassium chloride CR 20 mEq ER tablet; Commonly known as: Klor-Con M20;   TAKE 2 TABLETS BY MOUTH ONCE DAILY   SmoothLax 17 gram/dose powder; Generic drug: polyethylene glycol; MIX   ONE CAPFUL (17 GRAMS) IN 8 OUNCES OF LIQUID AND DRINK BY MOUTH ONCE DAILY   AS NEEDED FOR CONSTIPATION   Stimulant Laxative Plus 8.6-50 mg tablet; Generic drug:   sennosides-docusate sodium; TAKE 2 TABLETS BY MOUTH TWO TIMES A DAY   * torsemide 20 mg tablet; Commonly known as: Demadex; TAKE 2 TABLETS BY   MOUTH TWO TIMES A DAY   * torsemide 40 mg tablet; Take 40 mg by mouth 2  times a day.  * This list has 4 medication(s) that are the same as other medications   prescribed for you. Read the directions carefully, and ask your doctor or   other care provider to review them with you.       Outpatient Follow-Up  No future appointments.    Teo Larson MD

## 2023-10-22 NOTE — SIGNIFICANT EVENT
Rapid response called for patient who became lethargic while sitting on toilet after ambulating with assistance. Shortly after rapid response team arrived, pt deteriorated and lost a pulse.  The pt was transferred to the floor to begin CPR and to obtain IV access.  Code blue was called. Please see code flow sheet.  Pt was asystole and code was called when the patient's spouse changed status to DNR.

## 2023-10-22 NOTE — SIGNIFICANT EVENT
Time of death: 0713 10/22/23    Pt not responsive to stimuli.  Pupils fixed and dilated.  Absent corneal and gag reflexes.  Absent cardiopulmonary sounds, auscultated for >60 seconds.  Absent pulses, palpated for >60 seconds.    Family made aware

## 2023-10-22 NOTE — CODE DOCUMENTATION
Code Blue 10/22/23  Total Time: 17min 30sec  Time of Death: 0713     Initial Pulse: PEA   8 Rounds Compressions   3 PEA -> 5 asystole     Medications administered   5x epinephrine (total 5mg)   3 amps bicarbonate   1 amp D50   1gm Calcium chloride     Details:   Patient attempting to get up from toilet with difficult despite assistance, became dizzy and began having agonal breathing. Code blue called and upon arrival he was being assisted to ground, had pulse but agonal breathing and unresponsive. Began bagging and no pulse found CPR started.     Total code time 17 minutes 30 seconds with initial pulse PEA and ultimately asystole. ET tube placed by anesthesia.     During code patient's wife Emma Calderón was called, at that time, Emma requested that we continue chest compressions and she was going to drive to the hospital immediately. After about 15 minutes of CPR, called patient's wife Emma Calderón again and discussed that we were still doing CPR on Michael for about 15 minutes. At that point in time, Emma said to let him go and stop doing chest compressions. Confirmed with Emma that she understood that if we stopped CPR that the patient would pass away, and she voiced her understanding and said to stop CPR. At that time, announced that patient's wife asked us to stop CPR and patient's time of death was 7:13am.

## 2023-10-23 ENCOUNTER — LAB REQUISITION (OUTPATIENT)
Dept: LAB | Facility: HOSPITAL | Age: 63
End: 2023-10-23

## 2023-10-23 NOTE — DOCUMENTATION CLARIFICATION NOTE
PATIENT:               DORENE IVERSON  ACCT #:                  [id number]  MRN:                       [id number]  :                       1960  ADMIT DATE:       10/3/2023 6:37 PM  DISCH DATE:        10/22/2023 2:28 PM  RESPONDING PROVIDER #:        [id number]          PROVIDER RESPONSE TEXT:    Acute kidney injury with acute tubular necrosis    CDI QUERY TEXT:    [facility]ATN    Instruction:    Based on your assessment of the patient and the clinical information, please provide the requested documentation by clicking on the appropriate radio button and enter any additional information if prompted.    Question: Please further clarify the diagnosis of acute kidney injury as    When answering this query, please exercise your independent professional judgment. The fact that a question is being asked, does not imply that any particular answer is desired or expected.    The patient's clinical indicators include:  Clinical Information:  63yo male with a PMH of HTN, Afib, PE, HLD, HFpEF, and TRACIE, admitted recently for heart failure exacerbation, and is being re-admitted for volume overload again and SG likely related to congestion and volume overload. Compliant with Torsemide 40mg BID. BP is soft and has Afib with controlled rhythm on admission. Admitted for optimization of volume status and home HF medications.    Clinical Indicators:  10/15/23 Cardiology Note by Dr. Bailey and Dr. Pabon:  SG, cardiorenal  likely due to congestion and volume overload, current Cr 2.88, baseline 1.2    SCr 10/3-18 2.88, 3.10, 3.17, 3.13, 3.68, 3.34, 3.29, 3.69, 3.43, 3.74, 3.91, 4.37, 5.46, 6.30, 7.00, 7.88      10/11 Rapid Response note: pt. with new hypotension 68/34  to 80s/60s systolic low 100s EKG with AFib with RVR     10/15 urine result: WBC 6-10, bilirubin Neg., Moderate blood in urine    10/18 T Protein/Cr ratio 0.68 Sodium urine random 16,    Treatment:  labs  diuresis Bumex  Treatment directed at Heart  Failure exacerbation  10/4 Renal US:  Impression:  1. No hydronephrosis or acute abnormality of the kidneys.  2. Incidentally noted hypoechoic peripancreatic and retroperitoneal  soft tissue lesions likely represent lymphadenopathy which were seen  on the prior CT dated 08/21/2021. Differential diagnosis includes  lymphoma, leukemia, immunosuppression, autoimmune disease, or severe  lymphatic dysfunction.  Nephrology Consult    Risk Factors: hypotension  Options provided:  -- Acute kidney injury with acute tubular necrosis  -- Acute kidney injury without acute tubular necrosis  -- Other - I will add my own diagnosis  -- Refer to Clinical Documentation Reviewer    Query created by: Natalie Stanton on 10/17/2023 9:40 AM      Electronically signed by:  JOE GRIFFITHS MD 10/23/2023 2:03 PM

## 2023-10-24 ENCOUNTER — HOSPITAL ENCOUNTER (OUTPATIENT)
Dept: CARDIOLOGY | Facility: HOSPITAL | Age: 63
Discharge: HOME | End: 2023-10-24
Payer: MEDICARE

## 2023-10-24 LAB
ATRIAL RATE: 300 BPM
Q ONSET: 211 MS
QRS COUNT: 21 BEATS
QRS DURATION: 94 MS
QT INTERVAL: 304 MS
QTC CALCULATION(BAZETT): 453 MS
QTC FREDERICIA: 397 MS
R AXIS: 139 DEGREES
T AXIS: 162 DEGREES
T OFFSET: 363 MS
VENTRICULAR RATE: 134 BPM

## 2023-10-26 ENCOUNTER — HOSPITAL ENCOUNTER (OUTPATIENT)
Dept: CARDIOLOGY | Facility: HOSPITAL | Age: 63
Discharge: HOME | End: 2023-10-26
Payer: MEDICARE

## 2023-10-26 LAB
ATRIAL RATE: 111 BPM
Q ONSET: 210 MS
QRS COUNT: 18 BEATS
QRS DURATION: 102 MS
QT INTERVAL: 380 MS
QTC CALCULATION(BAZETT): 514 MS
QTC FREDERICIA: 465 MS
R AXIS: 140 DEGREES
T AXIS: 92 DEGREES
T OFFSET: 400 MS
VENTRICULAR RATE: 110 BPM

## 2023-10-26 PROCEDURE — 93005 ELECTROCARDIOGRAM TRACING: CPT

## 2023-10-27 ENCOUNTER — HOSPITAL ENCOUNTER (OUTPATIENT)
Dept: CARDIOLOGY | Facility: HOSPITAL | Age: 63
Discharge: HOME | End: 2023-10-27
Payer: MEDICARE

## 2023-10-27 LAB
ATRIAL RATE: 129 BPM
ATRIAL RATE: 94 BPM
Q ONSET: 210 MS
Q ONSET: 213 MS
QRS COUNT: 19 BEATS
QRS COUNT: 19 BEATS
QRS DURATION: 96 MS
QRS DURATION: 98 MS
QT INTERVAL: 332 MS
QT INTERVAL: 332 MS
QTC CALCULATION(BAZETT): 459 MS
QTC CALCULATION(BAZETT): 467 MS
QTC FREDERICIA: 412 MS
QTC FREDERICIA: 417 MS
R AXIS: 118 DEGREES
R AXIS: 129 DEGREES
T AXIS: 124 DEGREES
T AXIS: 5 DEGREES
T OFFSET: 376 MS
T OFFSET: 379 MS
VENTRICULAR RATE: 115 BPM
VENTRICULAR RATE: 119 BPM

## 2023-10-27 PROCEDURE — 93005 ELECTROCARDIOGRAM TRACING: CPT

## 2023-12-01 ENCOUNTER — POST MORTEM DOCUMENTATION (OUTPATIENT)
Dept: PRIMARY CARE | Facility: CLINIC | Age: 63
End: 2023-12-01
Payer: MEDICARE

## 2023-12-01 NOTE — PROGRESS NOTES
I received notification of and acknowledge Mr. Calderón's death while attending to Dr. Lake's remaining tasks,  since he has left the system.

## 2023-12-07 LAB
AUTOPSY REPORT: NORMAL
LAB AP POST MORTEM HOURS: 25.53
LABORATORY COMMENT REPORT: NORMAL
PATH REPORT.RELEVANT HX SPEC: NORMAL
PATH REPORT.TOTAL CANCER: NORMAL
RESIDENT REVIEW: NORMAL

## (undated) DEVICE — CATHETER, THERMODILUTION, SWAN GANZ, HI-SHORE, COATED, W/GUIDEWIRE, 7 FR, 110 CM

## (undated) DEVICE — PAD, DEFIB, CADENCE, ZOLL, ADULT, 10/CS

## (undated) DEVICE — SHEATH, PINNACLE, 10 CM,  7FR INTRODUCER, 7FR DIA, 2.5 CM DIALATOR

## (undated) DEVICE — ACCESS KIT, S-MAK MINI, 4FR 10CM 0.018IN 40CM, NT/PT, ECHO ENHANCE NEEDLE

## (undated) DEVICE — GUIDEWIRE, AMPLATZ SUPER STIFF, STR, .035 IN X 75CM